# Patient Record
Sex: FEMALE | Race: WHITE
[De-identification: names, ages, dates, MRNs, and addresses within clinical notes are randomized per-mention and may not be internally consistent; named-entity substitution may affect disease eponyms.]

---

## 2020-07-22 PROBLEM — Z00.00 ENCOUNTER FOR PREVENTIVE HEALTH EXAMINATION: Status: ACTIVE | Noted: 2020-07-22

## 2020-07-29 ENCOUNTER — APPOINTMENT (OUTPATIENT)
Dept: OTOLARYNGOLOGY | Facility: CLINIC | Age: 75
End: 2020-07-29
Payer: MEDICARE

## 2020-07-29 VITALS
TEMPERATURE: 98.2 F | HEIGHT: 64 IN | BODY MASS INDEX: 17.93 KG/M2 | SYSTOLIC BLOOD PRESSURE: 127 MMHG | HEART RATE: 58 BPM | WEIGHT: 105 LBS | DIASTOLIC BLOOD PRESSURE: 69 MMHG

## 2020-07-29 DIAGNOSIS — H90.3 SENSORINEURAL HEARING LOSS, BILATERAL: ICD-10-CM

## 2020-07-29 PROCEDURE — 99204 OFFICE O/P NEW MOD 45 MIN: CPT | Mod: 25

## 2020-07-29 PROCEDURE — 92567 TYMPANOMETRY: CPT

## 2020-07-29 PROCEDURE — 92557 COMPREHENSIVE HEARING TEST: CPT

## 2020-07-29 RX ORDER — COLD-HOT PACK
EACH MISCELLANEOUS
Refills: 0 | Status: ACTIVE | COMMUNITY

## 2020-07-29 RX ORDER — ASPIRIN 81 MG
81 TABLET ORAL
Refills: 0 | Status: ACTIVE | COMMUNITY

## 2020-07-29 RX ORDER — CALCIUM CARBONATE/VITAMIN D3 600 MG-20
TABLET ORAL
Refills: 0 | Status: ACTIVE | COMMUNITY

## 2020-07-29 NOTE — CONSULT LETTER
[Dear  ___] : Dear  [unfilled], [Consult Letter:] : I had the pleasure of evaluating your patient, [unfilled]. [Please see my note below.] : Please see my note below. [Consult Closing:] : Thank you very much for allowing me to participate in the care of this patient.  If you have any questions, please do not hesitate to contact me. [Sincerely,] : Sincerely, [FreeTextEntry3] : Breann Moreno MD\par Otolaryngology and Cranial Base Surgery\par Attending Physician - Department of Otolaryngology and Head & Neck Surgery\par [facility]\par  - [employer] [facility]\par Office: [phone]\par Fax: [phone]\par

## 2020-07-29 NOTE — ASSESSMENT
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

## 2020-07-29 NOTE — DATA REVIEWED
[de-identified] : audio 7/29/20 with mild downsloping to severe/profound SNHL, type a tymps, good discrim

## 2020-07-29 NOTE — HISTORY OF PRESENT ILLNESS
[de-identified] : 73 y/o F, feels her hearing has been decreasing over the past few years.  No tinnitus, pain, d/c, or Vertigo.

## 2023-11-18 ENCOUNTER — INPATIENT (INPATIENT)
Facility: HOSPITAL | Age: 78
LOS: 3 days | Discharge: HOME HEALTH | DRG: 482 | End: 2023-11-22
Attending: GENERAL PRACTICE | Admitting: GENERAL PRACTICE
Payer: MEDICARE

## 2023-11-18 VITALS
DIASTOLIC BLOOD PRESSURE: 75 MMHG | OXYGEN SATURATION: 97 % | RESPIRATION RATE: 16 BRPM | HEART RATE: 88 BPM | WEIGHT: 104.94 LBS | SYSTOLIC BLOOD PRESSURE: 136 MMHG | HEIGHT: 62 IN

## 2023-11-18 DIAGNOSIS — Z29.9 ENCOUNTER FOR PROPHYLACTIC MEASURES, UNSPECIFIED: ICD-10-CM

## 2023-11-18 DIAGNOSIS — S72.002A FRACTURE OF UNSPECIFIED PART OF NECK OF LEFT FEMUR, INITIAL ENCOUNTER FOR CLOSED FRACTURE: ICD-10-CM

## 2023-11-18 DIAGNOSIS — Z86.79 PERSONAL HISTORY OF OTHER DISEASES OF THE CIRCULATORY SYSTEM: ICD-10-CM

## 2023-11-18 DIAGNOSIS — I10 ESSENTIAL (PRIMARY) HYPERTENSION: ICD-10-CM
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
## 2023-11-18 LAB
ALBUMIN SERPL ELPH-MCNC: 3.9 G/DL — SIGNIFICANT CHANGE UP (ref 3.5–5)
ALP SERPL-CCNC: 88 U/L — SIGNIFICANT CHANGE UP (ref 40–120)
ALT FLD-CCNC: 37 U/L DA — SIGNIFICANT CHANGE UP (ref 10–60)
ANION GAP SERPL CALC-SCNC: 4 MMOL/L — LOW (ref 5–17)
APTT BLD: 27.5 SEC — SIGNIFICANT CHANGE UP (ref 24.5–35.6)
AST SERPL-CCNC: 26 U/L — SIGNIFICANT CHANGE UP (ref 10–40)
BASOPHILS # BLD AUTO: 0.03 K/UL — SIGNIFICANT CHANGE UP (ref 0–0.2)
BASOPHILS NFR BLD AUTO: 0.4 % — SIGNIFICANT CHANGE UP (ref 0–2)
BILIRUB SERPL-MCNC: 0.4 MG/DL — SIGNIFICANT CHANGE UP (ref 0.2–1.2)
BLD GP AB SCN SERPL QL: SIGNIFICANT CHANGE UP
BUN SERPL-MCNC: 12 MG/DL — SIGNIFICANT CHANGE UP (ref 7–18)
CALCIUM SERPL-MCNC: 8.8 MG/DL — SIGNIFICANT CHANGE UP (ref 8.4–10.5)
CHLORIDE SERPL-SCNC: 105 MMOL/L — SIGNIFICANT CHANGE UP (ref 96–108)
CO2 SERPL-SCNC: 29 MMOL/L — SIGNIFICANT CHANGE UP (ref 22–31)
CREAT SERPL-MCNC: 0.78 MG/DL — SIGNIFICANT CHANGE UP (ref 0.5–1.3)
EGFR: 79 ML/MIN/1.73M2 — SIGNIFICANT CHANGE UP
EOSINOPHIL # BLD AUTO: 0.02 K/UL — SIGNIFICANT CHANGE UP (ref 0–0.5)
EOSINOPHIL NFR BLD AUTO: 0.3 % — SIGNIFICANT CHANGE UP (ref 0–6)
GLUCOSE SERPL-MCNC: 110 MG/DL — HIGH (ref 70–99)
HCT VFR BLD CALC: 41.1 % — SIGNIFICANT CHANGE UP (ref 34.5–45)
HGB BLD-MCNC: 13.8 G/DL — SIGNIFICANT CHANGE UP (ref 11.5–15.5)
IMM GRANULOCYTES NFR BLD AUTO: 0.3 % — SIGNIFICANT CHANGE UP (ref 0–0.9)
INR BLD: 0.96 RATIO — SIGNIFICANT CHANGE UP (ref 0.85–1.18)
LYMPHOCYTES # BLD AUTO: 1.17 K/UL — SIGNIFICANT CHANGE UP (ref 1–3.3)
LYMPHOCYTES # BLD AUTO: 16.4 % — SIGNIFICANT CHANGE UP (ref 13–44)
MCHC RBC-ENTMCNC: 32.5 PG — SIGNIFICANT CHANGE UP (ref 27–34)
MCHC RBC-ENTMCNC: 33.6 GM/DL — SIGNIFICANT CHANGE UP (ref 32–36)
MCV RBC AUTO: 96.9 FL — SIGNIFICANT CHANGE UP (ref 80–100)
MONOCYTES # BLD AUTO: 0.3 K/UL — SIGNIFICANT CHANGE UP (ref 0–0.9)
MONOCYTES NFR BLD AUTO: 4.2 % — SIGNIFICANT CHANGE UP (ref 2–14)
NEUTROPHILS # BLD AUTO: 5.61 K/UL — SIGNIFICANT CHANGE UP (ref 1.8–7.4)
NEUTROPHILS NFR BLD AUTO: 78.4 % — HIGH (ref 43–77)
NRBC # BLD: 0 /100 WBCS — SIGNIFICANT CHANGE UP (ref 0–0)
PLATELET # BLD AUTO: 218 K/UL — SIGNIFICANT CHANGE UP (ref 150–400)
POTASSIUM SERPL-MCNC: 4 MMOL/L — SIGNIFICANT CHANGE UP (ref 3.5–5.3)
POTASSIUM SERPL-SCNC: 4 MMOL/L — SIGNIFICANT CHANGE UP (ref 3.5–5.3)
PROT SERPL-MCNC: 6.5 G/DL — SIGNIFICANT CHANGE UP (ref 6–8.3)
PROTHROM AB SERPL-ACNC: 11 SEC — SIGNIFICANT CHANGE UP (ref 9.5–13)
RBC # BLD: 4.24 M/UL — SIGNIFICANT CHANGE UP (ref 3.8–5.2)
RBC # FLD: 12.8 % — SIGNIFICANT CHANGE UP (ref 10.3–14.5)
SODIUM SERPL-SCNC: 138 MMOL/L — SIGNIFICANT CHANGE UP (ref 135–145)
WBC # BLD: 7.15 K/UL — SIGNIFICANT CHANGE UP (ref 3.8–10.5)
WBC # FLD AUTO: 7.15 K/UL — SIGNIFICANT CHANGE UP (ref 3.8–10.5)

## 2023-11-18 PROCEDURE — 73552 X-RAY EXAM OF FEMUR 2/>: CPT | Mod: 26,LT

## 2023-11-18 PROCEDURE — 73502 X-RAY EXAM HIP UNI 2-3 VIEWS: CPT | Mod: 26,LT

## 2023-11-18 PROCEDURE — 99285 EMERGENCY DEPT VISIT HI MDM: CPT

## 2023-11-18 PROCEDURE — 71250 CT THORAX DX C-: CPT | Mod: 26,MA

## 2023-11-18 RX ORDER — IBUPROFEN 200 MG
600 TABLET ORAL ONCE
Refills: 0 | Status: DISCONTINUED | OUTPATIENT
Start: 2023-11-18 | End: 2023-11-18

## 2023-11-18 RX ORDER — OXYCODONE HYDROCHLORIDE 5 MG/1
5 TABLET ORAL EVERY 6 HOURS
Refills: 0 | Status: DISCONTINUED | OUTPATIENT
Start: 2023-11-18 | End: 2023-11-20

## 2023-11-18 RX ORDER — ACETAMINOPHEN 500 MG
1000 TABLET ORAL EVERY 6 HOURS
Refills: 0 | Status: DISCONTINUED | OUTPATIENT
Start: 2023-11-18 | End: 2023-11-18

## 2023-11-18 RX ORDER — HEPARIN SODIUM 5000 [USP'U]/ML
5000 INJECTION INTRAVENOUS; SUBCUTANEOUS EVERY 8 HOURS
Refills: 0 | Status: COMPLETED | OUTPATIENT
Start: 2023-11-18 | End: 2023-11-19

## 2023-11-18 RX ORDER — OXYCODONE HYDROCHLORIDE 5 MG/1
2.5 TABLET ORAL EVERY 6 HOURS
Refills: 0 | Status: DISCONTINUED | OUTPATIENT
Start: 2023-11-18 | End: 2023-11-20

## 2023-11-18 RX ORDER — LANOLIN ALCOHOL/MO/W.PET/CERES
3 CREAM (GRAM) TOPICAL AT BEDTIME
Refills: 0 | Status: DISCONTINUED | OUTPATIENT
Start: 2023-11-18 | End: 2023-11-22

## 2023-11-18 RX ORDER — ONDANSETRON 8 MG/1
4 TABLET, FILM COATED ORAL EVERY 8 HOURS
Refills: 0 | Status: DISCONTINUED | OUTPATIENT
Start: 2023-11-18 | End: 2023-11-22

## 2023-11-18 RX ORDER — KETOROLAC TROMETHAMINE 30 MG/ML
15 SYRINGE (ML) INJECTION EVERY 6 HOURS
Refills: 0 | Status: DISCONTINUED | OUTPATIENT
Start: 2023-11-18 | End: 2023-11-18

## 2023-11-18 RX ORDER — ACETAMINOPHEN 500 MG
650 TABLET ORAL EVERY 6 HOURS
Refills: 0 | Status: DISCONTINUED | OUTPATIENT
Start: 2023-11-18 | End: 2023-11-20

## 2023-11-18 RX ADMIN — OXYCODONE HYDROCHLORIDE 2.5 MILLIGRAM(S): 5 TABLET ORAL at 20:22

## 2023-11-18 RX ADMIN — Medication 1000 MILLIGRAM(S): at 12:24

## 2023-11-18 RX ADMIN — Medication 650 MILLIGRAM(S): at 17:39

## 2023-11-18 RX ADMIN — Medication 15 MILLIGRAM(S): at 12:25

## 2023-11-18 RX ADMIN — OXYCODONE HYDROCHLORIDE 2.5 MILLIGRAM(S): 5 TABLET ORAL at 20:02

## 2023-11-18 NOTE — H&P ADULT - PROBLEM SELECTOR PLAN 1
P/w left hip and left chest pain. S/p fall.   Xray pelvis - left femoral neck fracture.   CT chest - No evidence of acute thoracic traumatic injury on this noncontrast CT chest.  Tylenol - mild pain.   Oxycodone 2.5 - moderate pain  Oxycodone 5.0 - severe pain.   Ortho consulted -  Left hip hemiarthroplasty on Monday, 11/20/2023.   PT consult after surgery.

## 2023-11-18 NOTE — ED ADULT NURSE NOTE - NSFALLRISKINTERV_ED_ALL_ED
Assistance OOB with selected safe patient handling equipment if applicable/Assistance with ambulation/Communicate fall risk and risk factors to all staff, patient, and family/Monitor gait and stability/Provide visual cue: yellow wristband, yellow gown, etc/Reinforce activity limits and safety measures with patient and family/Call bell, personal items and telephone in reach/Instruct patient to call for assistance before getting out of bed/chair/stretcher/Non-slip footwear applied when patient is off stretcher/[place] to call system/Physically safe environment - no spills, clutter or unnecessary equipment/Purposeful Proactive Rounding/Room/bathroom lighting operational, light cord in reach Assistance OOB with selected safe patient handling equipment if applicable/Assistance with ambulation/Communicate fall risk and risk factors to all staff, patient, and family/Monitor gait and stability/Provide visual cue: yellow wristband, yellow gown, etc/Reinforce activity limits and safety measures with patient and family/Call bell, personal items and telephone in reach/Instruct patient to call for assistance before getting out of bed/chair/stretcher/Non-slip footwear applied when patient is off stretcher/[place] to call system/Physically safe environment - no spills, clutter or unnecessary equipment/Purposeful Proactive Rounding/Room/bathroom lighting operational, light cord in reach Assistance OOB with selected safe patient handling equipment if applicable/Assistance with ambulation/Communicate fall risk and risk factors to all staff, patient, and family/Monitor gait and stability/Provide visual cue: yellow wristband, yellow gown, etc/Reinforce activity limits and safety measures with patient and family/Call bell, personal items and telephone in reach/Instruct patient to call for assistance before getting out of bed/chair/stretcher/Non-slip footwear applied when patient is off stretcher/[place] to call system/Physically safe environment - no spills, clutter or unnecessary equipment/Purposeful Proactive Rounding/Room/bathroom lighting operational, light cord in reach

## 2023-11-18 NOTE — ED PROVIDER NOTE - OBJECTIVE STATEMENT
78-year-old woman presenting complaining of left hip pain and left chest wall pain after mechanical fall.  She reports that she was out walking this morning when she tripped and fell on her left side.  She denies any preceding chest pain shortness of breath or proceeding presyncopal or near syncopal symptoms.  Bystanders attempted to help her stand but she was not able to bear weight on her left leg.

## 2023-11-18 NOTE — ED PROVIDER NOTE - PHYSICAL EXAMINATION
Exam:  General: Patient well appearing, vital signs within normal limits  HEENT: airway patent with moist mucous membranes  Cardiac: RRR S1/S2 with strong peripheral pulses  Respiratory: lungs clear without respiratory distress, L approximate rib 8 point tender in midaxillary line  GI: abdomen soft, non tender, non distended  Neuro: no gross neurologic deficits  MSK: pelvis stable, no deformity/shortening, ROM L hip limited  Skin: warm, well perfused  Psych: normal mood and affect

## 2023-11-18 NOTE — H&P ADULT - NSHPPHYSICALEXAM_GEN_ALL_CORE
PHYSICAL EXAM:  GENERAL: NAD, speaks in full sentences, no signs of respiratory distress  HEAD:  Atraumatic, Normocephalic  EYES: EOMI, PERRLA, conjunctiva and sclera clear  NECK: Supple  CHEST/LUNG: Clear to auscultation bilaterally; No wheeze; No crackles; No accessory muscles used  HEART: Regular rate and rhythm; No murmurs;   ABDOMEN: Soft, Nontender, Nondistended; Bowel sounds present; No guarding  EXTREMITIES:  2+ Peripheral Pulses, No edema, left hip tender to touch, restricted ROM in left hip due to pain.   PSYCH: AAOx3  NEUROLOGY: non-focal  SKIN: No rashes or lesions vitals reviewed in EMR    PHYSICAL EXAM:  GENERAL: NAD, speaks in full sentences, no signs of respiratory distress  HEAD:  Atraumatic, Normocephalic  EYES: EOMI, PERRLA, conjunctiva and sclera clear  NECK: Supple  CHEST/LUNG: Clear to auscultation bilaterally; No wheeze; No crackles; No accessory muscles used  HEART: Regular rate and rhythm; No murmurs;   ABDOMEN: Soft, Nontender, Nondistended; Bowel sounds present; No guarding  EXTREMITIES:  2+ Peripheral Pulses, No edema, left hip tender to touch, restricted ROM in left hip due to pain.   PSYCH: AAOx3  NEUROLOGY: non-focal  SKIN: No rashes or lesions

## 2023-11-18 NOTE — CONSULT NOTE ADULT - SUBJECTIVE AND OBJECTIVE BOX
Orthopedic Consult  CAROL ROY [id number]  78yFemale      Diagnosis:  78yFemale with Left Hip Femoral Neck Fracture    HPI:  78yFemale, from home, ambulates at baseline with , with a PMHx of  , [facility] Right hip pain s/p mechanical fall. Patient states the pain is localized to right hip, non-radiating, exacerbated with movement of the right leg. Patient denies any head trauma, LOC or syncope. Patient denies Chest pain, SOB, dyspnea, N/V/D.      79 y/o F, from home, ambulates independently at baseline who presents today with complaints of left hip pain s/p mechanical fall this morning. Patient states she was out running this morning and during her walk after the run she tripped and fell onto her left hip, denies any head trauma, LOC or syncope. Bystanders attempted to help patient up however they were unable to and EMS was called. Patient states that the pain is located over the left hip, non-radiating, exacerbated with movement of the left leg. Pt denies Chest pain, SOB, dyspnea, paresthesias, N/V/D, abdominal pain, syncope, or pain anywhere else.     PAST MEDICAL & SURGICAL HISTORY:      Social History:      Allergies    No Known Allergies    Intolerances        Vital Signs Last 24 Hrs  T(C): 36.9 (18 Nov 2023 12:17), Max: 36.9 (18 Nov 2023 12:17)  T(F): 98.4 (18 Nov 2023 12:17), Max: 98.4 (18 Nov 2023 12:17)  HR: 68 (18 Nov 2023 12:17) (68 - 88)  BP: 136/71 (18 Nov 2023 12:17) (136/71 - 136/75)  BP(mean): --  RR: 16 (18 Nov 2023 12:17) (16 - 16)  SpO2: 96% (18 Nov 2023 12:17) (96% - 97%)    Parameters below as of 18 Nov 2023 12:17  Patient On (Oxygen Delivery Method): room air      I&O's Detail      Physical Exam:  General: AAOx3, NAD, resting in bed    Left Hip:  Left leg shortened and externally rotated. Skin is pink and warm. Tender at the fracture site. Decreased ROM of the affected hip due to pain. SILT. NVI. Positive logroll test. Positive heel strike.  Lower extremity:  No calf tenderness, calves are soft. 2+pulses. NVI. 5/5 Strength of FHL/EHL/ADF/APF b/l.  Good capillary refill. Warm, well-perfused.                           13.8   7.15  )-----------( 218      ( 18 Nov 2023 11:10 )             41.1     11-18    138  |  105  |  12  ----------------------------<  110<H>  4.0   |  29  |  0.78    Ca    8.8      18 Nov 2023 11:10    TPro  6.5  /  Alb  3.9  /  TBili  0.4  /  DBili  x   /  AST  26  /  ALT  37  /  AlkPhos  88  11-18    PT/INR - ( 18 Nov 2023 11:10 )   PT: 11.0 sec;   INR: 0.96 ratio         PTT - ( 18 Nov 2023 11:10 )  PTT:27.5 sec  Urinalysis Basic - ( 18 Nov 2023 11:10 )    Color: x / Appearance: x / SG: x / pH: x  Gluc: 110 mg/dL / Ketone: x  / Bili: x / Urobili: x   Blood: x / Protein: x / Nitrite: x   Leuk Esterase: x / RBC: x / WBC x   Sq Epi: x / Non Sq Epi: x / Bacteria: x        Imaging:      Impression:  78yFemale with Left Hip Femoral Neck Fracture    Plan:  -  Pain management  -  DVT prophylaxis with heparin and Venodynes   > Hold all anticoagulation  monday for surgery   -  Keep NPO after midnight tomorrow for surgery monday  -  Fracture care with bedrest now, NWB of the affected extremity  -  Surgeon:  Dr. Espinoza  -  Procedure:  Left hip hemiarthroplasty  -  Medical Optimization/clearance  -  Chlorhexadine wipe and Povidone Iodine nasal swabs ordered   -  Consent: Pending  -  Case d/w Dr. Espinoza   Orthopedic Consult  CAROL ROY [id number]  78yFemale      Diagnosis:  78yFemale with Left Hip Femoral Neck Fracture    HPI:  78yFemale, from home, ambulates at baseline with , with a PMHx of  , who presents to [facility] ED with Left Right hip pain s/p mechanical fall. Patient states the pain is localized to right hip, non-radiating, exacerbated with movement of the right leg. Patient denies any head trauma, LOC or syncope. Patient denies Chest pain, SOB, dyspnea, N/V/D.      79 y/o F, from home, ambulates independently at baseline who presents today with complaints of left hip pain s/p mechanical fall this morning. Patient states she was out running this morning and during her walk after the run she tripped and fell onto her left hip, denies any head trauma, LOC or syncope. Bystanders attempted to help patient up however they were unable to and EMS was called. Patient states that the pain is located over the left hip, non-radiating, exacerbated with movement of the left leg. Pt denies Chest pain, SOB, dyspnea, paresthesias, N/V/D, abdominal pain, syncope, or pain anywhere else.     PAST MEDICAL & SURGICAL HISTORY:      Social History:      Allergies    No Known Allergies    Intolerances        Vital Signs Last 24 Hrs  T(C): 36.9 (18 Nov 2023 12:17), Max: 36.9 (18 Nov 2023 12:17)  T(F): 98.4 (18 Nov 2023 12:17), Max: 98.4 (18 Nov 2023 12:17)  HR: 68 (18 Nov 2023 12:17) (68 - 88)  BP: 136/71 (18 Nov 2023 12:17) (136/71 - 136/75)  BP(mean): --  RR: 16 (18 Nov 2023 12:17) (16 - 16)  SpO2: 96% (18 Nov 2023 12:17) (96% - 97%)    Parameters below as of 18 Nov 2023 12:17  Patient On (Oxygen Delivery Method): room air      I&O's Detail      Physical Exam:  General: AAOx3, NAD, resting in bed    Left Hip:  Left leg shortened and externally rotated. Skin is pink and warm. Tender at the fracture site. Decreased ROM of the affected hip due to pain. SILT. NVI. Positive logroll test. Positive heel strike.  Lower extremity:  No calf tenderness, calves are soft. 2+pulses. NVI. 5/5 Strength of FHL/EHL/ADF/APF b/l.  Good capillary refill. Warm, well-perfused.                           13.8   7.15  )-----------( 218      ( 18 Nov 2023 11:10 )             41.1     11-18    138  |  105  |  12  ----------------------------<  110<H>  4.0   |  29  |  0.78    Ca    8.8      18 Nov 2023 11:10    TPro  6.5  /  Alb  3.9  /  TBili  0.4  /  DBili  x   /  AST  26  /  ALT  37  /  AlkPhos  88  11-18    PT/INR - ( 18 Nov 2023 11:10 )   PT: 11.0 sec;   INR: 0.96 ratio         PTT - ( 18 Nov 2023 11:10 )  PTT:27.5 sec  Urinalysis Basic - ( 18 Nov 2023 11:10 )    Color: x / Appearance: x / SG: x / pH: x  Gluc: 110 mg/dL / Ketone: x  / Bili: x / Urobili: x   Blood: x / Protein: x / Nitrite: x   Leuk Esterase: x / RBC: x / WBC x   Sq Epi: x / Non Sq Epi: x / Bacteria: x        Imaging:      Impression:  78yFemale with Left Hip Femoral Neck Fracture    Plan:  -  Pain management  -  DVT prophylaxis with heparin and Venodynes   > Hold all anticoagulation  monday for surgery   -  Keep NPO after midnight tomorrow for surgery monday  -  Fracture care with bedrest now, NWB of the affected extremity  -  Surgeon:  Dr. Espinoza  -  Procedure:  Left hip hemiarthroplasty  -  Medical Optimization/clearance  -  Chlorhexadine wipe and Povidone Iodine nasal swabs ordered   -  Consent: Pending  -  Case d/w Dr. Espinoza   Orthopedic Consult  CAROL ROY [id number]  78yFemale      Diagnosis:  78yFemale with Left Hip Femoral Neck Fracture    HPI:  78yFemale, from home, ambulates at baseline with , with a PMHx of  , who presents to [facility] ED with Left Right hip pain s/p mechanical fall. Patient states the pain is localized to right hip, non-radiating, exacerbated with movement of the right leg. Patient denies any head trauma, LOC or syncope. Patient denies Chest pain, SOB, dyspnea, N/V/D.      77 y/o F, from home, ambulates independently at baseline who presents today with complaints of left hip pain s/p mechanical fall this morning. Patient states she was out running this morning and during her walk after the run she tripped and fell onto her left hip, denies any head trauma, LOC or syncope. Bystanders attempted to help patient up however they were unable to and EMS was called. Patient states that the pain is located over the left hip, non-radiating, exacerbated with movement of the left leg. Pt denies Chest pain, SOB, dyspnea, paresthesias, N/V/D, abdominal pain, syncope, or pain anywhere else.     PAST MEDICAL & SURGICAL HISTORY:      Social History:      Allergies    No Known Allergies    Intolerances        Vital Signs Last 24 Hrs  T(C): 36.9 (18 Nov 2023 12:17), Max: 36.9 (18 Nov 2023 12:17)  T(F): 98.4 (18 Nov 2023 12:17), Max: 98.4 (18 Nov 2023 12:17)  HR: 68 (18 Nov 2023 12:17) (68 - 88)  BP: 136/71 (18 Nov 2023 12:17) (136/71 - 136/75)  BP(mean): --  RR: 16 (18 Nov 2023 12:17) (16 - 16)  SpO2: 96% (18 Nov 2023 12:17) (96% - 97%)    Parameters below as of 18 Nov 2023 12:17  Patient On (Oxygen Delivery Method): room air      I&O's Detail      Physical Exam:  General: AAOx3, NAD, resting in bed    Left Hip:  Left leg shortened and externally rotated. Skin is pink and warm. Tender at the fracture site. Decreased ROM of the affected hip due to pain. SILT. NVI. Positive logroll test. Positive heel strike.  Lower extremity:  No calf tenderness, calves are soft. 2+pulses. NVI. 5/5 Strength of FHL/EHL/ADF/APF b/l.  Good capillary refill. Warm, well-perfused.                           13.8   7.15  )-----------( 218      ( 18 Nov 2023 11:10 )             41.1     11-18    138  |  105  |  12  ----------------------------<  110<H>  4.0   |  29  |  0.78    Ca    8.8      18 Nov 2023 11:10    TPro  6.5  /  Alb  3.9  /  TBili  0.4  /  DBili  x   /  AST  26  /  ALT  37  /  AlkPhos  88  11-18    PT/INR - ( 18 Nov 2023 11:10 )   PT: 11.0 sec;   INR: 0.96 ratio         PTT - ( 18 Nov 2023 11:10 )  PTT:27.5 sec  Urinalysis Basic - ( 18 Nov 2023 11:10 )    Color: x / Appearance: x / SG: x / pH: x  Gluc: 110 mg/dL / Ketone: x  / Bili: x / Urobili: x   Blood: x / Protein: x / Nitrite: x   Leuk Esterase: x / RBC: x / WBC x   Sq Epi: x / Non Sq Epi: x / Bacteria: x        Imaging:      Impression:  78yFemale with Left Hip Femoral Neck Fracture    Plan:  -  Pain management  -  DVT prophylaxis with heparin and Venodynes   > Hold all anticoagulation  monday for surgery   -  Keep NPO after midnight tomorrow for surgery monday  -  Fracture care with bedrest now, NWB of the affected extremity  -  Surgeon:  Dr. Espinoza  -  Procedure:  Left hip hemiarthroplasty  -  Medical Optimization/clearance  -  Chlorhexadine wipe and Povidone Iodine nasal swabs ordered   -  Consent: Pending  -  Case d/w Dr. Espinoza

## 2023-11-18 NOTE — H&P ADULT - PROBLEM SELECTOR PLAN 2
P/w left hip and left chest pain. S/p fall.   Xray pelvis - left femoral neck fracture.  Tylenol - mild pain.   Oxycodone 2.5 - moderate pain  Oxycodone 5.0 - severe pain.   Ortho consulted -  Left hip hemiarthroplasty on Monday, 11/20/2023.   PT consult after surgery.  Good functional capacity (METS 4), no exertional chest pain or shortness of breath  Ewing - 0.0% risk of MI or cardiac arrest, intraoperatively or up to 30 days post up.    RCRI - 3.9% 30 day risk of death, MI, or cardiac arrest. P/w left hip and left chest pain. S/p fall.   Xray pelvis - left femoral neck fracture.  Tylenol - mild pain.   Oxycodone 2.5 - moderate pain  Oxycodone 5.0 - severe pain.   Ortho consulted -  Left hip hemiarthroplasty on Monday, 11/20/2023.   PT consult after surgery.  Good functional capacity (METS 4), no exertional chest pain or shortness of breath.   F/U echo.   Ewing - 0.0% risk of MI or cardiac arrest, intraoperatively or up to 30 days post up.    RCRI - 3.9% 30 day risk of death, MI, or cardiac arrest.

## 2023-11-18 NOTE — H&P ADULT - HISTORY OF PRESENT ILLNESS
Patient is a 77 y/o F with who ambulates independently with no known PMH and PSH of appendectomy presented after a fall. Patient reports that after her usual run in the park she was walking at a slower pace to cool down when she tripped and fell on her left side. Patient reports she did not have any lightheadedness, or dizziness before the fall. Patient also denies hitting her head or losing consciousness. Patient reports that after the fall she had sharp pain in her left hip and left chest. Patient reports after the fall some passerby tried to help him get up but she was not able to stand back up. Patient denies any recent fevers, chills, weakness, fatigue, malaise, headache, dizziness, lightheadedness, chest pain, palpitations, shortness of breath, cough, nausea, vomiting, abdominal pain, diarrhea, constipation, melena, hematochezia, dysuria, urinary frequency, or urgency. Patient denies any other complains at this time. Patient reports taking multiple vitamins/supplements like vitamin D, calcium, magnesium, chondroitin sulphate, and glucosamine. Patient denies history of osteoporosis.     Patient is a 79 y/o F with who ambulates independently with no known PMH and PSH of appendectomy presented after a fall. Patient reports that after her usual run in the park she was walking at a slower pace to cool down when she tripped and fell on her left side. Patient reports she did not have any lightheadedness, or dizziness before the fall. Patient also denies hitting her head or losing consciousness. Patient reports that after the fall she had sharp pain in her left hip and left chest. Patient reports after the fall some passerby tried to help him get up but she was not able to stand back up. Patient denies any recent fevers, chills, weakness, fatigue, malaise, headache, dizziness, lightheadedness, chest pain, palpitations, shortness of breath, cough, nausea, vomiting, abdominal pain, diarrhea, constipation, melena, hematochezia, dysuria, urinary frequency, or urgency. Patient denies any other complains at this time. Patient reports taking multiple vitamins/supplements like vitamin D, calcium, magnesium, chondroitin sulphate, and glucosamine. Patient denies history of osteoporosis.     Patient is a 79 y/o F with who ambulates independently with  PMH of HTN and carotid stenosis and PSH of appendectomy presented after a fall. Patient reports that after her usual run in the park she was walking at a slower pace to cool down when she tripped and fell on her left side. Patient reports she did not have any lightheadedness, or dizziness before the fall. Patient also denies hitting her head or losing consciousness. Patient reports that after the fall she had sharp pain in her left hip and left chest. Patient reports after the fall some passerby tried to help him get up but she was not able to stand back up. Patient denies any recent fevers, chills, weakness, fatigue, malaise, headache, dizziness, lightheadedness, chest pain, palpitations, shortness of breath, cough, nausea, vomiting, abdominal pain, diarrhea, constipation, melena, hematochezia, dysuria, urinary frequency, or urgency. Patient denies any other complains at this time. Patient reports taking multiple vitamins/supplements like vitamin D, calcium, magnesium, chondroitin sulphate, and glucosamine. Patient denies history of osteoporosis.     Patient is a 77 y/o F with who ambulates independently with  PMH of HTN and carotid stenosis and PSH of appendectomy presented after a fall. Patient reports that after her usual run in the park she was walking at a slower pace to cool down when she tripped and fell on her left side. Patient reports she did not have any lightheadedness, or dizziness before the fall. Patient also denies hitting her head or losing consciousness. Patient reports that after the fall she had sharp pain in her left hip and left chest. Patient reports after the fall some passerby tried to help him get up but she was not able to stand back up. Patient denies any recent fevers, chills, weakness, fatigue, malaise, headache, dizziness, lightheadedness, chest pain, palpitations, shortness of breath, cough, nausea, vomiting, abdominal pain, diarrhea, constipation, melena, hematochezia, dysuria, urinary frequency, or urgency. Patient denies any other complains at this time. Patient reports taking multiple vitamins/supplements like vitamin D, calcium, magnesium, chondroitin sulphate, and glucosamine. Patient denies history of osteoporosis.     Patient is a 79 y/o F with who ambulates independently with  PMH of HTN and carotid stenosis and PSH of appendectomy presented after a fall.   Patient reports that after her usual run in the park she was walking at a slower pace to cool down when she tripped and fell on her left side. Patient reports she did not have any lightheadedness, or dizziness before the fall. Patient also denies hitting her head or losing consciousness. Patient reports that after the fall she had sharp pain in her left hip and left chest. Patient reports after the fall some passerby tried to help him get up but she was not able to stand back up.   Patient denies any recent fevers, chills, weakness, fatigue, malaise, headache, dizziness, lightheadedness, chest pain, palpitations, shortness of breath, cough, nausea, vomiting, abdominal pain, diarrhea, constipation, melena, hematochezia, dysuria, urinary frequency, or urgency. Patient denies any other complains at this time.   Patient reports taking multiple vitamins/supplements like vitamin D, calcium, magnesium, chondroitin sulphate, and glucosamine. Patient denies history of osteoporosis.     [attending addendum: discussed with pt's PMD / cardiologist, Dr Burton, patient with history of a carotid plaque Aortic insufficiency ( moderate) and possible CVA/TIA ( in [place])) but has not had any recent visit or cardiovascular workup.. >> Echo and carotic ordered)]      Patient is a 77 y/o F with who ambulates independently with  PMH of HTN and carotid stenosis and PSH of appendectomy presented after a fall.   Patient reports that after her usual run in the park she was walking at a slower pace to cool down when she tripped and fell on her left side. Patient reports she did not have any lightheadedness, or dizziness before the fall. Patient also denies hitting her head or losing consciousness. Patient reports that after the fall she had sharp pain in her left hip and left chest. Patient reports after the fall some passerby tried to help him get up but she was not able to stand back up.   Patient denies any recent fevers, chills, weakness, fatigue, malaise, headache, dizziness, lightheadedness, chest pain, palpitations, shortness of breath, cough, nausea, vomiting, abdominal pain, diarrhea, constipation, melena, hematochezia, dysuria, urinary frequency, or urgency. Patient denies any other complains at this time.   Patient reports taking multiple vitamins/supplements like vitamin D, calcium, magnesium, chondroitin sulphate, and glucosamine. Patient denies history of osteoporosis.     [attending addendum: discussed with pt's PMD / cardiologist, Dr Burton, patient with history of a carotid plaque Aortic insufficiency ( moderate) and possible CVA/TIA ( in [place])) but has not had any recent visit or cardiovascular workup.. >> Echo and carotic ordered)]      Patient is a 79 y/o F with who ambulates independently with  PMH of HTN and carotid stenosis and PSH of appendectomy presented after a fall.   Patient reports that after her usual run in the park she was walking at a slower pace to cool down when she tripped and fell on her left side. Patient reports she did not have any lightheadedness, or dizziness before the fall. Patient also denies hitting her head or losing consciousness. Patient reports that after the fall she had sharp pain in her left hip and left chest. Patient reports after the fall some passerby tried to help him get up but she was not able to stand back up.   Patient denies any recent fevers, chills, weakness, fatigue, malaise, headache, dizziness, lightheadedness, chest pain, palpitations, shortness of breath, cough, nausea, vomiting, abdominal pain, diarrhea, constipation, melena, hematochezia, dysuria, urinary frequency, or urgency. Patient denies any other complains at this time.   Patient reports taking multiple vitamins/supplements like vitamin D, calcium, magnesium, chondroitin sulphate, and glucosamine. Patient denies history of osteoporosis.     [attending addendum: discussed with pt's PMD / cardiologist, Dr Burton, patient with history of a carotid plaque Aortic insufficiency ( moderate) and possible CVA/TIA ( in [place])) but has not had any recent visit or cardiovascular workup.. >> Echo and carotic ordered)]

## 2023-11-18 NOTE — H&P ADULT - ASSESSMENT
Patient is a 77 y/o F with who ambulates independently with no known PMH and PSH of appendectomy presented after a fall. Patient reports that after her usual run in the park she was walking at a slower pace to cool down when she tripped and fell on her left side. Patient is being admitted for further management of left femoral neck fracture.  Patient is a 79 y/o F with who ambulates independently with  PMH of HTN and carotid stenosis and PSH of appendectomy presented after a fall. Patient reports that after her usual run in the park she was walking at a slower pace to cool down when she tripped and fell on her left side. Patient is being admitted for further management of left femoral neck fracture.  Patient is a 77 y/o F with who ambulates independently with  PMH of HTN and carotid stenosis and PSH of appendectomy presented after a fall. Patient reports that after her usual run in the park she was walking at a slower pace to cool down when she tripped and fell on her left side. Patient is being admitted for further management of left femoral neck fracture.  Patient is a 79 y/o F with who ambulates independently with  PMH of HTN and carotid stenosis and PSH of appendectomy presented after a fall. Patient reports that after her usual run in the park she was walking at a slower pace to cool down when she tripped and fell on her left side. Patient is being admitted for further management of left femoral neck fracture.    Patient is a 77 y/o F with who ambulates independently with  PMH of HTN and carotid stenosis and PSH of appendectomy presented after a fall. Patient reports that after her usual run in the park she was walking at a slower pace to cool down when she tripped and fell on her left side. Patient is being admitted for further management of left femoral neck fracture.

## 2023-11-18 NOTE — ED PROVIDER NOTE - CLINICAL SUMMARY MEDICAL DECISION MAKING FREE TEXT BOX
Patient presenting complaining of left hip pain and left chest wall pain after mechanical fall.  Will treat pain (only accepting ibuprofen at this time), plain films of hip, CT chest, re-evaluate

## 2023-11-18 NOTE — H&P ADULT - NSHPADDITIONALINFOADULT_GEN_ALL_CORE
Patient evaluated, case discussed with me, chart reviewed, agree with above H/P as reviewed.  Dr. FRANCESCO Padron ([phone])  [attending addendum: discussed with pt's PMD / cardiologist, Dr Burton, patient with history of a carotid plaque Aortic insufficiency ( moderate) and possible CVA/TIA ( in [place])) but has not had any recent visit or cardiovascular workup.. >> Echo and carotic ordered)] Patient evaluated, case discussed with me, chart reviewed, agree with above H/P as reviewed.  Dr. FRANCESCO Padron ([phone])  [attending addendum: discussed with pt's PMD / cardiologist, Dr Burton, patient with history of a carotid plaque Aortic insufficiency ( moderate) and possible CVA/TIA ( in [place])) but has not had any recent visit or cardiovascular workup.. >> Echo and carotic ordered)] Patient evaluated, case discussed with me, chart reviewed, agree with above H/P as reviewed.  Dr. FRANCESCO Padron ([phone])  [attending addendum: discussed with pt's PMD / cardiologist, Dr Burton, patient with history of a carotid plaque Aortic insufficiency ( moderate) and possible CVA/TIA ( in [place])) but has not had any recent visit or cardiovascular workup.. >> Echo and carotic ordered)]

## 2023-11-18 NOTE — CONSULT NOTE ADULT - NS ATTEND AMEND GEN_ALL_CORE FT
Note reviewed.  Left fem neck fx.  Need to get ready for surgery on Monday 11/20/2023  Called BRENNA ZAPATA and discussed the case. Note reviewed.  Left fem neck fx.  Need to get ready for surgery on Monday 11/20/2023  Planned surgery: Hemiarthroplasty  Called BRENNA ZAPATA and discussed the case.

## 2023-11-18 NOTE — PATIENT PROFILE ADULT - FALL HARM RISK - HARM RISK INTERVENTIONS
Communicate Risk of Fall with Harm to all staff/Reinforce activity limits and safety measures with patient and family/Tailored Fall Risk Interventions/Visual Cue: Yellow wristband and red socks/Bed in lowest position, wheels locked, appropriate side rails in place/Call bell, personal items and telephone in reach/Instruct patient to call for assistance before getting out of bed or chair/Non-slip footwear when patient is out of bed/[place] to call system/Physically safe environment - no spills, clutter or unnecessary equipment/Purposeful Proactive Rounding/Room/bathroom lighting operational, light cord in reach Communicate Risk of Fall with Harm to all staff/Reinforce activity limits and safety measures with patient and family/Tailored Fall Risk Interventions/Visual Cue: Yellow wristband and red socks/Bed in lowest position, wheels locked, appropriate side rails in place/Call bell, personal items and telephone in reach/Instruct patient to call for assistance before getting out of bed or chair/Non-slip footwear when patient is out of bed/[place] to call system/Physically safe environment - no spills, clutter or unnecessary equipment/Purposeful Proactive Rounding/Room/bathroom lighting operational, light cord in reach Communicate Risk of Fall with Harm to all staff/Reinforce activity limits and safety measures with patient and family/Tailored Fall Risk Interventions/Visual Cue: Yellow wristband and red socks/Bed in lowest position, wheels locked, appropriate side rails in place/Call bell, personal items and telephone in reach/Instruct patient to call for assistance before getting out of bed or chair/Non-slip footwear when patient is out of bed/[place] to call system/Physically safe environment - no spills, clutter or unnecessary equipment/Purposeful Proactive Rounding/Room/bathroom lighting operational, light cord in reach Assistance with ambulation/Assistance OOB with selected safe patient handling equipment/Communicate Risk of Fall with Harm to all staff/Reinforce activity limits and safety measures with patient and family/Tailored Fall Risk Interventions/Visual Cue: Yellow wristband and red socks/Bed in lowest position, wheels locked, appropriate side rails in place/Call bell, personal items and telephone in reach/Instruct patient to call for assistance before getting out of bed or chair/Non-slip footwear when patient is out of bed/[place] to call system/Physically safe environment - no spills, clutter or unnecessary equipment/Purposeful Proactive Rounding/Room/bathroom lighting operational, light cord in reach Assistance with ambulation/Assistance OOB with selected safe patient handling equipment/Communicate Risk of Fall with Harm to all staff/Reinforce activity limits and safety measures with patient and family/Tailored Fall Risk Interventions/Visual Cue: Yellow wristband and red socks/Bed in lowest position, wheels locked, appropriate side rails in place/Call bell, personal items and telephone in reach/Instruct patient to call for assistance before getting out of bed or chair/Non-slip footwear when patient is out of bed/[place] to call system/Physically safe environment - no spills, clutter or unnecessary equipment/Purposeful Proactive Rounding/Room/bathroom lighting operational, light cord in reach Assistance with ambulation/Assistance OOB with selected safe patient handling equipment/Communicate Risk of Fall with Harm to all staff/Reinforce activity limits and safety measures with patient and family/Tailored Fall Risk Interventions/Visual Cue: Yellow wristband and red socks/Bed in lowest position, wheels locked, appropriate side rails in place/Call bell, personal items and telephone in reach/Instruct patient to call for assistance before getting out of bed or chair/Non-slip footwear when patient is out of bed/[place] to call system/Physically safe environment - no spills, clutter or unnecessary equipment/Purposeful Proactive Rounding/Room/bathroom lighting operational, light cord in reach

## 2023-11-18 NOTE — ED PROVIDER NOTE - PROGRESS NOTE DETAILS
I independently interpreted the patients XR and note a femoral neck fracture, will consult ortho, preop labs ordered

## 2023-11-19 ENCOUNTER — TRANSCRIPTION ENCOUNTER (OUTPATIENT)
Age: 78
End: 2023-11-19

## 2023-11-19 LAB
ABO RH CONFIRMATION: SIGNIFICANT CHANGE UP
ANION GAP SERPL CALC-SCNC: 1 MMOL/L — LOW (ref 5–17)
BUN SERPL-MCNC: 18 MG/DL — SIGNIFICANT CHANGE UP (ref 7–18)
CALCIUM SERPL-MCNC: 8.6 MG/DL — SIGNIFICANT CHANGE UP (ref 8.4–10.5)
CHLORIDE SERPL-SCNC: 106 MMOL/L — SIGNIFICANT CHANGE UP (ref 96–108)
CO2 SERPL-SCNC: 32 MMOL/L — HIGH (ref 22–31)
CREAT SERPL-MCNC: 0.68 MG/DL — SIGNIFICANT CHANGE UP (ref 0.5–1.3)
EGFR: 90 ML/MIN/1.73M2 — SIGNIFICANT CHANGE UP
GLUCOSE SERPL-MCNC: 105 MG/DL — HIGH (ref 70–99)
HCT VFR BLD CALC: 40.4 % — SIGNIFICANT CHANGE UP (ref 34.5–45)
HCV AB S/CO SERPL IA: 0.04 S/CO — SIGNIFICANT CHANGE UP (ref 0–0.99)
HCV AB SERPL-IMP: SIGNIFICANT CHANGE UP
HGB BLD-MCNC: 13.5 G/DL — SIGNIFICANT CHANGE UP (ref 11.5–15.5)
MAGNESIUM SERPL-MCNC: 2.1 MG/DL — SIGNIFICANT CHANGE UP (ref 1.6–2.6)
MCHC RBC-ENTMCNC: 32.8 PG — SIGNIFICANT CHANGE UP (ref 27–34)
MCHC RBC-ENTMCNC: 33.4 GM/DL — SIGNIFICANT CHANGE UP (ref 32–36)
MCV RBC AUTO: 98.3 FL — SIGNIFICANT CHANGE UP (ref 80–100)
NRBC # BLD: 0 /100 WBCS — SIGNIFICANT CHANGE UP (ref 0–0)
PHOSPHATE SERPL-MCNC: 3.2 MG/DL — SIGNIFICANT CHANGE UP (ref 2.5–4.5)
PLATELET # BLD AUTO: 205 K/UL — SIGNIFICANT CHANGE UP (ref 150–400)
POTASSIUM SERPL-MCNC: 3.7 MMOL/L — SIGNIFICANT CHANGE UP (ref 3.5–5.3)
POTASSIUM SERPL-SCNC: 3.7 MMOL/L — SIGNIFICANT CHANGE UP (ref 3.5–5.3)
RBC # BLD: 4.11 M/UL — SIGNIFICANT CHANGE UP (ref 3.8–5.2)
RBC # FLD: 13 % — SIGNIFICANT CHANGE UP (ref 10.3–14.5)
SODIUM SERPL-SCNC: 139 MMOL/L — SIGNIFICANT CHANGE UP (ref 135–145)
WBC # BLD: 7.59 K/UL — SIGNIFICANT CHANGE UP (ref 3.8–10.5)
WBC # FLD AUTO: 7.59 K/UL — SIGNIFICANT CHANGE UP (ref 3.8–10.5)

## 2023-11-19 PROCEDURE — 93880 EXTRACRANIAL BILAT STUDY: CPT | Mod: 26

## 2023-11-19 RX ORDER — CHLORHEXIDINE GLUCONATE 213 G/1000ML
1 SOLUTION TOPICAL DAILY
Refills: 0 | Status: COMPLETED | OUTPATIENT
Start: 2023-11-20 | End: 2023-11-20

## 2023-11-19 RX ORDER — POVIDONE-IODINE 5 %
1 AEROSOL (ML) TOPICAL ONCE
Refills: 0 | Status: DISCONTINUED | OUTPATIENT
Start: 2023-11-20 | End: 2023-11-22

## 2023-11-19 RX ADMIN — HEPARIN SODIUM 5000 UNIT(S): 5000 INJECTION INTRAVENOUS; SUBCUTANEOUS at 13:33

## 2023-11-19 RX ADMIN — OXYCODONE HYDROCHLORIDE 2.5 MILLIGRAM(S): 5 TABLET ORAL at 20:15

## 2023-11-19 RX ADMIN — Medication 650 MILLIGRAM(S): at 00:43

## 2023-11-19 RX ADMIN — OXYCODONE HYDROCHLORIDE 2.5 MILLIGRAM(S): 5 TABLET ORAL at 19:55

## 2023-11-19 RX ADMIN — Medication 650 MILLIGRAM(S): at 00:23

## 2023-11-19 RX ADMIN — HEPARIN SODIUM 5000 UNIT(S): 5000 INJECTION INTRAVENOUS; SUBCUTANEOUS at 21:32

## 2023-11-19 RX ADMIN — HEPARIN SODIUM 5000 UNIT(S): 5000 INJECTION INTRAVENOUS; SUBCUTANEOUS at 06:08

## 2023-11-19 NOTE — DISCHARGE NOTE PROVIDER - NSDCCPCAREPLAN_GEN_ALL_CORE_FT
PRINCIPAL DISCHARGE DIAGNOSIS  Diagnosis: Hip fracture, left  Assessment and Plan of Treatment: You were admitted for hip fracture and underwent surgery to fix your fracture. Make sure to follow up with your surgeon as outpatient and report any increasing redness, drainage, pain or swelling over your surgical site.      SECONDARY DISCHARGE DIAGNOSES  Diagnosis: Hypertension  Assessment and Plan of Treatment: You are being treated for high blood pressure.  Continue taking your medication as prescribed to help prevent or minimize your risk of end organ damage.  Follow up with your doctor for routine blood pressure evaluation and medication regimen.  Report any symptoms of headaces with nausea or vomiting, visual changes, heart palpitation, chest pain or shortness.      Diagnosis: Fracture of femoral neck, left  Assessment and Plan of Treatment: You had a fractute and underwent surgery to fix your fracture.    Diagnosis: Fall  Assessment and Plan of Treatment:     Diagnosis: H/O carotid stenosis  Assessment and Plan of Treatment:      PRINCIPAL DISCHARGE DIAGNOSIS  Diagnosis: Hip fracture, left  Assessment and Plan of Treatment: You were admitted for a left hip fracture and underwent surgery on 11/20 to repair it. You were seen by physical therapy and they recommend you go home with outpatient Therapy.  Make sure to follow up with your surgeon as outpatient and report any increasing redness, drainage, pain or swelling over your surgical site. You may take tylenol 650mg every 6 hours for mild pain.      SECONDARY DISCHARGE DIAGNOSES  Diagnosis: Fall  Assessment and Plan of Treatment: Please see instructions for Hip Fracture    Diagnosis: Fracture of femoral neck, left  Assessment and Plan of Treatment: You had a fractute and underwent surgery to fix your fracture.    Diagnosis: Hypertension  Assessment and Plan of Treatment: You are being treated for high blood pressure.  Continue taking your medication as prescribed to help prevent or minimize your risk of end organ damage.  Follow up with your doctor for routine blood pressure evaluation and medication regimen.  Report any symptoms of headaces with nausea or vomiting, visual changes, heart palpitation, chest pain or shortness.      Diagnosis: H/O carotid stenosis  Assessment and Plan of Treatment: You have a history of carotid stenosis, an ultrasound of your carotid arteries was done and did not show significant stenosis. Continue to take aspirin as prescribed and follow up with your PCP.     PRINCIPAL DISCHARGE DIAGNOSIS  Diagnosis: Hip fracture, left  Assessment and Plan of Treatment: You were admitted for a left hip fracture and underwent surgery on 11/20 to repair it. You were seen by physical therapy and they recommend you go home with outpatient Therapy.  Make sure to follow up with your surgeon as outpatient and report any increasing redness, drainage, pain or swelling over your surgical site.   Pain Management- See Attached Medication Reconciliation  Weight Bearing Status: WBAT to LLE with walker  Equipment needs: Commode, Walker  Dressing: Please keep bandage/dressing Clean, Dry, and Intact. Change with waterproof dressing every 4-5 days or with regular dressing (4x4, abd, ACE. mepilex, etc) every 2-3 days.    Incision Site: Absorbable sutures were placed  Dvt prophylaxis: Continue with Aspirin 81mg    PT/Occupational Therapy are Activities of Daily Living as appropriate  Follow up with Orthopedic Surgeon Dr. Héctor Espinoza      SECONDARY DISCHARGE DIAGNOSES  Diagnosis: Fall  Assessment and Plan of Treatment: Please see instructions for Hip Fracture    Diagnosis: Fracture of femoral neck, left  Assessment and Plan of Treatment: You had a fractute and underwent surgery to fix your fracture.    Diagnosis: Hypertension  Assessment and Plan of Treatment: You are being treated for high blood pressure.  Continue taking your medication as prescribed to help prevent or minimize your risk of end organ damage.  Follow up with your doctor for routine blood pressure evaluation and medication regimen.  Report any symptoms of headaces with nausea or vomiting, visual changes, heart palpitation, chest pain or shortness.      Diagnosis: H/O carotid stenosis  Assessment and Plan of Treatment: You have a history of carotid stenosis, an ultrasound of your carotid arteries was done and did not show significant stenosis. Continue to take aspirin as prescribed and follow up with your PCP.

## 2023-11-19 NOTE — DISCHARGE NOTE PROVIDER - NSDCCPTREATMENT_GEN_ALL_CORE_FT
PRINCIPAL PROCEDURE  Procedure: Percutaneous pinning of left hip  Findings and Treatment: S/p Left hip pinning on 11/22/23

## 2023-11-19 NOTE — PROGRESS NOTE ADULT - ASSESSMENT
{\rtf1\cmxbpd62784\ansi\zbjsout2817\ftnbj\uc1\deff0  {\fonttbl{\f0 \fnil Segoe UI;}{\f1 \fnil \fcharset0 Segoe UI;}{\f2 \fnil Times New Chuck;}}  {\colortbl ;\zoq260\vbixj923\jckm221 ;\red0\green0\blue0 ;\red0\green0\dszg387 ;\red0\green0\blue0 ;}  {\stylesheet{\f0\fs20 Normal;}{\cs1 Default Paragraph Font;}{\cs2\f0\fs16 Line Number;}{\cs3\f2\fs24\ul\cf3 Hyperlink;}}  {\*\revtbl{Unknown;}}  \prjtbd08839\bdjgnk04216\drtfg5771\kmlas8619\ndstv2891\dwwlm5637\axracqc683\rgqcudm825\nogrowautofit\ecbuik062\formshade\nofeaturethrottle1\dntblnsbdb\fet4\aendnotes\aftnnrlc\pgbrdrhead\pgbrdrfoot  \sectd\bhujyl25864\wosgnw16380\guttersxn0\wqfvyzzy0245\rbmwkcbd8262\wykrzlmp9494\rbtldxun1145\gyskusq457\jebfrtw213\sbkpage\pgncont\pgndec  \plain\plain\f0\fs24\pard\plain\f0\fs24\plain\f0\fs20\uvqa6642\hich\f0\dbch\f0\loch\f0\fs20\par  _________________________________________________________________________________________\par  ========>>  M E D I C A L   A T T E N D I N G    F O L L O W  U P  N O T E  <<=========\par  -----------------------------------------------------------------------------------------------------\par  \par  - Patient seen and examined by me earlier today. \par  - In summary,  CAROL ROY is a 78y year old woman admitted with fall, left hip fracture \par  - Patient today overall doing ok, comfortable, eating OK. + pain in hip \par  \par  ==================>> REVIEW OF SYSTEM <<=================\par  \par  GEN: no fever, no chills, pain as above and in left ribs >> encouraged to take pain medications as needed ( patient hesitant )\par  RESP: no SOB, no cough, no sputum\par  CVS: no chest pain, no palpitations\par  GI: no abdominal pain, no nausea, no constipation, no diarrhea reported \par  : no dysuria, no frequency\par  Neuro: no headache, no dizziness ( patient denies passing out yesterday) \par  \par  ==================>> PHYSICAL EXAM <<=================\par  \par  GEN: A&O X 3 , NAD , comfortable, pleasant, calm in bed, getting carotid duplex \par  HEENT: NCAT, PERRL, MMM, hearing intact\par  CVS: S1S2 , regular , No M/R/G appreciated\par  PULM: CTA B/L,  no W/R/R appreciated\par  ABD.: soft. non tender, non distended,  bowel sounds present\par  Extrem: intact pulses , no edema \par    left foot internally rotated \par  \par        ( Note written / Date of service 11-19-23 ( This is certified to be the same as "ENTERED" date above ( for billing purposes)))\par    ==================>> MEDICATIONS <<====================\par  \par  MEDICATIONS  (STANDING):\par  heparin   Injectable 5000 Unit(s) SubCutaneous every 8 hours\par  \par  MEDICATIONS  (PRN):\par  acetaminophen     Tablet .. 650 milliGRAM(s) Oral every 6 hours PRN Temp greater or equal to 38C (100.4F), Mild Pain (1 - 3)\par  melatonin 3 milliGRAM(s) Oral at bedtime PRN Insomnia\par  ondansetron Injectable 4 milliGRAM(s) IV Push every 8 hours PRN Nausea and/or Vomiting\par  oxyCODONE    IR 2.5 milliGRAM(s) Oral every 6 hours PRN Moderate Pain (4 - 6)\par  oxyCODONE    IR 5 milliGRAM(s) Oral every 6 hours PRN Severe Pain (7 - 10)\par  \par  ___________\par  Active diet:\par  Diet, NPO: \par  NPO for Procedure/Test\par     NPO Start Date: 19-Nov-2023,   NPO Start Time: 23:59\par  Except Medications\par  Diet, Regular: \par  Lacto-Ovo Veg (Accepts Milk Prod., Eggs)\par  ___________________\par  \par  ==================>> VITAL SIGNS <<==================\par  \par  T(C): 36.8 (11-19-23 @ 05:40), Max: 37.3 (11-18-23 @ 20:39)\par  HR: 52 (11-19-23 @ 05:40) (52 - 68)\par  BP: 128/55 (11-19-23 @ 05:40) (128/55 - 169/83)\par  BP(mean): 80 (11-19-23 @ 05:40)\par  RR: 16 (11-19-23 @ 05:40) (16 - 18)\par  SpO2: 96% (11-19-23 @ 05:40) (91% - 96%) \par  \par   ==================>> LAB AND IMAGING <<==================\par           \par             13.5 \par  7.59  )-----------( 205      ( 19 Nov 2023 05:23 )\par             40.4 \par     \par  11-19\par  \par  139  |  106  |  18\par  ----------------------------<  105<H>\par  3.7   |  32<H>  |  0.68\par  \par  Ca    8.6      19 Nov 2023 05:23\par  Phos  3.2     11-19\par  Mg     2.1     11-19\par  \par  TPro  6.5  /  Alb  3.9  /  TBili  0.4  /  DBili  x   /  AST  26  /  ALT  37  /  AlkPhos  88  11-18\par  \par  PT/INR - ( 18 Nov 2023 11:10 )   PT: 11.0 sec;   INR: 0.96 ratio  \par  PTT - ( 18 Nov 2023 11:10 )  PTT:27.5 sec          \par   \par  carotid duplex negative preliminarily \par  Echo pending \par  ___________________________________________________________________________________\par  ===============>>  A S S E S S M E N T   A N D   P L A N <<===============\par  ------------------------------------------------------------------------------------------\par  \par  \trowd\pqkjua07\lastrow\dyzkpxo40\trpaddfl3\acwujyo71\trpaddfr3\trpaddt0\trpaddft3\trpaddb0\trpaddfb3\trleft0  \clvertalt\dmpoda95\clpadft3\kmgfyg20\clpadfr3\clpadl0\clpadfl3\clpadb0\clpadfb3\doqmp7266  \clvertalt\aipmob11\clpadft3\cyynfo91\clpadfr3\clpadl0\clpadfl3\clpadb0\clpadfb3\vqwdx4369  \pard\intbl\ssparaaux0\s0\fi-120\li120\ql\plain\f0\fs24{\*\bkmkstart [id number]}{\*\bkmkend zv10385179940}\plain\f0\fs20\voql4112\hich\f0\dbch\f0\loch\f0\fs20 \'b7 \plain\f1\fs20\ivuy8872\hich\f1\dbch\f1\loch\f1\cf2\fs20\b Assessment\plain\f0\fs20\qpmb3235\hich\f0\dbch\f0\loch\f0\fs20\cell  \pard\intbl\ssparaaux0\s0\ql\plain\f0\fs24\plain\f0\fs20\abnz7889\hich\f0\dbch\f0\loch\f0\fs20\cell  \intbl\row  \pard\ssparaaux0\s0\ql\plain\f0\fs24\plain\f0\fs20\fzow0916\hich\f0\dbch\f0\loch\f0\fs20 Patient is a 79 y/o F with who ambulates independently with  PMH of HTN and carotid stenosis and PSH of appendectomy presented after a fall. Patient reports that   after her usual run in the park she was walking at a slower pace to cool down when she tripped and fell on her left side. Patient is being admitted for further management of left femoral neck fracture. \par  \plain\f1\fs20\swxx3778\hich\f1\dbch\f1\loch\f1\cf2\fs20\strike\plain\f1\fs20\smhs2336\hich\f1\dbch\f1\loch\f1\cf2\fs20\plain\f0\fs20\jxob6401\hich\f0\dbch\f0\loch\f0\fs20\par  \plain\f1\fs20\xzdb8269\hich\f1\dbch\f1\loch\f1\cf2\fs20\b\ul{\field{\*\fldinst HYPERLINK [id number] }{\fldrslt Problem/Plan - 1:}}\plain\f0\fs20\hniy5372\hich\f0\dbch\f0\loch\f0\fs20\ql\par  \'b7  {\*\bkmkstart pb60907748080}{\*\bkmkend wb51578642757}Problem: {\*\bkmkstart ev66771985359}{\*\bkmkend jx42953507281}Fall. \par  \'b7  {\*\bkmkstart kj86605405128}{\*\bkmkend vp01952436110}Plan: {\*\bkmkstart sb83563882385}{\*\bkmkend qr54895893848}P/w left hip and left chest pain. S/p fall. \par  Xray pelvis - left femoral neck fracture. \par  CT chest - No evidence of acute thoracic traumatic injury on this noncontrast CT chest.\par  Tylenol - mild pain. \par  Oxycodone 2.5 - moderate pain\par  Oxycodone 5.0 - severe pain. \par  Ortho consulted -  Left hip hemiarthroplasty on Monday, 11/20/2023. \par  PT consult after surgery.\par  \par  \plain\f1\fs20\ofsh7588\hich\f1\dbch\f1\loch\f1\cf2\fs20\b\ul{\field{\*\fldinst HYPERLINK [id number] }{\fldrslt Problem/Plan - 2:}}\plain\f0\fs20\etav3797\hich\f0\dbch\f0\loch\f0\fs20\ql\par  \'b7  {\*\bkmkstart dw08258069008}{\*\bkmkend rh67795430608}Problem: {\*\bkmkstart lf94256605551}{\*\bkmkend ln12929169880}Fracture of femoral neck, left. \par  \'b7  {\*\bkmkstart nf47404419781}{\*\bkmkend qw56009445106}Plan: {\*\bkmkstart uu29546294040}{\*\bkmkend qa51201120140}P/w left hip and left chest pain. S/p fall. \par  Xray pelvis - left femoral neck fracture.\par  Tylenol - mild pain. \par  Oxycodone 2.5 - moderate pain\par  Oxycodone 5.0 - severe pain. \par  Ortho consulted -  Left hip hemiarthroplasty on Monday, 11/20/2023. \par  PT consult after surgery.\par  Good functional capacity (METS 4), no exertional chest pain or shortness of breath. \par  F/U echo ( given history of moderate AI) . \par      >> if echo without significant abnormality, patient cleared for Sx with no further workup \par  patient will follow up with primary cardiologist / PMD Dr Burton post op for further management \par  \par  \plain\f1\fs20\jazj9918\hich\f1\dbch\f1\loch\f1\cf2\fs20\b\ul{\field{\*\fldinst HYPERLINK [id number] }{\fldrslt Problem/Plan - 3:}}\plain\f0\fs20\ybda0809\hich\f0\dbch\f0\loch\f0\fs20\ql\par  \'b7  {\*\bkmkstart td77291556753}{\*\bkmkend jz39735994073}Problem: {\*\bkmkstart zi35182048004}{\*\bkmkend xy73141173663}Hypertension. \par  \'b7  {\*\bkmkstart ay01777940839}{\*\bkmkend px17606306168}Plan: {\*\bkmkstart di50647671848}{\*\bkmkend dq53390664260}Not on any medication. \par  Monitor blood pressure.\par  \par  \plain\f1\fs20\rfea7982\hich\f1\dbch\f1\loch\f1\cf2\fs20\b\ul{\field{\*\fldinst HYPERLINK [id number] }{\fldrslt Problem/Plan - 4:}}\plain\f0\fs20\smnw3061\hich\f0\dbch\f0\loch\f0\fs20\ql\par  \'b7  {\*\bkmkstart dw67868226496}{\*\bkmkend sg65467104094}Problem: {\*\bkmkstart ry80218046496}{\*\bkmkend kr93490532570}H/O carotid stenosis. \par  \'b7  {\*\bkmkstart cj48134540294}{\*\bkmkend gb59255659721}Plan: {\*\bkmkstart rb65373267029}{\*\bkmkend ux44097442016}F/U carotid ultrasound.\par    ( prelim negative)\par  \par  \plain\f1\fs20\ilpx3417\hich\f1\dbch\f1\loch\f1\cf2\fs20\b\ul{\field{\*\fldinst HYPERLINK [id number] }{\fldrslt Problem/Plan - 5:}}\plain\f0\fs20\mpiu7948\hich\f0\dbch\f0\loch\f0\fs20\ql\par  \'b7  {\*\bkmkstart di59978096973}{\*\bkmkend uw55715817658}Problem: {\*\bkmkstart vh80277852533}{\*\bkmkend zq93467716657}Prophylactic measure. \par  \'b7  {\*\bkmkstart nn17894220144}{\*\bkmkend qq68334950961}Plan: {\*\bkmkstart uf79144991199}{\*\bkmkend rf28816217071}SCDs.\par  \pard\plain\f0\fs24\plain\f0\fs20\umqx7776\hich\f0\dbch\f0\loch\f0\fs20\par  --------------------------------------------\par  Case discussed with patient \par  Education given on findings and plan of care\par  ___________________________\par  FRANCESCO Padron D.O.\par  Pager: [phone]\par  \par  \ql\plain\f0\fs24\plain\f0\fs20\uywh5861\hich\f0\dbch\f0\loch\f0\fs20\par  }   {\rtf1\zthpdk03272\ansi\aotftyh7523\ftnbj\uc1\deff0  {\fonttbl{\f0 \fnil Segoe UI;}{\f1 \fnil \fcharset0 Segoe UI;}{\f2 \fnil Times New Chuck;}}  {\colortbl ;\uui324\bidvi903\ogkm121 ;\red0\green0\blue0 ;\red0\green0\dyzu514 ;\red0\green0\blue0 ;}  {\stylesheet{\f0\fs20 Normal;}{\cs1 Default Paragraph Font;}{\cs2\f0\fs16 Line Number;}{\cs3\f2\fs24\ul\cf3 Hyperlink;}}  {\*\revtbl{Unknown;}}  \mhqxtr44386\euyqtz68995\evpsx5718\tbdon6073\dcqdt0865\yjoro6500\vmjbgwc423\xabqspm972\nogrowautofit\ovklgg501\formshade\nofeaturethrottle1\dntblnsbdb\fet4\aendnotes\aftnnrlc\pgbrdrhead\pgbrdrfoot  \sectd\joedgp90174\iabcez49113\guttersxn0\gnkoiyxl9114\wdygcfxd0590\mggqzido6296\qdbtlifv3592\limasup983\gufjnhz898\sbkpage\pgncont\pgndec  \plain\plain\f0\fs24\pard\plain\f0\fs24\plain\f0\fs20\ehhf0473\hich\f0\dbch\f0\loch\f0\fs20\par  _________________________________________________________________________________________\par  ========>>  M E D I C A L   A T T E N D I N G    F O L L O W  U P  N O T E  <<=========\par  -----------------------------------------------------------------------------------------------------\par  \par  - Patient seen and examined by me earlier today. \par  - In summary,  CAROL ROY is a 78y year old woman admitted with fall, left hip fracture \par  - Patient today overall doing ok, comfortable, eating OK. + pain in hip \par  \par  ==================>> REVIEW OF SYSTEM <<=================\par  \par  GEN: no fever, no chills, pain as above and in left ribs >> encouraged to take pain medications as needed ( patient hesitant )\par  RESP: no SOB, no cough, no sputum\par  CVS: no chest pain, no palpitations\par  GI: no abdominal pain, no nausea, no constipation, no diarrhea reported \par  : no dysuria, no frequency\par  Neuro: no headache, no dizziness ( patient denies passing out yesterday) \par  \par  ==================>> PHYSICAL EXAM <<=================\par  \par  GEN: A&O X 3 , NAD , comfortable, pleasant, calm in bed, getting carotid duplex \par  HEENT: NCAT, PERRL, MMM, hearing intact\par  CVS: S1S2 , regular , No M/R/G appreciated\par  PULM: CTA B/L,  no W/R/R appreciated\par  ABD.: soft. non tender, non distended,  bowel sounds present\par  Extrem: intact pulses , no edema \par    left foot internally rotated \par  \par        ( Note written / Date of service 11-19-23 ( This is certified to be the same as "ENTERED" date above ( for billing purposes)))\par    ==================>> MEDICATIONS <<====================\par  \par  MEDICATIONS  (STANDING):\par  heparin   Injectable 5000 Unit(s) SubCutaneous every 8 hours\par  \par  MEDICATIONS  (PRN):\par  acetaminophen     Tablet .. 650 milliGRAM(s) Oral every 6 hours PRN Temp greater or equal to 38C (100.4F), Mild Pain (1 - 3)\par  melatonin 3 milliGRAM(s) Oral at bedtime PRN Insomnia\par  ondansetron Injectable 4 milliGRAM(s) IV Push every 8 hours PRN Nausea and/or Vomiting\par  oxyCODONE    IR 2.5 milliGRAM(s) Oral every 6 hours PRN Moderate Pain (4 - 6)\par  oxyCODONE    IR 5 milliGRAM(s) Oral every 6 hours PRN Severe Pain (7 - 10)\par  \par  ___________\par  Active diet:\par  Diet, NPO: \par  NPO for Procedure/Test\par     NPO Start Date: 19-Nov-2023,   NPO Start Time: 23:59\par  Except Medications\par  Diet, Regular: \par  Lacto-Ovo Veg (Accepts Milk Prod., Eggs)\par  ___________________\par  \par  ==================>> VITAL SIGNS <<==================\par  \par  T(C): 36.8 (11-19-23 @ 05:40), Max: 37.3 (11-18-23 @ 20:39)\par  HR: 52 (11-19-23 @ 05:40) (52 - 68)\par  BP: 128/55 (11-19-23 @ 05:40) (128/55 - 169/83)\par  BP(mean): 80 (11-19-23 @ 05:40)\par  RR: 16 (11-19-23 @ 05:40) (16 - 18)\par  SpO2: 96% (11-19-23 @ 05:40) (91% - 96%) \par  \par   ==================>> LAB AND IMAGING <<==================\par           \par             13.5 \par  7.59  )-----------( 205      ( 19 Nov 2023 05:23 )\par             40.4 \par     \par  11-19\par  \par  139  |  106  |  18\par  ----------------------------<  105<H>\par  3.7   |  32<H>  |  0.68\par  \par  Ca    8.6      19 Nov 2023 05:23\par  Phos  3.2     11-19\par  Mg     2.1     11-19\par  \par  TPro  6.5  /  Alb  3.9  /  TBili  0.4  /  DBili  x   /  AST  26  /  ALT  37  /  AlkPhos  88  11-18\par  \par  PT/INR - ( 18 Nov 2023 11:10 )   PT: 11.0 sec;   INR: 0.96 ratio  \par  PTT - ( 18 Nov 2023 11:10 )  PTT:27.5 sec          \par   \par  carotid duplex negative preliminarily \par  Echo pending \par  ___________________________________________________________________________________\par  ===============>>  A S S E S S M E N T   A N D   P L A N <<===============\par  ------------------------------------------------------------------------------------------\par  \par  \trowd\aozmfw24\lastrow\junmiyo72\trpaddfl3\sujjrfv06\trpaddfr3\trpaddt0\trpaddft3\trpaddb0\trpaddfb3\trleft0  \clvertalt\fnjbvu43\clpadft3\iclmpr25\clpadfr3\clpadl0\clpadfl3\clpadb0\clpadfb3\xfqgc5150  \clvertalt\nbndrm94\clpadft3\gmkkbs74\clpadfr3\clpadl0\clpadfl3\clpadb0\clpadfb3\igqvd8821  \pard\intbl\ssparaaux0\s0\fi-120\li120\ql\plain\f0\fs24{\*\bkmkstart vv71021652468}{\*\bkmkend hj70827203137}\plain\f0\fs20\vecp3367\hich\f0\dbch\f0\loch\f0\fs20 \'b7 \plain\f1\fs20\osgp8939\hich\f1\dbch\f1\loch\f1\cf2\fs20\b Assessment\plain\f0\fs20\sevf9610\hich\f0\dbch\f0\loch\f0\fs20\cell  \pard\intbl\ssparaaux0\s0\ql\plain\f0\fs24\plain\f0\fs20\unvu0176\hich\f0\dbch\f0\loch\f0\fs20\cell  \intbl\row  \pard\ssparaaux0\s0\ql\plain\f0\fs24\plain\f0\fs20\wsje6936\hich\f0\dbch\f0\loch\f0\fs20 Patient is a 79 y/o F with who ambulates independently with  PMH of HTN and carotid stenosis and PSH of appendectomy presented after a fall. Patient reports that   after her usual run in the park she was walking at a slower pace to cool down when she tripped and fell on her left side. Patient is being admitted for further management of left femoral neck fracture. \par  \plain\f1\fs20\jcwa0349\hich\f1\dbch\f1\loch\f1\cf2\fs20\strike\plain\f1\fs20\yitk4588\hich\f1\dbch\f1\loch\f1\cf2\fs20\plain\f0\fs20\hueu4560\hich\f0\dbch\f0\loch\f0\fs20\par  \plain\f1\fs20\gjhv7828\hich\f1\dbch\f1\loch\f1\cf2\fs20\b\ul{\field{\*\fldinst HYPERLINK [id number] }{\fldrslt Problem/Plan - 1:}}\plain\f0\fs20\zkml5221\hich\f0\dbch\f0\loch\f0\fs20\ql\par  \'b7  {\*\bkmkstart kq18122715073}{\*\bkmkend ek53519842266}Problem: {\*\bkmkstart at88838726098}{\*\bkmkend lb60961335646}Fall. \par  \'b7  {\*\bkmkstart ey10338805953}{\*\bkmkend ob05619302827}Plan: {\*\bkmkstart bz59484660871}{\*\bkmkend ni62206401058}P/w left hip and left chest pain. S/p fall. \par  Xray pelvis - left femoral neck fracture. \par  CT chest - No evidence of acute thoracic traumatic injury on this noncontrast CT chest.\par  Tylenol - mild pain. \par  Oxycodone 2.5 - moderate pain\par  Oxycodone 5.0 - severe pain. \par  Ortho consulted -  Left hip hemiarthroplasty on Monday, 11/20/2023. \par  PT consult after surgery.\par  \par  \plain\f1\fs20\lkue9018\hich\f1\dbch\f1\loch\f1\cf2\fs20\b\ul{\field{\*\fldinst HYPERLINK [id number] }{\fldrslt Problem/Plan - 2:}}\plain\f0\fs20\srgj9194\hich\f0\dbch\f0\loch\f0\fs20\ql\par  \'b7  {\*\bkmkstart bb43258532256}{\*\bkmkend gy24348043355}Problem: {\*\bkmkstart ef20355564863}{\*\bkmkend xo99011899607}Fracture of femoral neck, left. \par  \'b7  {\*\bkmkstart to18552867752}{\*\bkmkend uw78361445535}Plan: {\*\bkmkstart qi90358587208}{\*\bkmkend nk42973260876}P/w left hip and left chest pain. S/p fall. \par  Xray pelvis - left femoral neck fracture.\par  Tylenol - mild pain. \par  Oxycodone 2.5 - moderate pain\par  Oxycodone 5.0 - severe pain. \par  Ortho consulted -  Left hip hemiarthroplasty on Monday, 11/20/2023. \par  PT consult after surgery.\par  Good functional capacity (METS 4), no exertional chest pain or shortness of breath. \par  F/U echo ( given history of moderate AI) . \par      >> if echo without significant abnormality, patient cleared for Sx with no further workup \par  patient will follow up with primary cardiologist / PMD Dr Burton post op for further management \par  \par  \plain\f1\fs20\kmvt2036\hich\f1\dbch\f1\loch\f1\cf2\fs20\b\ul{\field{\*\fldinst HYPERLINK [id number] }{\fldrslt Problem/Plan - 3:}}\plain\f0\fs20\vrtb5825\hich\f0\dbch\f0\loch\f0\fs20\ql\par  \'b7  {\*\bkmkstart ts80810502227}{\*\bkmkend as13143427996}Problem: {\*\bkmkstart ym46143553437}{\*\bkmkend qw92736813018}Hypertension. \par  \'b7  {\*\bkmkstart gg32523603205}{\*\bkmkend bm11150046932}Plan: {\*\bkmkstart li01963633399}{\*\bkmkend il52229209374}Not on any medication. \par  Monitor blood pressure.\par  \par  \plain\f1\fs20\ebne9534\hich\f1\dbch\f1\loch\f1\cf2\fs20\b\ul{\field{\*\fldinst HYPERLINK [id number] }{\fldrslt Problem/Plan - 4:}}\plain\f0\fs20\rhck3579\hich\f0\dbch\f0\loch\f0\fs20\ql\par  \'b7  {\*\bkmkstart uq58277913091}{\*\bkmkend wa96482491082}Problem: {\*\bkmkstart ex25661115429}{\*\bkmkend la83555256358}H/O carotid stenosis. \par  \'b7  {\*\bkmkstart ks31745305921}{\*\bkmkend yu67784313166}Plan: {\*\bkmkstart tc19737532907}{\*\bkmkend dj12438020358}F/U carotid ultrasound.\par    ( prelim negative)\par  \par  \plain\f1\fs20\ypvk9695\hich\f1\dbch\f1\loch\f1\cf2\fs20\b\ul{\field{\*\fldinst HYPERLINK [id number] }{\fldrslt Problem/Plan - 5:}}\plain\f0\fs20\orfw2224\hich\f0\dbch\f0\loch\f0\fs20\ql\par  \'b7  {\*\bkmkstart fg39401078979}{\*\bkmkend kn61498198280}Problem: {\*\bkmkstart gn67577931788}{\*\bkmkend gm04498836289}Prophylactic measure. \par  \'b7  {\*\bkmkstart kv57254738744}{\*\bkmkend mo73839973735}Plan: {\*\bkmkstart wr47371759928}{\*\bkmkend fe09659734428}SCDs.\par  \pard\plain\f0\fs24\plain\f0\fs20\ifqk5996\hich\f0\dbch\f0\loch\f0\fs20\par  --------------------------------------------\par  Case discussed with patient \par  Education given on findings and plan of care\par  ___________________________\par  FRANCESCO Padron D.O.\par  Pager: [phone]\par  \par  \ql\plain\f0\fs24\plain\f0\fs20\kqia9711\hich\f0\dbch\f0\loch\f0\fs20\par  }   {\rtf1\vilcyc56441\ansi\lalyfgz4504\ftnbj\uc1\deff0  {\fonttbl{\f0 \fnil Segoe UI;}{\f1 \fnil \fcharset0 Segoe UI;}{\f2 \fnil Times New Chuck;}}  {\colortbl ;\efs547\vyfdl480\pznh635 ;\red0\green0\blue0 ;\red0\green0\mpze978 ;\red0\green0\blue0 ;}  {\stylesheet{\f0\fs20 Normal;}{\cs1 Default Paragraph Font;}{\cs2\f0\fs16 Line Number;}{\cs3\f2\fs24\ul\cf3 Hyperlink;}}  {\*\revtbl{Unknown;}}  \rvvtcv48024\vqxkfs60153\pyajp3401\rievl8724\wyawc3224\wamur8645\ivzenhq600\jpykobr952\nogrowautofit\aowsyi753\formshade\nofeaturethrottle1\dntblnsbdb\fet4\aendnotes\aftnnrlc\pgbrdrhead\pgbrdrfoot  \sectd\rbduht36970\teypwp00286\guttersxn0\pmmunnkc8959\kltlthul7295\cpstinru2317\gjytyiox9219\gbbiycy243\msguzli198\sbkpage\pgncont\pgndec  \plain\plain\f0\fs24\pard\plain\f0\fs24\plain\f0\fs20\pnvs2530\hich\f0\dbch\f0\loch\f0\fs20\par  _________________________________________________________________________________________\par  ========>>  M E D I C A L   A T T E N D I N G    F O L L O W  U P  N O T E  <<=========\par  -----------------------------------------------------------------------------------------------------\par  \par  - Patient seen and examined by me earlier today. \par  - In summary,  CAROL ROY is a 78y year old woman admitted with fall, left hip fracture \par  - Patient today overall doing ok, comfortable, eating OK. + pain in hip \par  \par  ==================>> REVIEW OF SYSTEM <<=================\par  \par  GEN: no fever, no chills, pain as above and in left ribs >> encouraged to take pain medications as needed ( patient hesitant )\par  RESP: no SOB, no cough, no sputum\par  CVS: no chest pain, no palpitations\par  GI: no abdominal pain, no nausea, no constipation, no diarrhea reported \par  : no dysuria, no frequency\par  Neuro: no headache, no dizziness ( patient denies passing out yesterday) \par  \par  ==================>> PHYSICAL EXAM <<=================\par  \par  GEN: A&O X 3 , NAD , comfortable, pleasant, calm in bed, getting carotid duplex \par  HEENT: NCAT, PERRL, MMM, hearing intact\par  CVS: S1S2 , regular , No M/R/G appreciated\par  PULM: CTA B/L,  no W/R/R appreciated\par  ABD.: soft. non tender, non distended,  bowel sounds present\par  Extrem: intact pulses , no edema \par    left foot internally rotated \par  \par        ( Note written / Date of service 11-19-23 ( This is certified to be the same as "ENTERED" date above ( for billing purposes)))\par    ==================>> MEDICATIONS <<====================\par  \par  MEDICATIONS  (STANDING):\par  heparin   Injectable 5000 Unit(s) SubCutaneous every 8 hours\par  \par  MEDICATIONS  (PRN):\par  acetaminophen     Tablet .. 650 milliGRAM(s) Oral every 6 hours PRN Temp greater or equal to 38C (100.4F), Mild Pain (1 - 3)\par  melatonin 3 milliGRAM(s) Oral at bedtime PRN Insomnia\par  ondansetron Injectable 4 milliGRAM(s) IV Push every 8 hours PRN Nausea and/or Vomiting\par  oxyCODONE    IR 2.5 milliGRAM(s) Oral every 6 hours PRN Moderate Pain (4 - 6)\par  oxyCODONE    IR 5 milliGRAM(s) Oral every 6 hours PRN Severe Pain (7 - 10)\par  \par  ___________\par  Active diet:\par  Diet, NPO: \par  NPO for Procedure/Test\par     NPO Start Date: 19-Nov-2023,   NPO Start Time: 23:59\par  Except Medications\par  Diet, Regular: \par  Lacto-Ovo Veg (Accepts Milk Prod., Eggs)\par  ___________________\par  \par  ==================>> VITAL SIGNS <<==================\par  \par  T(C): 36.8 (11-19-23 @ 05:40), Max: 37.3 (11-18-23 @ 20:39)\par  HR: 52 (11-19-23 @ 05:40) (52 - 68)\par  BP: 128/55 (11-19-23 @ 05:40) (128/55 - 169/83)\par  BP(mean): 80 (11-19-23 @ 05:40)\par  RR: 16 (11-19-23 @ 05:40) (16 - 18)\par  SpO2: 96% (11-19-23 @ 05:40) (91% - 96%) \par  \par   ==================>> LAB AND IMAGING <<==================\par           \par             13.5 \par  7.59  )-----------( 205      ( 19 Nov 2023 05:23 )\par             40.4 \par     \par  11-19\par  \par  139  |  106  |  18\par  ----------------------------<  105<H>\par  3.7   |  32<H>  |  0.68\par  \par  Ca    8.6      19 Nov 2023 05:23\par  Phos  3.2     11-19\par  Mg     2.1     11-19\par  \par  TPro  6.5  /  Alb  3.9  /  TBili  0.4  /  DBili  x   /  AST  26  /  ALT  37  /  AlkPhos  88  11-18\par  \par  PT/INR - ( 18 Nov 2023 11:10 )   PT: 11.0 sec;   INR: 0.96 ratio  \par  PTT - ( 18 Nov 2023 11:10 )  PTT:27.5 sec          \par   \par  carotid duplex negative preliminarily \par  Echo pending \par  ___________________________________________________________________________________\par  ===============>>  A S S E S S M E N T   A N D   P L A N <<===============\par  ------------------------------------------------------------------------------------------\par  \par  \trowd\mqsolk65\lastrow\dcqtald54\trpaddfl3\qyptzcg65\trpaddfr3\trpaddt0\trpaddft3\trpaddb0\trpaddfb3\trleft0  \clvertalt\jyympu12\clpadft3\zgegrl81\clpadfr3\clpadl0\clpadfl3\clpadb0\clpadfb3\sdpja0664  \clvertalt\szvnbz20\clpadft3\gldbwv10\clpadfr3\clpadl0\clpadfl3\clpadb0\clpadfb3\lphhd2041  \pard\intbl\ssparaaux0\s0\fi-120\li120\ql\plain\f0\fs24{\*\bkmkstart [id number]}{\*\bkmkend [id number]}\plain\f0\fs20\knye6632\hich\f0\dbch\f0\loch\f0\fs20 \'b7 \plain\f1\fs20\suai0982\hich\f1\dbch\f1\loch\f1\cf2\fs20\b Assessment\plain\f0\fs20\nhhc1441\hich\f0\dbch\f0\loch\f0\fs20\cell  \pard\intbl\ssparaaux0\s0\ql\plain\f0\fs24\plain\f0\fs20\scki7513\hich\f0\dbch\f0\loch\f0\fs20\cell  \intbl\row  \pard\ssparaaux0\s0\ql\plain\f0\fs24\plain\f0\fs20\ucoh9541\hich\f0\dbch\f0\loch\f0\fs20 Patient is a 79 y/o F with who ambulates independently with  PMH of HTN and carotid stenosis and PSH of appendectomy presented after a fall. Patient reports that   after her usual run in the park she was walking at a slower pace to cool down when she tripped and fell on her left side. Patient is being admitted for further management of left femoral neck fracture. \par  \plain\f1\fs20\vegx1183\hich\f1\dbch\f1\loch\f1\cf2\fs20\strike\plain\f1\fs20\feho9408\hich\f1\dbch\f1\loch\f1\cf2\fs20\plain\f0\fs20\nwga5813\hich\f0\dbch\f0\loch\f0\fs20\par  \plain\f1\fs20\bsxi9771\hich\f1\dbch\f1\loch\f1\cf2\fs20\b\ul{\field{\*\fldinst HYPERLINK [id number] }{\fldrslt Problem/Plan - 1:}}\plain\f0\fs20\ppso7510\hich\f0\dbch\f0\loch\f0\fs20\ql\par  \'b7  {\*\bkmkstart [id number]}{\*\bkmkend zj15723386503}Problem: {\*\bkmkstart ks51957191323}{\*\bkmkend nm59019440290}Fall. \par  \'b7  {\*\bkmkstart vj82674142138}{\*\bkmkend ls61671294262}Plan: {\*\bkmkstart ku16845095784}{\*\bkmkend tr64013040169}P/w left hip and left chest pain. S/p fall. \par  Xray pelvis - left femoral neck fracture. \par  CT chest - No evidence of acute thoracic traumatic injury on this noncontrast CT chest.\par  Tylenol - mild pain. \par  Oxycodone 2.5 - moderate pain\par  Oxycodone 5.0 - severe pain. \par  Ortho consulted -  Left hip hemiarthroplasty on Monday, 11/20/2023. \par  PT consult after surgery.\par  \par  \plain\f1\fs20\qgax6449\hich\f1\dbch\f1\loch\f1\cf2\fs20\b\ul{\field{\*\fldinst HYPERLINK [id number] }{\fldrslt Problem/Plan - 2:}}\plain\f0\fs20\fbzj9038\hich\f0\dbch\f0\loch\f0\fs20\ql\par  \'b7  {\*\bkmkstart [id number]}{\*\bkmkend [id number]}Problem: {\*\bkmkstart ug82465108050}{\*\bkmkend zd72479811747}Fracture of femoral neck, left. \par  \'b7  {\*\bkmkstart fw87481693967}{\*\bkmkend rx08084162504}Plan: {\*\bkmkstart yz88172502813}{\*\bkmkend pe57919864996}P/w left hip and left chest pain. S/p fall. \par  Xray pelvis - left femoral neck fracture.\par  Tylenol - mild pain. \par  Oxycodone 2.5 - moderate pain\par  Oxycodone 5.0 - severe pain. \par  Ortho consulted -  Left hip hemiarthroplasty on Monday, 11/20/2023. \par  PT consult after surgery.\par  Good functional capacity (METS 4), no exertional chest pain or shortness of breath. \par  F/U echo ( given history of moderate AI) . \par      >> if echo without significant abnormality, patient cleared for Sx with no further workup \par  patient will follow up with primary cardiologist / PMD Dr Burton post op for further management \par  \par  \plain\f1\fs20\rhnk3354\hich\f1\dbch\f1\loch\f1\cf2\fs20\b\ul{\field{\*\fldinst HYPERLINK [id number] }{\fldrslt Problem/Plan - 3:}}\plain\f0\fs20\vrca2806\hich\f0\dbch\f0\loch\f0\fs20\ql\par  \'b7  {\*\bkmkstart ww26020274298}{\*\bkmkend sz67533260000}Problem: {\*\bkmkstart mq63597845301}{\*\bkmkend pn62300210673}Hypertension. \par  \'b7  {\*\bkmkstart zk41944139197}{\*\bkmkend de96094110925}Plan: {\*\bkmkstart ae35189222892}{\*\bkmkend vf82166432363}Not on any medication. \par  Monitor blood pressure.\par  \par  \plain\f1\fs20\sbmq1535\hich\f1\dbch\f1\loch\f1\cf2\fs20\b\ul{\field{\*\fldinst HYPERLINK [id number] }{\fldrslt Problem/Plan - 4:}}\plain\f0\fs20\lufp5989\hich\f0\dbch\f0\loch\f0\fs20\ql\par  \'b7  {\*\bkmkstart lk14177181034}{\*\bkmkend kn23778493243}Problem: {\*\bkmkstart ms13449392434}{\*\bkmkend li22240118263}H/O carotid stenosis. \par  \'b7  {\*\bkmkstart gf79636877849}{\*\bkmkend hv50264704128}Plan: {\*\bkmkstart uc17958595332}{\*\bkmkend ac95686779911}F/U carotid ultrasound.\par    ( prelim negative)\par  \par  \plain\f1\fs20\dqqn1548\hich\f1\dbch\f1\loch\f1\cf2\fs20\b\ul{\field{\*\fldinst HYPERLINK [id number] }{\fldrslt Problem/Plan - 5:}}\plain\f0\fs20\evoi4972\hich\f0\dbch\f0\loch\f0\fs20\ql\par  \'b7  {\*\bkmkstart pb54919824695}{\*\bkmkend fy13305564755}Problem: {\*\bkmkstart ag19537358530}{\*\bkmkend pv72187087323}Prophylactic measure. \par  \'b7  {\*\bkmkstart ym49969983791}{\*\bkmkend pz87430921597}Plan: {\*\bkmkstart eg18835347761}{\*\bkmkend vi48821793475}SCDs.\par  \pard\plain\f0\fs24\plain\f0\fs20\dwml3905\hich\f0\dbch\f0\loch\f0\fs20\par  --------------------------------------------\par  Case discussed with patient \par  Education given on findings and plan of care\par  ___________________________\par  FRANCESCO Padron D.O.\par  Pager: [phone]\par  \par  \ql\plain\f0\fs24\plain\f0\fs20\qmme7329\hich\f0\dbch\f0\loch\f0\fs20\par  }     _________________________________________________________________________________________  ========>>  M E D I C A L   A T T E N D I N G    F O L L O W  U P  N O T E  <<=========  -----------------------------------------------------------------------------------------------------    - Patient seen and examined by me earlier today.   - In summary,  CAROL ROY is a 78y year old woman admitted with fall, left hip fracture   - Patient today overall doing ok, comfortable, eating OK. + pain in hip     ==================>> REVIEW OF SYSTEM <<=================    GEN: no fever, no chills, pain as above and in left ribs >> encouraged to take pain medications as needed ( patient hesitant )  RESP: no SOB, no cough, no sputum  CVS: no chest pain, no palpitations  GI: no abdominal pain, no nausea, no constipation, no diarrhea reported   : no dysuria, no frequency  Neuro: no headache, no dizziness ( patient denies passing out yesterday)     ==================>> PHYSICAL EXAM <<=================    GEN: A&O X 3 , NAD , comfortable, pleasant, calm in bed, getting carotid duplex   HEENT: NCAT, PERRL, MMM, hearing intact  CVS: S1S2 , regular , No M/R/G appreciated  PULM: CTA B/L,  no W/R/R appreciated  ABD.: soft. non tender, non distended,  bowel sounds present  Extrem: intact pulses , no edema     left foot internally rotated           ( Note written / Date of service 11-19-23 ( This is certified to be the same as "ENTERED" date above ( for billing purposes)))    ==================>> MEDICATIONS <<====================    MEDICATIONS  (STANDING):  heparin   Injectable 5000 Unit(s) SubCutaneous every 8 hours    MEDICATIONS  (PRN):  acetaminophen     Tablet .. 650 milliGRAM(s) Oral every 6 hours PRN Temp greater or equal to 38C (100.4F), Mild Pain (1 - 3)  melatonin 3 milliGRAM(s) Oral at bedtime PRN Insomnia  ondansetron Injectable 4 milliGRAM(s) IV Push every 8 hours PRN Nausea and/or Vomiting  oxyCODONE    IR 2.5 milliGRAM(s) Oral every 6 hours PRN Moderate Pain (4 - 6)  oxyCODONE    IR 5 milliGRAM(s) Oral every 6 hours PRN Severe Pain (7 - 10)    ___________  Active diet:  Diet, NPO:   NPO for Procedure/Test     NPO Start Date: 19-Nov-2023,   NPO Start Time: 23:59  Except Medications  Diet, Regular:   Lacto-Ovo Veg (Accepts Milk Prod., Eggs)  ___________________    ==================>> VITAL SIGNS <<==================    T(C): 36.8 (11-19-23 @ 05:40), Max: 37.3 (11-18-23 @ 20:39)  HR: 52 (11-19-23 @ 05:40) (52 - 68)  BP: 128/55 (11-19-23 @ 05:40) (128/55 - 169/83)  BP(mean): 80 (11-19-23 @ 05:40)  RR: 16 (11-19-23 @ 05:40) (16 - 18)  SpO2: 96% (11-19-23 @ 05:40) (91% - 96%)      ==================>> LAB AND IMAGING <<==================                        13.5   7.59  )-----------( 205      ( 19 Nov 2023 05:23 )             40.4        11-19    139  |  106  |  18  ----------------------------<  105<H>  3.7   |  32<H>  |  0.68    Ca    8.6      19 Nov 2023 05:23  Phos  3.2     11-19  Mg     2.1     11-19    TPro  6.5  /  Alb  3.9  /  TBili  0.4  /  DBili  x   /  AST  26  /  ALT  37  /  AlkPhos  88  11-18    PT/INR - ( 18 Nov 2023 11:10 )   PT: 11.0 sec;   INR: 0.96 ratio    PTT - ( 18 Nov 2023 11:10 )  PTT:27.5 sec               carotid duplex negative preliminarily   Echo pending   ___________________________________________________________________________________  ===============>>  A S S E S S M E N T   A N D   P L A N <<===============  ------------------------------------------------------------------------------------------    · Assessment	  Patient is a 79 y/o F with who ambulates independently with  PMH of HTN and carotid stenosis and PSH of appendectomy presented after a fall. Patient reports that after her usual run in the park she was walking at a slower pace to cool down when she tripped and fell on her left side. Patient is being admitted for further management of left femoral neck fracture.     Problem/Plan - 1:  ·  Problem: Fall.   ·  Plan: P/w left hip and left chest pain. S/p fall.   Xray pelvis - left femoral neck fracture.   CT chest - No evidence of acute thoracic traumatic injury on this noncontrast CT chest.  Tylenol - mild pain.   Oxycodone 2.5 - moderate pain  Oxycodone 5.0 - severe pain.   Ortho consulted -  Left hip hemiarthroplasty on Monday, 11/20/2023.   PT consult after surgery.    Problem/Plan - 2:  ·  Problem: Fracture of femoral neck, left.   ·  Plan: P/w left hip and left chest pain. S/p fall.   Xray pelvis - left femoral neck fracture.  pain management as above  check vitamin D level  will need Bone density scan as outpatient ( discussed with pt)   Ortho consulted -  Left hip hemiarthroplasty on Monday, 11/20/2023.   PT consult after surgery.  Good functional capacity (METS 4), no exertional chest pain or shortness of breath.   F/U echo ( given history of moderate AI) .       >> if echo without significant abnormality, patient cleared for Sx with no further workup   patient will follow up with primary cardiologist / PMD Dr Burton post op for further management       Problem/Plan - 3:  ·  Problem: Hypertension.   ·  Plan: Not on any medication.   Monitor blood pressure.    Problem/Plan - 4:  ·  Problem: H/O carotid stenosis.   ·  Plan: F/U carotid ultrasound.    ( prelim negative)    Problem/Plan - 5:  ·  Problem: Prophylactic measure.   ·  Plan: SCDs.    --------------------------------------------  Case discussed with patient   Education given on findings and plan of care  ___________________________  FRANCESCO Padron D.O.  Pager: [phone]       _________________________________________________________________________________________  ========>>  M E D I C A L   A T T E N D I N G    F O L L O W  U P  N O T E  <<=========  -----------------------------------------------------------------------------------------------------    - Patient seen and examined by me earlier today.   - In summary,  CAROL ROY is a 78y year old woman admitted with fall, left hip fracture   - Patient today overall doing ok, comfortable, eating OK. + pain in hip     ==================>> REVIEW OF SYSTEM <<=================    GEN: no fever, no chills, pain as above and in left ribs >> encouraged to take pain medications as needed ( patient hesitant )  RESP: no SOB, no cough, no sputum  CVS: no chest pain, no palpitations  GI: no abdominal pain, no nausea, no constipation, no diarrhea reported   : no dysuria, no frequency  Neuro: no headache, no dizziness ( patient denies passing out yesterday)     ==================>> PHYSICAL EXAM <<=================    GEN: A&O X 3 , NAD , comfortable, pleasant, calm in bed, getting carotid duplex   HEENT: NCAT, PERRL, MMM, hearing intact  CVS: S1S2 , regular , No M/R/G appreciated  PULM: CTA B/L,  no W/R/R appreciated  ABD.: soft. non tender, non distended,  bowel sounds present  Extrem: intact pulses , no edema     left foot internally rotated           ( Note written / Date of service 11-19-23 ( This is certified to be the same as "ENTERED" date above ( for billing purposes)))    ==================>> MEDICATIONS <<====================    MEDICATIONS  (STANDING):  heparin   Injectable 5000 Unit(s) SubCutaneous every 8 hours    MEDICATIONS  (PRN):  acetaminophen     Tablet .. 650 milliGRAM(s) Oral every 6 hours PRN Temp greater or equal to 38C (100.4F), Mild Pain (1 - 3)  melatonin 3 milliGRAM(s) Oral at bedtime PRN Insomnia  ondansetron Injectable 4 milliGRAM(s) IV Push every 8 hours PRN Nausea and/or Vomiting  oxyCODONE    IR 2.5 milliGRAM(s) Oral every 6 hours PRN Moderate Pain (4 - 6)  oxyCODONE    IR 5 milliGRAM(s) Oral every 6 hours PRN Severe Pain (7 - 10)    ___________  Active diet:  Diet, NPO:   NPO for Procedure/Test     NPO Start Date: 19-Nov-2023,   NPO Start Time: 23:59  Except Medications  Diet, Regular:   Lacto-Ovo Veg (Accepts Milk Prod., Eggs)  ___________________    ==================>> VITAL SIGNS <<==================    T(C): 36.8 (11-19-23 @ 05:40), Max: 37.3 (11-18-23 @ 20:39)  HR: 52 (11-19-23 @ 05:40) (52 - 68)  BP: 128/55 (11-19-23 @ 05:40) (128/55 - 169/83)  BP(mean): 80 (11-19-23 @ 05:40)  RR: 16 (11-19-23 @ 05:40) (16 - 18)  SpO2: 96% (11-19-23 @ 05:40) (91% - 96%)      ==================>> LAB AND IMAGING <<==================                        13.5   7.59  )-----------( 205      ( 19 Nov 2023 05:23 )             40.4        11-19    139  |  106  |  18  ----------------------------<  105<H>  3.7   |  32<H>  |  0.68    Ca    8.6      19 Nov 2023 05:23  Phos  3.2     11-19  Mg     2.1     11-19    TPro  6.5  /  Alb  3.9  /  TBili  0.4  /  DBili  x   /  AST  26  /  ALT  37  /  AlkPhos  88  11-18    PT/INR - ( 18 Nov 2023 11:10 )   PT: 11.0 sec;   INR: 0.96 ratio    PTT - ( 18 Nov 2023 11:10 )  PTT:27.5 sec               carotid duplex negative preliminarily   Echo pending   ___________________________________________________________________________________  ===============>>  A S S E S S M E N T   A N D   P L A N <<===============  ------------------------------------------------------------------------------------------    · Assessment	  Patient is a 77 y/o F with who ambulates independently with  PMH of HTN and carotid stenosis and PSH of appendectomy presented after a fall. Patient reports that after her usual run in the park she was walking at a slower pace to cool down when she tripped and fell on her left side. Patient is being admitted for further management of left femoral neck fracture.     Problem/Plan - 1:  ·  Problem: Fall.   ·  Plan: P/w left hip and left chest pain. S/p fall.   Xray pelvis - left femoral neck fracture.   CT chest - No evidence of acute thoracic traumatic injury on this noncontrast CT chest.  Tylenol - mild pain.   Oxycodone 2.5 - moderate pain  Oxycodone 5.0 - severe pain.   Ortho consulted -  Left hip hemiarthroplasty on Monday, 11/20/2023.   PT consult after surgery.    Problem/Plan - 2:  ·  Problem: Fracture of femoral neck, left.   ·  Plan: P/w left hip and left chest pain. S/p fall.   Xray pelvis - left femoral neck fracture.  pain management as above  check vitamin D level  will need Bone density scan as outpatient ( discussed with pt)   Ortho consulted -  Left hip hemiarthroplasty on Monday, 11/20/2023.   PT consult after surgery.  Good functional capacity (METS 4), no exertional chest pain or shortness of breath.   F/U echo ( given history of moderate AI) .       >> if echo without significant abnormality, patient cleared for Sx with no further workup   patient will follow up with primary cardiologist / PMD Dr Burton post op for further management       Problem/Plan - 3:  ·  Problem: Hypertension.   ·  Plan: Not on any medication.   Monitor blood pressure.    Problem/Plan - 4:  ·  Problem: H/O carotid stenosis.   ·  Plan: F/U carotid ultrasound.    ( prelim negative)    Problem/Plan - 5:  ·  Problem: Prophylactic measure.   ·  Plan: SCDs.    --------------------------------------------  Case discussed with patient   Education given on findings and plan of care  ___________________________  FRANCESCO Padron D.O.  Pager: [phone]       _________________________________________________________________________________________  ========>>  M E D I C A L   A T T E N D I N G    F O L L O W  U P  N O T E  <<=========  -----------------------------------------------------------------------------------------------------    - Patient seen and examined by me earlier today.   - In summary,  CAROL ROY is a 78y year old woman admitted with fall, left hip fracture   - Patient today overall doing ok, comfortable, eating OK. + pain in hip     ==================>> REVIEW OF SYSTEM <<=================    GEN: no fever, no chills, pain as above and in left ribs >> encouraged to take pain medications as needed ( patient hesitant )  RESP: no SOB, no cough, no sputum  CVS: no chest pain, no palpitations  GI: no abdominal pain, no nausea, no constipation, no diarrhea reported   : no dysuria, no frequency  Neuro: no headache, no dizziness ( patient denies passing out yesterday)     ==================>> PHYSICAL EXAM <<=================    GEN: A&O X 3 , NAD , comfortable, pleasant, calm in bed, getting carotid duplex   HEENT: NCAT, PERRL, MMM, hearing intact  CVS: S1S2 , regular , No M/R/G appreciated  PULM: CTA B/L,  no W/R/R appreciated  ABD.: soft. non tender, non distended,  bowel sounds present  Extrem: intact pulses , no edema     left foot internally rotated           ( Note written / Date of service 11-19-23 ( This is certified to be the same as "ENTERED" date above ( for billing purposes)))    ==================>> MEDICATIONS <<====================    MEDICATIONS  (STANDING):  heparin   Injectable 5000 Unit(s) SubCutaneous every 8 hours    MEDICATIONS  (PRN):  acetaminophen     Tablet .. 650 milliGRAM(s) Oral every 6 hours PRN Temp greater or equal to 38C (100.4F), Mild Pain (1 - 3)  melatonin 3 milliGRAM(s) Oral at bedtime PRN Insomnia  ondansetron Injectable 4 milliGRAM(s) IV Push every 8 hours PRN Nausea and/or Vomiting  oxyCODONE    IR 2.5 milliGRAM(s) Oral every 6 hours PRN Moderate Pain (4 - 6)  oxyCODONE    IR 5 milliGRAM(s) Oral every 6 hours PRN Severe Pain (7 - 10)    ___________  Active diet:  Diet, NPO:   NPO for Procedure/Test     NPO Start Date: 19-Nov-2023,   NPO Start Time: 23:59  Except Medications  Diet, Regular:   Lacto-Ovo Veg (Accepts Milk Prod., Eggs)  ___________________    ==================>> VITAL SIGNS <<==================    T(C): 36.8 (11-19-23 @ 05:40), Max: 37.3 (11-18-23 @ 20:39)  HR: 52 (11-19-23 @ 05:40) (52 - 68)  BP: 128/55 (11-19-23 @ 05:40) (128/55 - 169/83)  BP(mean): 80 (11-19-23 @ 05:40)  RR: 16 (11-19-23 @ 05:40) (16 - 18)  SpO2: 96% (11-19-23 @ 05:40) (91% - 96%)      ==================>> LAB AND IMAGING <<==================                        13.5   7.59  )-----------( 205      ( 19 Nov 2023 05:23 )             40.4        11-19    139  |  106  |  18  ----------------------------<  105<H>  3.7   |  32<H>  |  0.68    Ca    8.6      19 Nov 2023 05:23  Phos  3.2     11-19  Mg     2.1     11-19    TPro  6.5  /  Alb  3.9  /  TBili  0.4  /  DBili  x   /  AST  26  /  ALT  37  /  AlkPhos  88  11-18    PT/INR - ( 18 Nov 2023 11:10 )   PT: 11.0 sec;   INR: 0.96 ratio    PTT - ( 18 Nov 2023 11:10 )  PTT:27.5 sec               carotid duplex negative preliminarily   Echo pending   ___________________________________________________________________________________  ===============>>  A S S E S S M E N T   A N D   P L A N <<===============  ------------------------------------------------------------------------------------------    · Assessment	  Patient is a 79 y/o F with who ambulates independently with  PMH of HTN and carotid stenosis and PSH of appendectomy presented after a fall. Patient reports that after her usual run in the park she was walking at a slower pace to cool down when she tripped and fell on her left side. Patient is being admitted for further management of left femoral neck fracture.     Problem/Plan - 1:  ·  Problem: Fall.   ·  Plan: P/w left hip and left chest pain. S/p fall.   Xray pelvis - left femoral neck fracture.   CT chest - No evidence of acute thoracic traumatic injury on this noncontrast CT chest.  Tylenol - mild pain.   Oxycodone 2.5 - moderate pain  Oxycodone 5.0 - severe pain.   Ortho consulted -  Left hip hemiarthroplasty on Monday, 11/20/2023.   PT consult after surgery.    Problem/Plan - 2:  ·  Problem: Fracture of femoral neck, left.   ·  Plan: P/w left hip and left chest pain. S/p fall.   Xray pelvis - left femoral neck fracture.  pain management as above  check vitamin D level  will need Bone density scan as outpatient ( discussed with pt)   Ortho consulted -  Left hip hemiarthroplasty on Monday, 11/20/2023.   PT consult after surgery.  Good functional capacity (METS 4), no exertional chest pain or shortness of breath.   F/U echo ( given history of moderate AI) .       >> if echo without significant abnormality, patient cleared for Sx with no further workup   patient will follow up with primary cardiologist / PMD Dr Burton post op for further management       Problem/Plan - 3:  ·  Problem: Hypertension.   ·  Plan: Not on any medication.   Monitor blood pressure.    Problem/Plan - 4:  ·  Problem: H/O carotid stenosis.   ·  Plan: F/U carotid ultrasound.    ( prelim negative)    Problem/Plan - 5:  ·  Problem: Prophylactic measure.   ·  Plan: SCDs.    --------------------------------------------  Case discussed with patient   Education given on findings and plan of care  ___________________________  FRANCESCO Padron D.O.  Pager: [phone]

## 2023-11-19 NOTE — DISCHARGE NOTE PROVIDER - NSDCHHNEEDSERVICE_GEN_ALL_CORE
Observation and assessment/Rehabilitation services/Teaching and training/Wound care and assessment Medication teaching and assessment/Observation and assessment/Rehabilitation services/Teaching and training/Wound care and assessment

## 2023-11-19 NOTE — DISCHARGE NOTE PROVIDER - CARE PROVIDERS DIRECT ADDRESSES
,[email or web address] ,[email or web address] ,[email or web address] ,[email or web address],DirectAddress_Unknown ,[email or web address],DirectAddress_Unknown ,[email or web address],[email or web address]

## 2023-11-19 NOTE — DISCHARGE NOTE PROVIDER - HOSPITAL COURSE
Patient is a 79 y/o F, ambulates independently with PMH of HTN and carotid stenosis and PSH of appendectomy presented after a fall.   Patient reports that after her usual run in the park she was walking at a slower pace to cool down when she tripped and fell on her left side. Patient reports she did not have any lightheadedness, or dizziness before the fall. Patient also denies hitting her head or losing consciousness. Patient reports that after the fall she had sharp pain in her left hip and left chest. Patient reports after the fall some passerby tried to help her get up but she was not able to stand back up.     [attending addendum: discussed with pt's PMD / cardiologist, Dr [place], patient with history of a carotid plaque Aortic insufficiency ( moderate) and possible CVA/TIA ( in [place])) but has not had any recent visit or cardiovascular workup.. >> Echo and carotic ordered)].  Her pelvis X-ray showed a left femoral neck fracture and CT chest - No evidence of acute thoracic traumatic injury.      Patient is being admitted for further management of left femoral neck fracture and ortho consulted with plan for Left hip hemiarthroplasty on Monday, 11/20/2023.     PT consult after surgery- INCOMPLETE::::::::11/20/23------  Pending Carotid Doppler-      Please note that this a brief summary of hospital course, please refer to daily progress notes and consult notes for full course and events.        Patient is a 77 y/o F, ambulates independently with PMH of HTN and carotid stenosis and PSH of appendectomy presented after a fall.   Patient reports that after her usual run in the park she was walking at a slower pace to cool down when she tripped and fell on her left side. Patient reports she did not have any lightheadedness, or dizziness before the fall. Patient also denies hitting her head or losing consciousness. Patient reports that after the fall she had sharp pain in her left hip and left chest. Patient reports after the fall some passerby tried to help her get up but she was not able to stand back up.     [attending addendum: discussed with pt's PMD / cardiologist, Dr [place], patient with history of a carotid plaque Aortic insufficiency ( moderate) and possible CVA/TIA ( in [place])) but has not had any recent visit or cardiovascular workup.. >> Echo and carotic ordered)].  Her pelvis X-ray showed a left femoral neck fracture and CT chest - No evidence of acute thoracic traumatic injury.      Patient is being admitted for further management of left femoral neck fracture and ortho consulted with plan for Left hip hemiarthroplasty on Monday, 11/20/2023.     PT consult after surgery- INCOMPLETE::::::::11/20/23------  Pending Carotid Doppler-      Please note that this a brief summary of hospital course, please refer to daily progress notes and consult notes for full course and events.        Patient is a 79 y/o F, ambulates independently with PMH of HTN and carotid stenosis and PSH of appendectomy presented after a fall.   Patient reports that after her usual run in the park she was walking at a slower pace to cool down when she tripped and fell on her left side. Patient reports she did not have any lightheadedness, or dizziness before the fall. Patient also denies hitting her head or losing consciousness. Patient reports that after the fall she had sharp pain in her left hip and left chest. Patient reports after the fall some passerby tried to help her get up but she was not able to stand back up.     [attending addendum: discussed with pt's PMD / cardiologist, Dr [place], patient with history of a carotid plaque Aortic insufficiency ( moderate) and possible CVA/TIA ( in [place])) but has not had any recent visit or cardiovascular workup.. >> Echo and carotic ordered)].  Her pelvis X-ray showed a left femoral neck fracture and CT chest - No evidence of acute thoracic traumatic injury.      Patient is being admitted for further management of left femoral neck fracture and ortho consulted with plan for Left hip hemiarthroplasty on Monday, 11/20/2023.     PT consult after surgery- INCOMPLETE::::::::11/20/23------  Pending Carotid Doppler-      Please note that this a brief summary of hospital course, please refer to daily progress notes and consult notes for full course and events.        Patient is a 79 y/o F, ambulates independently with PMH of HTN and carotid stenosis and Past Surgical history of appendectomy presented after a fall.   Patient reports that after her usual run in the park she was walking at a slower pace to cool down when she tripped and fell on her left side. Patient reports she did not have any lightheadedness, or dizziness before the fall. Patient also denies hitting her head or losing consciousness. Patient reports that after the fall she had sharp pain in her left hip and left chest. Patient reports after the fall some passerby tried to help her get up but she was not able to stand back up.     [attending addendum: discussed with pt's PMD / cardiologist, Dr [place], patient with history of a carotid plaque Aortic insufficiency (moderate) and possible CVA/TIA (in [place]) but has not had any recent visit or cardiovascular workup.. >> Echo and carotic ordered)].  Her pelvis X-ray showed a left femoral neck fracture and CT chest - No evidence of acute thoracic traumatic injury.      Patient admitted for further management of left femoral neck fracture and ortho consulted for Left hip hemiarthroplasty performed on 11/20/2023.     PT consult after surgery with reccs for Sub-acute rehab vs Home with PT  Carotid Doppler negative  for stenosis      Please note that this a brief summary of hospital course, please refer to daily progress notes and consult notes for full course and events.        Patient is a 77 y/o F, ambulates independently with PMH of HTN and carotid stenosis and Past Surgical history of appendectomy presented after a fall.   Patient reports that after her usual run in the park she was walking at a slower pace to cool down when she tripped and fell on her left side. Patient reports she did not have any lightheadedness, or dizziness before the fall. Patient also denies hitting her head or losing consciousness. Patient reports that after the fall she had sharp pain in her left hip and left chest. Patient reports after the fall some passerby tried to help her get up but she was not able to stand back up.     [attending addendum: discussed with pt's PMD / cardiologist, Dr [place], patient with history of a carotid plaque Aortic insufficiency (moderate) and possible CVA/TIA (in [place]) but has not had any recent visit or cardiovascular workup.. >> Echo and carotic ordered)].  Her pelvis X-ray showed a left femoral neck fracture and CT chest - No evidence of acute thoracic traumatic injury.      Patient admitted for further management of left femoral neck fracture and ortho consulted for Left hip hemiarthroplasty performed on 11/20/2023.     PT consult after surgery with reccs for Sub-acute rehab vs Home with PT  Carotid Doppler negative  for stenosis      Please note that this a brief summary of hospital course, please refer to daily progress notes and consult notes for full course and events.

## 2023-11-19 NOTE — DISCHARGE NOTE PROVIDER - NPI NUMBER (FOR SYS[employer] USE ONLY) :
[[id number]] [[id number]] [[id number]] [[id number]],[[id number]] [[id number]],[[id number]] [[id number]],[[id number]]

## 2023-11-19 NOTE — DISCHARGE NOTE PROVIDER - PROVIDER TOKENS
PROVIDER:[TOKEN:[[id number]],FOLLOWUP:[1 week]] PROVIDER:[TOKEN:[[id number]],FOLLOWUP:[1 week]],PROVIDER:[TOKEN:[[id number]],FOLLOWUP:[2 weeks]]

## 2023-11-19 NOTE — PROGRESS NOTE ADULT - SUBJECTIVE AND OBJECTIVE BOX
Orthopedic Consult  CAROL ROY [id number]  78yFemale      Diagnosis:  78yFemale with Left Right Hip Femoral Neck Fracture    HPI:  Patient seen and evaluated at bedside. Patient with no acute complaints  Patient with mild pain to left hip  Pt denies Chest pain, SOB, dyspnea, paresthesias, N/V/D, abdominal pain, syncope, or pain anywhere else.     PAST MEDICAL & SURGICAL HISTORY:      Social History:  Patient denies smoking, alcohol use, or illicit drug use. (18 Nov 2023 14:16)      Allergies    No Known Allergies    Intolerances        Vital Signs Last 24 Hrs  T(C): 36.8 (19 Nov 2023 05:40), Max: 37.3 (18 Nov 2023 20:39)  T(F): 98.3 (19 Nov 2023 05:40), Max: 99.1 (18 Nov 2023 20:39)  HR: 52 (19 Nov 2023 05:40) (52 - 68)  BP: 128/55 (19 Nov 2023 05:40) (128/55 - 169/83)  BP(mean): 80 (19 Nov 2023 05:40) (80 - 80)  RR: 16 (19 Nov 2023 05:40) (16 - 18)  SpO2: 96% (19 Nov 2023 05:40) (91% - 96%)    Parameters below as of 19 Nov 2023 05:40  Patient On (Oxygen Delivery Method): room air      I&O's Detail      Physical Exam:  General: AAOx3, NAD, resting in bed  Left Hip:  Skin is pink and warm. Tender at the fracture site. Decreased ROM of the affected hip due to pain. SILT. NVI. Positive logroll test. Positive heel strike.  Lower extremity:  No calf tenderness, calves are soft. 2+pulses. NVI. 5/5 Strength of FHL/EHL/ADF/APF b/l.  Good capillary refill. Warm, well-perfused.                           13.5   7.59  )-----------( 205      ( 19 Nov 2023 05:23 )             40.4     11-19    139  |  106  |  18  ----------------------------<  105<H>  3.7   |  32<H>  |  0.68    Ca    8.6      19 Nov 2023 05:23  Phos  3.2     11-19  Mg     2.1     11-19    TPro  6.5  /  Alb  3.9  /  TBili  0.4  /  DBili  x   /  AST  26  /  ALT  37  /  AlkPhos  88  11-18    PT/INR - ( 18 Nov 2023 11:10 )   PT: 11.0 sec;   INR: 0.96 ratio         PTT - ( 18 Nov 2023 11:10 )  PTT:27.5 sec  Urinalysis Basic - ( 19 Nov 2023 05:23 )    Color: x / Appearance: x / SG: x / pH: x  Gluc: 105 mg/dL / Ketone: x  / Bili: x / Urobili: x   Blood: x / Protein: x / Nitrite: x   Leuk Esterase: x / RBC: x / WBC x   Sq Epi: x / Non Sq Epi: x / Bacteria: x        Imaging:    Impression:  78yFemale with Left Hip Femoral Neck Fracture    Plan:  -  Pain management  -  DVT prophylaxis with heparin and Venodynes   > Hold all anticoagulation tomorrow for surgery  -  Keep NPO after midnight tonight for surgery  -  Fracture care with bedrest now, NWB of the affected extremity  -  Surgeon:  Dr. Espinoza  -  Procedure:  Left hip hemiarthroplasty  -  Medical Optimization/clearance noted  -  Chlorhexadine wipe and Povidone Iodine nasal swabs ordered   -  Consent: Pending  -  Case d/w Dr. Espnioza   Orthopedic Consult  CAROL ROY [id number]  78yFemale      Diagnosis:  78yFemale with Left Right Hip Femoral Neck Fracture    HPI:  Patient seen and evaluated at bedside. Patient with no acute complaints  Patient with mild pain to left hip  Pt denies Chest pain, SOB, dyspnea, paresthesias, N/V/D, abdominal pain, syncope, or pain anywhere else.     PAST MEDICAL & SURGICAL HISTORY:      Social History:  Patient denies smoking, alcohol use, or illicit drug use. (18 Nov 2023 14:16)      Allergies    No Known Allergies    Intolerances        Vital Signs Last 24 Hrs  T(C): 36.8 (19 Nov 2023 05:40), Max: 37.3 (18 Nov 2023 20:39)  T(F): 98.3 (19 Nov 2023 05:40), Max: 99.1 (18 Nov 2023 20:39)  HR: 52 (19 Nov 2023 05:40) (52 - 68)  BP: 128/55 (19 Nov 2023 05:40) (128/55 - 169/83)  BP(mean): 80 (19 Nov 2023 05:40) (80 - 80)  RR: 16 (19 Nov 2023 05:40) (16 - 18)  SpO2: 96% (19 Nov 2023 05:40) (91% - 96%)    Parameters below as of 19 Nov 2023 05:40  Patient On (Oxygen Delivery Method): room air      I&O's Detail      Physical Exam:  General: AAOx3, NAD, resting in bed  Left Hip:  Skin is pink and warm. Tender at the fracture site. Decreased ROM of the affected hip due to pain. SILT. NVI. Positive logroll test. Positive heel strike.  Lower extremity:  No calf tenderness, calves are soft. 2+pulses. NVI. 5/5 Strength of FHL/EHL/ADF/APF b/l.  Good capillary refill. Warm, well-perfused.                           13.5   7.59  )-----------( 205      ( 19 Nov 2023 05:23 )             40.4     11-19    139  |  106  |  18  ----------------------------<  105<H>  3.7   |  32<H>  |  0.68    Ca    8.6      19 Nov 2023 05:23  Phos  3.2     11-19  Mg     2.1     11-19    TPro  6.5  /  Alb  3.9  /  TBili  0.4  /  DBili  x   /  AST  26  /  ALT  37  /  AlkPhos  88  11-18    PT/INR - ( 18 Nov 2023 11:10 )   PT: 11.0 sec;   INR: 0.96 ratio         PTT - ( 18 Nov 2023 11:10 )  PTT:27.5 sec  Urinalysis Basic - ( 19 Nov 2023 05:23 )    Color: x / Appearance: x / SG: x / pH: x  Gluc: 105 mg/dL / Ketone: x  / Bili: x / Urobili: x   Blood: x / Protein: x / Nitrite: x   Leuk Esterase: x / RBC: x / WBC x   Sq Epi: x / Non Sq Epi: x / Bacteria: x        Imaging:    Impression:  78yFemale with Left Hip Femoral Neck Fracture    Plan:  -  Pain management  -  DVT prophylaxis with heparin and Venodynes   > Hold all anticoagulation tomorrow for surgery  -  Keep NPO after midnight tonight for surgery  -  Fracture care with bedrest now, NWB of the affected extremity  -  Surgeon:  Dr. Espinoza  -  Procedure:  Left hip hemiarthroplasty  -  Medical Optimization/clearance noted  -  Chlorhexadine wipe and Povidone Iodine nasal swabs ordered   -  Consent: Pending  -  Case d/w Dr. Espinoza   Orthopedic Consult  CAROL ROY [id number]  78yFemale      Diagnosis:  78yFemale with Left Right Hip Femoral Neck Fracture    HPI:  Patient seen and evaluated at bedside. Patient with no acute complaints  Patient with mild pain to left hip  Pt denies Chest pain, SOB, dyspnea, paresthesias, N/V/D, abdominal pain, syncope, or pain anywhere else.     PAST MEDICAL & SURGICAL HISTORY:      Social History:  Patient denies smoking, alcohol use, or illicit drug use. (18 Nov 2023 14:16)      Allergies    No Known Allergies    Intolerances        Vital Signs Last 24 Hrs  T(C): 36.8 (19 Nov 2023 05:40), Max: 37.3 (18 Nov 2023 20:39)  T(F): 98.3 (19 Nov 2023 05:40), Max: 99.1 (18 Nov 2023 20:39)  HR: 52 (19 Nov 2023 05:40) (52 - 68)  BP: 128/55 (19 Nov 2023 05:40) (128/55 - 169/83)  BP(mean): 80 (19 Nov 2023 05:40) (80 - 80)  RR: 16 (19 Nov 2023 05:40) (16 - 18)  SpO2: 96% (19 Nov 2023 05:40) (91% - 96%)    Parameters below as of 19 Nov 2023 05:40  Patient On (Oxygen Delivery Method): room air      I&O's Detail      Physical Exam:  General: AAOx3, NAD, resting in bed  Left Hip:  Skin is pink and warm. Tender at the fracture site. Decreased ROM of the affected hip due to pain. SILT. NVI. Positive logroll test. Positive heel strike.  Lower extremity:  No calf tenderness, calves are soft. 2+pulses. NVI. 5/5 Strength of FHL/EHL/ADF/APF b/l.  Good capillary refill. Warm, well-perfused.                           13.5   7.59  )-----------( 205      ( 19 Nov 2023 05:23 )             40.4     11-19    139  |  106  |  18  ----------------------------<  105<H>  3.7   |  32<H>  |  0.68    Ca    8.6      19 Nov 2023 05:23  Phos  3.2     11-19  Mg     2.1     11-19    TPro  6.5  /  Alb  3.9  /  TBili  0.4  /  DBili  x   /  AST  26  /  ALT  37  /  AlkPhos  88  11-18    PT/INR - ( 18 Nov 2023 11:10 )   PT: 11.0 sec;   INR: 0.96 ratio         PTT - ( 18 Nov 2023 11:10 )  PTT:27.5 sec  Urinalysis Basic - ( 19 Nov 2023 05:23 )    Color: x / Appearance: x / SG: x / pH: x  Gluc: 105 mg/dL / Ketone: x  / Bili: x / Urobili: x   Blood: x / Protein: x / Nitrite: x   Leuk Esterase: x / RBC: x / WBC x   Sq Epi: x / Non Sq Epi: x / Bacteria: x        Imaging:    Impression:  78yFemale with Left Hip Femoral Neck Fracture    Plan:  -  Pain management  -  DVT prophylaxis with heparin and Venodynes   > Hold all anticoagulation tomorrow for surgery  -  Keep NPO after midnight tonight for surgery  -  Fracture care with bedrest now, NWB of the affected extremity  -  Surgeon:  Dr. Espinoza  -  Procedure:  Left hip hemiarthroplasty  -  Medical Optimization/clearance noted  -  Chlorhexadine wipe and Povidone Iodine nasal swabs ordered   -  Consent: Pending  -  Case d/w Dr. Espinoza

## 2023-11-19 NOTE — DISCHARGE NOTE PROVIDER - NSDCCAREPROVSEEN_GEN_ALL_CORE_FT
38-year-old male without significant past medical history presents with right lower extremity redness and tenderness consistent with cellulitis.  Imaging suggested the presence of a small fluid collection.  General surgery has been consulted and at this point does not feel that there is a drainable collection but will continue to follow.      On admission the patient was started on vancomycin and ceftriaxone, then transition to ceftriaxone and clindamycin. ID consulted as cellulitis remained severe.    Liudmila Padron Vito, Liudmila Espinoza, Héctor Liudmila Bailon  [facility]  [facility]  User ADM  Jaz Cabrera  Ordering Physician  Team [facility] Pain Service      [ Greater than 35 min spent for discharge services.   FRANCESCO Padron ]       ( Note written / Date of service is the same as last day of patient stay  in the hospital ( for billing purposes))) Liudmila Bailon  [facility]  [facility]  User ADM  Jaz Cabrera  Ordering Physician  Team [facility] Pain Service      [ Greater than 35 min spent for discharge services.   FRANCESCO Padron ]       ( Note written / Date of service is the same as last day of patient stay  in the hospital ( for billing purposes))) Liudmila Bailon  [facility]  [facility]  User ADM  Jaz Cabrera  Ordering Physician  [facility]      [ Greater than 35 min spent for discharge services.   FRANCESCO Padron ]       ( Note written / Date of service is the same as last day of patient stay  in the hospital ( for billing purposes)))

## 2023-11-19 NOTE — DISCHARGE NOTE PROVIDER - CARE PROVIDER_API CALL
Héctor Espinoza  Orthopaedic Surgery  [address] [place] [address]  Phone: [phone]  Fax: [phone]  Follow Up Time: 1 week   Héctor Espinoza  Orthopaedic Surgery  [address] [place] [address]  Phone: [phone]  Fax: [phone]  Follow Up Time: 1 week   Héctor Espinoza  Orthopaedic Surgery  [address] [place] [address]  Phone: [phone]  Fax: [phone]  Follow Up Time: 1 week   Héctor Espinoza  Orthopaedic Surgery  [address] [place] [address]  Phone: [phone]  Fax: [phone]  Follow Up Time: 1 week    Anupam [place]  Cardiovascular Disease  [address] [place] [id number]  Phone: [phone]  Fax: [phone]  Follow Up Time: 2 weeks   Héctor Espinoza  Orthopaedic Surgery  [address] [place] [address]  Phone: [phone]  Fax: [phone]  Follow Up Time: 1 week    Anupam [place]  Cardiovascular Disease  [address] [place] [id number]  Phone: [phone]  Fax: [phone]  Follow Up Time: 2 weeks   Héctor Espinoza  Orthopaedic Surgery  [address] [place] [address]  Phone: [phone]  Fax: [phone]  Follow Up Time: 1 week    Anupam [place]  Cardiovascular Disease  [address] [place] [id number]  Phone: [phone]  Fax: [phone]  Follow Up Time: 2 weeks

## 2023-11-19 NOTE — DISCHARGE NOTE PROVIDER - NSDCMRMEDTOKEN_GEN_ALL_CORE_FT
aspirin 81 mg oral capsule: 1 cap(s) orally once a day   aspirin 81 mg oral capsule: 1 cap(s) orally once a day  CeleBREX 200 mg oral capsule: 1 cap(s) orally once a day as needed for  moderate pain MDD: 1  MiraLax oral powder for reconstitution: 17 gram(s) orally once a day (at bedtime) as needed for  constipation MDD: 1  Percocet 5 mg-325 mg oral tablet: 1 tab(s) orally every 6 hours as needed for  severe pain MDD: 4  senna (sennosides) 17 mg oral tablet: 2 tab(s) orally once a day (at bedtime) as needed for  constipation

## 2023-11-20 ENCOUNTER — TRANSCRIPTION ENCOUNTER (OUTPATIENT)
Age: 78
End: 2023-11-20

## 2023-11-20 DIAGNOSIS — M25.552 PAIN IN LEFT HIP: ICD-10-CM

## 2023-11-20 LAB
24R-OH-CALCIDIOL SERPL-MCNC: 72.4 NG/ML — SIGNIFICANT CHANGE UP (ref 30–80)
ANION GAP SERPL CALC-SCNC: 3 MMOL/L — LOW (ref 5–17)
ANION GAP SERPL CALC-SCNC: 5 MMOL/L — SIGNIFICANT CHANGE UP (ref 5–17)
APTT BLD: 28.6 SEC — SIGNIFICANT CHANGE UP (ref 24.5–35.6)
BLD GP AB SCN SERPL QL: SIGNIFICANT CHANGE UP
BUN SERPL-MCNC: 15 MG/DL — SIGNIFICANT CHANGE UP (ref 7–18)
BUN SERPL-MCNC: 19 MG/DL — HIGH (ref 7–18)
CALCIUM SERPL-MCNC: 8.7 MG/DL — SIGNIFICANT CHANGE UP (ref 8.4–10.5)
CALCIUM SERPL-MCNC: 9.5 MG/DL — SIGNIFICANT CHANGE UP (ref 8.4–10.5)
CHLORIDE SERPL-SCNC: 103 MMOL/L — SIGNIFICANT CHANGE UP (ref 96–108)
CHLORIDE SERPL-SCNC: 104 MMOL/L — SIGNIFICANT CHANGE UP (ref 96–108)
CO2 SERPL-SCNC: 28 MMOL/L — SIGNIFICANT CHANGE UP (ref 22–31)
CO2 SERPL-SCNC: 29 MMOL/L — SIGNIFICANT CHANGE UP (ref 22–31)
CREAT SERPL-MCNC: 0.63 MG/DL — SIGNIFICANT CHANGE UP (ref 0.5–1.3)
CREAT SERPL-MCNC: 0.72 MG/DL — SIGNIFICANT CHANGE UP (ref 0.5–1.3)
EGFR: 87 ML/MIN/1.73M2 — SIGNIFICANT CHANGE UP
EGFR: 92 ML/MIN/1.73M2 — SIGNIFICANT CHANGE UP
GLUCOSE SERPL-MCNC: 110 MG/DL — HIGH (ref 70–99)
GLUCOSE SERPL-MCNC: 99 MG/DL — SIGNIFICANT CHANGE UP (ref 70–99)
HCT VFR BLD CALC: 39.6 % — SIGNIFICANT CHANGE UP (ref 34.5–45)
HCT VFR BLD CALC: 40.2 % — SIGNIFICANT CHANGE UP (ref 34.5–45)
HGB BLD-MCNC: 13.4 G/DL — SIGNIFICANT CHANGE UP (ref 11.5–15.5)
INR BLD: 0.98 RATIO — SIGNIFICANT CHANGE UP (ref 0.85–1.18)
MAGNESIUM SERPL-MCNC: 1.9 MG/DL — SIGNIFICANT CHANGE UP (ref 1.6–2.6)
MCHC RBC-ENTMCNC: 32.4 PG — SIGNIFICANT CHANGE UP (ref 27–34)
MCHC RBC-ENTMCNC: 32.8 PG — SIGNIFICANT CHANGE UP (ref 27–34)
MCHC RBC-ENTMCNC: 33.3 GM/DL — SIGNIFICANT CHANGE UP (ref 32–36)
MCHC RBC-ENTMCNC: 33.8 GM/DL — SIGNIFICANT CHANGE UP (ref 32–36)
MCV RBC AUTO: 97.1 FL — SIGNIFICANT CHANGE UP (ref 80–100)
NRBC # BLD: 0 /100 WBCS — SIGNIFICANT CHANGE UP (ref 0–0)
PHOSPHATE SERPL-MCNC: 2.7 MG/DL — SIGNIFICANT CHANGE UP (ref 2.5–4.5)
PLATELET # BLD AUTO: 170 K/UL — SIGNIFICANT CHANGE UP (ref 150–400)
PLATELET # BLD AUTO: 180 K/UL — SIGNIFICANT CHANGE UP (ref 150–400)
POTASSIUM SERPL-MCNC: 3.6 MMOL/L — SIGNIFICANT CHANGE UP (ref 3.5–5.3)
POTASSIUM SERPL-MCNC: 4 MMOL/L — SIGNIFICANT CHANGE UP (ref 3.5–5.3)
POTASSIUM SERPL-SCNC: 3.6 MMOL/L — SIGNIFICANT CHANGE UP (ref 3.5–5.3)
POTASSIUM SERPL-SCNC: 4 MMOL/L — SIGNIFICANT CHANGE UP (ref 3.5–5.3)
PROTHROM AB SERPL-ACNC: 11.2 SEC — SIGNIFICANT CHANGE UP (ref 9.5–13)
RBC # BLD: 4.08 M/UL — SIGNIFICANT CHANGE UP (ref 3.8–5.2)
RBC # BLD: 4.14 M/UL — SIGNIFICANT CHANGE UP (ref 3.8–5.2)
RBC # FLD: 12.6 % — SIGNIFICANT CHANGE UP (ref 10.3–14.5)
RBC # FLD: 12.9 % — SIGNIFICANT CHANGE UP (ref 10.3–14.5)
SODIUM SERPL-SCNC: 135 MMOL/L — SIGNIFICANT CHANGE UP (ref 135–145)
SODIUM SERPL-SCNC: 137 MMOL/L — SIGNIFICANT CHANGE UP (ref 135–145)
WBC # BLD: 7.27 K/UL — SIGNIFICANT CHANGE UP (ref 3.8–10.5)
WBC # BLD: 9.31 K/UL — SIGNIFICANT CHANGE UP (ref 3.8–10.5)
WBC # FLD AUTO: 7.27 K/UL — SIGNIFICANT CHANGE UP (ref 3.8–10.5)
WBC # FLD AUTO: 9.31 K/UL — SIGNIFICANT CHANGE UP (ref 3.8–10.5)

## 2023-11-20 PROCEDURE — 93306 TTE W/DOPPLER COMPLETE: CPT | Mod: 26

## 2023-11-20 PROCEDURE — 73700 CT LOWER EXTREMITY W/O DYE: CPT | Mod: 26,LT

## 2023-11-20 PROCEDURE — 27236 TREAT THIGH FRACTURE: CPT | Mod: AS,LT

## 2023-11-20 PROCEDURE — 99222 1ST HOSP IP/OBS MODERATE 55: CPT

## 2023-11-20 DEVICE — SCREW CANN 6.5X85: Type: IMPLANTABLE DEVICE | Site: LEFT | Status: FUNCTIONAL

## 2023-11-20 DEVICE — IMPLANTABLE DEVICE: Type: IMPLANTABLE DEVICE | Site: LEFT | Status: FUNCTIONAL

## 2023-11-20 DEVICE — K-WIRE STRYKER 3.2MM X 300MM: Type: IMPLANTABLE DEVICE | Site: LEFT | Status: FUNCTIONAL

## 2023-11-20 RX ORDER — ACETAMINOPHEN 500 MG
725 TABLET ORAL ONCE
Refills: 0 | Status: COMPLETED | OUTPATIENT
Start: 2023-11-21 | End: 2023-11-21

## 2023-11-20 RX ORDER — OXYCODONE HYDROCHLORIDE 5 MG/1
5 TABLET ORAL EVERY 4 HOURS
Refills: 0 | Status: DISCONTINUED | OUTPATIENT
Start: 2023-11-20 | End: 2023-11-20

## 2023-11-20 RX ORDER — ACETAMINOPHEN 500 MG
650 TABLET ORAL EVERY 6 HOURS
Refills: 0 | Status: DISCONTINUED | OUTPATIENT
Start: 2023-11-20 | End: 2023-11-21

## 2023-11-20 RX ORDER — SENNA PLUS 8.6 MG/1
2 TABLET ORAL AT BEDTIME
Refills: 0 | Status: DISCONTINUED | OUTPATIENT
Start: 2023-11-20 | End: 2023-11-22

## 2023-11-20 RX ORDER — OXYCODONE HYDROCHLORIDE 5 MG/1
5 TABLET ORAL EVERY 6 HOURS
Refills: 0 | Status: DISCONTINUED | OUTPATIENT
Start: 2023-11-20 | End: 2023-11-21

## 2023-11-20 RX ORDER — ENOXAPARIN SODIUM 100 MG/ML
30 INJECTION SUBCUTANEOUS EVERY 12 HOURS
Refills: 0 | Status: DISCONTINUED | OUTPATIENT
Start: 2023-11-21 | End: 2023-11-22

## 2023-11-20 RX ORDER — MAGNESIUM HYDROXIDE 400 MG/1
30 TABLET, CHEWABLE ORAL DAILY
Refills: 0 | Status: DISCONTINUED | OUTPATIENT
Start: 2023-11-20 | End: 2023-11-22

## 2023-11-20 RX ORDER — POLYETHYLENE GLYCOL 3350 17 G/17G
17 POWDER, FOR SOLUTION ORAL DAILY
Refills: 0 | Status: DISCONTINUED | OUTPATIENT
Start: 2023-11-20 | End: 2023-11-22

## 2023-11-20 RX ORDER — SODIUM CHLORIDE 9 MG/ML
1000 INJECTION, SOLUTION INTRAVENOUS
Refills: 0 | Status: DISCONTINUED | OUTPATIENT
Start: 2023-11-20 | End: 2023-11-20

## 2023-11-20 RX ORDER — ACETAMINOPHEN 500 MG
725 TABLET ORAL ONCE
Refills: 0 | Status: DISCONTINUED | OUTPATIENT
Start: 2023-11-20 | End: 2023-11-20

## 2023-11-20 RX ORDER — CEFAZOLIN SODIUM 1 G
2000 VIAL (EA) INJECTION EVERY 8 HOURS
Refills: 0 | Status: COMPLETED | OUTPATIENT
Start: 2023-11-21 | End: 2023-11-21

## 2023-11-20 RX ORDER — SENNA PLUS 8.6 MG/1
2 TABLET ORAL AT BEDTIME
Refills: 0 | Status: DISCONTINUED | OUTPATIENT
Start: 2023-11-20 | End: 2023-11-20

## 2023-11-20 RX ORDER — OXYCODONE HYDROCHLORIDE 5 MG/1
2.5 TABLET ORAL EVERY 4 HOURS
Refills: 0 | Status: DISCONTINUED | OUTPATIENT
Start: 2023-11-20 | End: 2023-11-20

## 2023-11-20 RX ORDER — ACETAMINOPHEN 500 MG
725 TABLET ORAL ONCE
Refills: 0 | Status: COMPLETED | OUTPATIENT
Start: 2023-11-20 | End: 2023-11-20

## 2023-11-20 RX ORDER — FENTANYL CITRATE 50 UG/ML
50 INJECTION INTRAVENOUS
Refills: 0 | Status: DISCONTINUED | OUTPATIENT
Start: 2023-11-20 | End: 2023-11-20

## 2023-11-20 RX ORDER — POLYETHYLENE GLYCOL 3350 17 G/17G
17 POWDER, FOR SOLUTION ORAL DAILY
Refills: 0 | Status: DISCONTINUED | OUTPATIENT
Start: 2023-11-20 | End: 2023-11-20

## 2023-11-20 RX ORDER — FENTANYL CITRATE 50 UG/ML
25 INJECTION INTRAVENOUS
Refills: 0 | Status: DISCONTINUED | OUTPATIENT
Start: 2023-11-20 | End: 2023-11-20

## 2023-11-20 RX ORDER — OXYCODONE HYDROCHLORIDE 5 MG/1
2.5 TABLET ORAL EVERY 4 HOURS
Refills: 0 | Status: DISCONTINUED | OUTPATIENT
Start: 2023-11-20 | End: 2023-11-22

## 2023-11-20 RX ADMIN — OXYCODONE HYDROCHLORIDE 2.5 MILLIGRAM(S): 5 TABLET ORAL at 08:00

## 2023-11-20 RX ADMIN — Medication 290 MILLIGRAM(S): at 14:26

## 2023-11-20 RX ADMIN — Medication 725 MILLIGRAM(S): at 14:58

## 2023-11-20 RX ADMIN — CHLORHEXIDINE GLUCONATE 1 APPLICATION(S): 213 SOLUTION TOPICAL at 06:00

## 2023-11-20 RX ADMIN — OXYCODONE HYDROCHLORIDE 2.5 MILLIGRAM(S): 5 TABLET ORAL at 06:54

## 2023-11-20 NOTE — PROGRESS NOTE ADULT - ASSESSMENT
_________________________________________________________________________________________  ========>>  M E D I C A L   A T T E N D I N G    F O L L O W  U P  N O T E  <<=========  -----------------------------------------------------------------------------------------------------    - Patient seen and examined by me earlier today.   - In summary,  CAROL ROY is a 78y year old woman admitted with fall, left hip fracture   - Patient today overall doing ok, comfortable, eating OK. + pain in hip     ==================>> REVIEW OF SYSTEM <<=================    GEN: no fever, no chills, pain as above and in left ribs >> encouraged to take pain medications as needed ( patient hesitant )  RESP: no SOB, no cough, no sputum  CVS: no chest pain, no palpitations  GI: no abdominal pain, no nausea, no constipation, no diarrhea reported   : no dysuria, no frequency  Neuro: no headache, no dizziness ( patient denies passing out yesterday)     ==================>> PHYSICAL EXAM <<=================    GEN: A&O X 3 , NAD , comfortable, pleasant, calm in bed, getting carotid duplex   HEENT: NCAT, PERRL, MMM, hearing intact  CVS: S1S2 , regular , No M/R/G appreciated  PULM: CTA B/L,  no W/R/R appreciated  ABD.: soft. non tender, non distended,  bowel sounds present  Extrem: intact pulses , no edema     left foot internally rotated          ( Note written / Date of service 11-20-23 ( This is certified to be the same as "ENTERED" date above ( for billing purposes)))    ==================>> MEDICATIONS <<====================    acetaminophen   IVPB .. 725 milliGRAM(s) IV Intermittent once  polyethylene glycol 3350 17 Gram(s) Oral daily  povidone iodine 10% Solution 1 Application(s) Topical once  senna 2 Tablet(s) Oral at bedtime    MEDICATIONS  (PRN):  acetaminophen     Tablet .. 650 milliGRAM(s) Oral every 6 hours PRN Temp greater or equal to 38C (100.4F), Mild Pain (1 - 3)  melatonin 3 milliGRAM(s) Oral at bedtime PRN Insomnia  ondansetron Injectable 4 milliGRAM(s) IV Push every 8 hours PRN Nausea and/or Vomiting  oxyCODONE    IR 5 milliGRAM(s) Oral every 4 hours PRN Severe Pain (7 - 10)  oxyCODONE    IR 2.5 milliGRAM(s) Oral every 4 hours PRN Moderate Pain (4 - 6)    ___________  Active diet:  Diet, NPO after Midnight:      NPO Start Date: 19-Nov-2023,   NPO Start Time: 23:59  Except Medications  Diet, Regular:   Lacto-Ovo Veg (Accepts Milk Prod., Eggs)  ___________________    ==================>> VITAL SIGNS <<==================  Height (cm): 157.5  Weight (kg): 47.6  BMI (kg/m2): 19.2  Vital Signs Last 24 HrsT(C): 36.6 (11-20-23 @ 14:11)  T(F): 97.9 (11-20-23 @ 14:11), Max: 98.6 (11-20-23 @ 05:40)  HR: 64 (11-20-23 @ 14:11) (48 - 64)  BP: 153/75 (11-20-23 @ 14:11)  RR: 18 (11-20-23 @ 14:11) (17 - 18)  SpO2: 97% (11-20-23 @ 14:11) (95% - 98%)      CAPILLARY BLOOD GLUCOSE         ==================>> LAB AND IMAGING <<==================                        13.4   7.27  )-----------( 180      ( 20 Nov 2023 04:58 )             40.2        11-20    135  |  103  |  15  ----------------------------<  99  3.6   |  29  |  0.72    Ca    9.5      20 Nov 2023 04:58  Phos  2.7     11-20  Mg     1.9     11-20      WBC count:   7.27 <<== ,  7.59 <<== ,  7.15 <<==   Hemoglobin:   13.4 <<==,  13.5 <<==,  13.8 <<==  platelets:  180 <==, 205 <==, 218 <==    Creatinine:  0.72  <<==, 0.68  <<==, 0.78  <<==  Sodium:   135  <==, 139  <==, 138  <==    < from: Transthoracic Echocardiogram (11.20.23 @ 08:58) >  CONCLUSIONS:  1. Normal mitral valve. Chordal systolic anterior motion.  Trace mitral regurgitation.  2. Aortic valve not well visualized, cannot rule out  bicuspid aortic valve.  No aortic stenosis. Mild to  moderate eccentric aortic regurgitation jet.  3. Asymmetric thickening of the basal septum, max  thickening 1.4cm. Cardiac MRI can be considered for further  evaluation as clinically indicated.  4. Hyperdynamic left ventricular systolic function (EF  >70%).  5. Normal right ventricular size and systolic function  (TAPSE 3.8 cm).  6. Normal tricuspid valve. Mild tricuspid regurgitation.  7. Spherical hypoechoic area is incidentally noted in the  liver, probably representing hepatic cyst. Consider  dedicated hepatic ultrasound for further evaluation if  clinically indicated.    < end of copied text >         carotid duplex negative preliminarily   Echo pending   ___________________________________________________________________________________  ===============>>  A S S E S S M E N T   A N D   P L A N <<===============  ------------------------------------------------------------------------------------------    · Assessment	  Patient is a 77 y/o F with who ambulates independently with  PMH of HTN and carotid stenosis and PSH of appendectomy presented after a fall. Patient reports that after her usual run in the park she was walking at a slower pace to cool down when she tripped and fell on her left side. Patient is being admitted for further management of left femoral neck fracture.     Problem/Plan - 1:  ·  Problem: Fall.   ·  Plan: P/w left hip and left chest pain. S/p fall.   Xray pelvis - left femoral neck fracture.   CT chest - No evidence of acute thoracic traumatic injury on this noncontrast CT chest.  Tylenol - mild pain.   Oxycodone 2.5 - moderate pain  Oxycodone 5.0 - severe pain.   Ortho consulted -  Left hip hemiarthroplasty on Monday, 11/20/2023.   PT consult after surgery.    Problem/Plan - 2:  ·  Problem: Fracture of femoral neck, left.   ·  Plan: P/w left hip and left chest pain. S/p fall.   Xray pelvis - left femoral neck fracture.  pain management as above  check vitamin D level  will need Bone density scan as outpatient ( discussed with pt)   Ortho consulted -  Left hip hemiarthroplasty on Monday, 11/20/2023.   PT consult after surgery.  Good functional capacity (METS 4), no exertional chest pain or shortness of breath.   F/U echo ( given history of moderate AI) .       >> if echo without significant abnormality, patient cleared for Sx with no further workup   patient will follow up with primary cardiologist / PMD Dr Burton post op for further management       Problem/Plan - 3:  ·  Problem: Hypertension.   ·  Plan: Not on any medication.   Monitor blood pressure.    Problem/Plan - 4:  ·  Problem: H/O carotid stenosis.   ·  Plan: F/U carotid ultrasound.    ( prelim negative)    Problem/Plan - 5:  ·  Problem: Prophylactic measure.   ·  Plan: SCDs.    --------------------------------------------  Case discussed with patient   Education given on findings and plan of care  ___________________________  H. IFEOMA Padron.  Pager: [phone]       _________________________________________________________________________________________  ========>>  M E D I C A L   A T T E N D I N G    F O L L O W  U P  N O T E  <<=========  -----------------------------------------------------------------------------------------------------    - Patient seen and examined by me earlier today.   - In summary,  CAROL ROY is a 78y year old woman admitted with fall, left hip fracture   - Patient today overall doing ok, comfortable, eating OK. + pain in hip     ==================>> REVIEW OF SYSTEM <<=================    GEN: no fever, no chills, pain as above and in left ribs >> encouraged to take pain medications as needed ( patient hesitant )  RESP: no SOB, no cough, no sputum  CVS: no chest pain, no palpitations  GI: no abdominal pain, no nausea, no constipation, no diarrhea reported   : no dysuria, no frequency  Neuro: no headache, no dizziness ( patient denies passing out yesterday)     ==================>> PHYSICAL EXAM <<=================    GEN: A&O X 3 , NAD , comfortable, pleasant, calm in bed, getting carotid duplex   HEENT: NCAT, PERRL, MMM, hearing intact  CVS: S1S2 , regular , No M/R/G appreciated  PULM: CTA B/L,  no W/R/R appreciated  ABD.: soft. non tender, non distended,  bowel sounds present  Extrem: intact pulses , no edema     left foot internally rotated          ( Note written / Date of service 11-20-23 ( This is certified to be the same as "ENTERED" date above ( for billing purposes)))    ==================>> MEDICATIONS <<====================    acetaminophen   IVPB .. 725 milliGRAM(s) IV Intermittent once  polyethylene glycol 3350 17 Gram(s) Oral daily  povidone iodine 10% Solution 1 Application(s) Topical once  senna 2 Tablet(s) Oral at bedtime    MEDICATIONS  (PRN):  acetaminophen     Tablet .. 650 milliGRAM(s) Oral every 6 hours PRN Temp greater or equal to 38C (100.4F), Mild Pain (1 - 3)  melatonin 3 milliGRAM(s) Oral at bedtime PRN Insomnia  ondansetron Injectable 4 milliGRAM(s) IV Push every 8 hours PRN Nausea and/or Vomiting  oxyCODONE    IR 5 milliGRAM(s) Oral every 4 hours PRN Severe Pain (7 - 10)  oxyCODONE    IR 2.5 milliGRAM(s) Oral every 4 hours PRN Moderate Pain (4 - 6)    ___________  Active diet:  Diet, NPO after Midnight:      NPO Start Date: 19-Nov-2023,   NPO Start Time: 23:59  Except Medications  Diet, Regular:   Lacto-Ovo Veg (Accepts Milk Prod., Eggs)  ___________________    ==================>> VITAL SIGNS <<==================  Height (cm): 157.5  Weight (kg): 47.6  BMI (kg/m2): 19.2  Vital Signs Last 24 HrsT(C): 36.6 (11-20-23 @ 14:11)  T(F): 97.9 (11-20-23 @ 14:11), Max: 98.6 (11-20-23 @ 05:40)  HR: 64 (11-20-23 @ 14:11) (48 - 64)  BP: 153/75 (11-20-23 @ 14:11)  RR: 18 (11-20-23 @ 14:11) (17 - 18)  SpO2: 97% (11-20-23 @ 14:11) (95% - 98%)      CAPILLARY BLOOD GLUCOSE         ==================>> LAB AND IMAGING <<==================                        13.4   7.27  )-----------( 180      ( 20 Nov 2023 04:58 )             40.2        11-20    135  |  103  |  15  ----------------------------<  99  3.6   |  29  |  0.72    Ca    9.5      20 Nov 2023 04:58  Phos  2.7     11-20  Mg     1.9     11-20      WBC count:   7.27 <<== ,  7.59 <<== ,  7.15 <<==   Hemoglobin:   13.4 <<==,  13.5 <<==,  13.8 <<==  platelets:  180 <==, 205 <==, 218 <==    Creatinine:  0.72  <<==, 0.68  <<==, 0.78  <<==  Sodium:   135  <==, 139  <==, 138  <==    < from: Transthoracic Echocardiogram (11.20.23 @ 08:58) >  CONCLUSIONS:  1. Normal mitral valve. Chordal systolic anterior motion.  Trace mitral regurgitation.  2. Aortic valve not well visualized, cannot rule out  bicuspid aortic valve.  No aortic stenosis. Mild to  moderate eccentric aortic regurgitation jet.  3. Asymmetric thickening of the basal septum, max  thickening 1.4cm. Cardiac MRI can be considered for further  evaluation as clinically indicated.  4. Hyperdynamic left ventricular systolic function (EF  >70%).  5. Normal right ventricular size and systolic function  (TAPSE 3.8 cm).  6. Normal tricuspid valve. Mild tricuspid regurgitation.  7. Spherical hypoechoic area is incidentally noted in the  liver, probably representing hepatic cyst. Consider  dedicated hepatic ultrasound for further evaluation if  clinically indicated.    < end of copied text >         carotid duplex negative preliminarily   Echo pending   ___________________________________________________________________________________  ===============>>  A S S E S S M E N T   A N D   P L A N <<===============  ------------------------------------------------------------------------------------------    · Assessment	  Patient is a 79 y/o F with who ambulates independently with  PMH of HTN and carotid stenosis and PSH of appendectomy presented after a fall. Patient reports that after her usual run in the park she was walking at a slower pace to cool down when she tripped and fell on her left side. Patient is being admitted for further management of left femoral neck fracture.     Problem/Plan - 1:  ·  Problem: Fall.   ·  Plan: P/w left hip and left chest pain. S/p fall.   Xray pelvis - left femoral neck fracture.   CT chest - No evidence of acute thoracic traumatic injury on this noncontrast CT chest.  Tylenol - mild pain.   Oxycodone 2.5 - moderate pain  Oxycodone 5.0 - severe pain.   Ortho consulted -  Left hip hemiarthroplasty on Monday, 11/20/2023.   PT consult after surgery.    Problem/Plan - 2:  ·  Problem: Fracture of femoral neck, left.   ·  Plan: P/w left hip and left chest pain. S/p fall.   Xray pelvis - left femoral neck fracture.  pain management as above  check vitamin D level  will need Bone density scan as outpatient ( discussed with pt)   Ortho consulted -  Left hip hemiarthroplasty on Monday, 11/20/2023.   PT consult after surgery.  Good functional capacity (METS 4), no exertional chest pain or shortness of breath.   F/U echo ( given history of moderate AI) .       >> if echo without significant abnormality, patient cleared for Sx with no further workup   patient will follow up with primary cardiologist / PMD Dr Burton post op for further management       Problem/Plan - 3:  ·  Problem: Hypertension.   ·  Plan: Not on any medication.   Monitor blood pressure.    Problem/Plan - 4:  ·  Problem: H/O carotid stenosis.   ·  Plan: F/U carotid ultrasound.    ( prelim negative)    Problem/Plan - 5:  ·  Problem: Prophylactic measure.   ·  Plan: SCDs.    --------------------------------------------  Case discussed with patient   Education given on findings and plan of care  ___________________________  H. IFEOMA Padrno.  Pager: [phone]       _________________________________________________________________________________________  ========>>  M E D I C A L   A T T E N D I N G    F O L L O W  U P  N O T E  <<=========  -----------------------------------------------------------------------------------------------------    - Patient seen and examined by me earlier today.   - In summary,  CAROL ROY is a 78y year old woman admitted with fall, left hip fracture   - Patient today overall doing ok, comfortable, eating OK. + pain in hip     ==================>> REVIEW OF SYSTEM <<=================    GEN: no fever, no chills, pain as above and in left ribs >> encouraged to take pain medications as needed ( patient hesitant )  RESP: no SOB, no cough, no sputum  CVS: no chest pain, no palpitations  GI: no abdominal pain, no nausea, no constipation, no diarrhea reported   : no dysuria, no frequency  Neuro: no headache, no dizziness ( patient denies passing out yesterday)     ==================>> PHYSICAL EXAM <<=================    GEN: A&O X 3 , NAD , comfortable, pleasant, calm in bed, getting carotid duplex   HEENT: NCAT, PERRL, MMM, hearing intact  CVS: S1S2 , regular , No M/R/G appreciated  PULM: CTA B/L,  no W/R/R appreciated  ABD.: soft. non tender, non distended,  bowel sounds present  Extrem: intact pulses , no edema     left foot internally rotated          ( Note written / Date of service 11-20-23 ( This is certified to be the same as "ENTERED" date above ( for billing purposes)))    ==================>> MEDICATIONS <<====================    acetaminophen   IVPB .. 725 milliGRAM(s) IV Intermittent once  polyethylene glycol 3350 17 Gram(s) Oral daily  povidone iodine 10% Solution 1 Application(s) Topical once  senna 2 Tablet(s) Oral at bedtime    MEDICATIONS  (PRN):  acetaminophen     Tablet .. 650 milliGRAM(s) Oral every 6 hours PRN Temp greater or equal to 38C (100.4F), Mild Pain (1 - 3)  melatonin 3 milliGRAM(s) Oral at bedtime PRN Insomnia  ondansetron Injectable 4 milliGRAM(s) IV Push every 8 hours PRN Nausea and/or Vomiting  oxyCODONE    IR 5 milliGRAM(s) Oral every 4 hours PRN Severe Pain (7 - 10)  oxyCODONE    IR 2.5 milliGRAM(s) Oral every 4 hours PRN Moderate Pain (4 - 6)    ___________  Active diet:  Diet, NPO after Midnight:      NPO Start Date: 19-Nov-2023,   NPO Start Time: 23:59  Except Medications  Diet, Regular:   Lacto-Ovo Veg (Accepts Milk Prod., Eggs)  ___________________    ==================>> VITAL SIGNS <<==================  Height (cm): 157.5  Weight (kg): 47.6  BMI (kg/m2): 19.2  Vital Signs Last 24 HrsT(C): 36.6 (11-20-23 @ 14:11)  T(F): 97.9 (11-20-23 @ 14:11), Max: 98.6 (11-20-23 @ 05:40)  HR: 64 (11-20-23 @ 14:11) (48 - 64)  BP: 153/75 (11-20-23 @ 14:11)  RR: 18 (11-20-23 @ 14:11) (17 - 18)  SpO2: 97% (11-20-23 @ 14:11) (95% - 98%)      CAPILLARY BLOOD GLUCOSE         ==================>> LAB AND IMAGING <<==================                        13.4   7.27  )-----------( 180      ( 20 Nov 2023 04:58 )             40.2        11-20    135  |  103  |  15  ----------------------------<  99  3.6   |  29  |  0.72    Ca    9.5      20 Nov 2023 04:58  Phos  2.7     11-20  Mg     1.9     11-20      WBC count:   7.27 <<== ,  7.59 <<== ,  7.15 <<==   Hemoglobin:   13.4 <<==,  13.5 <<==,  13.8 <<==  platelets:  180 <==, 205 <==, 218 <==    Creatinine:  0.72  <<==, 0.68  <<==, 0.78  <<==  Sodium:   135  <==, 139  <==, 138  <==    < from: Transthoracic Echocardiogram (11.20.23 @ 08:58) >  CONCLUSIONS:  1. Normal mitral valve. Chordal systolic anterior motion.  Trace mitral regurgitation.  2. Aortic valve not well visualized, cannot rule out  bicuspid aortic valve.  No aortic stenosis. Mild to  moderate eccentric aortic regurgitation jet.  3. Asymmetric thickening of the basal septum, max  thickening 1.4cm. Cardiac MRI can be considered for further  evaluation as clinically indicated.  4. Hyperdynamic left ventricular systolic function (EF  >70%).  5. Normal right ventricular size and systolic function  (TAPSE 3.8 cm).  6. Normal tricuspid valve. Mild tricuspid regurgitation.  7. Spherical hypoechoic area is incidentally noted in the  liver, probably representing hepatic cyst. Consider  dedicated hepatic ultrasound for further evaluation if  clinically indicated.    < end of copied text >         carotid duplex negative preliminarily   Echo pending   ___________________________________________________________________________________  ===============>>  A S S E S S M E N T   A N D   P L A N <<===============  ------------------------------------------------------------------------------------------    · Assessment	  Patient is a 79 y/o F with who ambulates independently with  PMH of HTN and carotid stenosis and PSH of appendectomy presented after a fall. Patient reports that after her usual run in the park she was walking at a slower pace to cool down when she tripped and fell on her left side. Patient is being admitted for further management of left femoral neck fracture.     Problem/Plan - 1:  ·  Problem: Fall.   ·  Plan: P/w left hip and left chest pain. S/p fall.   Xray pelvis - left femoral neck fracture.   CT chest - No evidence of acute thoracic traumatic injury on this noncontrast CT chest.  Tylenol - mild pain.   Oxycodone 2.5 - moderate pain  Oxycodone 5.0 - severe pain.   Ortho consulted -  Left hip hemiarthroplasty on Monday, 11/20/2023.   PT consult after surgery.    Problem/Plan - 2:  ·  Problem: Fracture of femoral neck, left.   ·  Plan: P/w left hip and left chest pain. S/p fall.   Xray pelvis - left femoral neck fracture.  pain management as above  check vitamin D level  will need Bone density scan as outpatient ( discussed with pt)   Ortho consulted -  Left hip hemiarthroplasty on Monday, 11/20/2023.   PT consult after surgery.  Good functional capacity (METS 4), no exertional chest pain or shortness of breath.   F/U echo ( given history of moderate AI) .       >> if echo without significant abnormality, patient cleared for Sx with no further workup   patient will follow up with primary cardiologist / PMD Dr Burton post op for further management       Problem/Plan - 3:  ·  Problem: Hypertension.   ·  Plan: Not on any medication.   Monitor blood pressure.    Problem/Plan - 4:  ·  Problem: H/O carotid stenosis.   ·  Plan: F/U carotid ultrasound.    ( prelim negative)    Problem/Plan - 5:  ·  Problem: Prophylactic measure.   ·  Plan: SCDs.    --------------------------------------------  Case discussed with patient   Education given on findings and plan of care  ___________________________  H. IFEOMA Padron.  Pager: [phone]       _________________________________________________________________________________________  ========>>  M E D I C A L   A T T E N D I N G    F O L L O W  U P  N O T E  <<=========  -----------------------------------------------------------------------------------------------------    - Patient evaluated by me, chart reviewed.   - In summary,  CAROL ROY is a 78y year old woman admitted with fall, left hip fracture   - Patient today overall doing ok, comfortable, NPO, pain in control    ==================>> REVIEW OF SYSTEM <<=================    GEN: no fever, no chills, pain as above and in left ribs >> encouraged to take pain medications as needed ( patient hesitant )  RESP: no SOB, no cough, no sputum  CVS: no chest pain, no palpitations  GI: no abdominal pain, no nausea, no constipation, no diarrhea reported   : no dysuria, no frequency  Neuro: no headache, no dizziness ( patient denies passing out yesterday)     ==================>> PHYSICAL EXAM <<=================    GEN: A&O X 3 , NAD , comfortable, pleasant, calm in bed, getting carotid duplex   HEENT: NCAT, PERRL, MMM, hearing intact  CVS: S1S2 , regular , No M/R/G appreciated  PULM: CTA B/L,  no W/R/R appreciated  ABD.: soft. non tender, non distended,  bowel sounds present  Extrem: intact pulses , no edema     left foot internally rotated          ( Note written / Date of service 11-20-23 ( This is certified to be the same as "ENTERED" date above ( for billing purposes)))    ==================>> MEDICATIONS <<====================    acetaminophen   IVPB .. 725 milliGRAM(s) IV Intermittent once  polyethylene glycol 3350 17 Gram(s) Oral daily  povidone iodine 10% Solution 1 Application(s) Topical once  senna 2 Tablet(s) Oral at bedtime    MEDICATIONS  (PRN):  acetaminophen     Tablet .. 650 milliGRAM(s) Oral every 6 hours PRN Temp greater or equal to 38C (100.4F), Mild Pain (1 - 3)  melatonin 3 milliGRAM(s) Oral at bedtime PRN Insomnia  ondansetron Injectable 4 milliGRAM(s) IV Push every 8 hours PRN Nausea and/or Vomiting  oxyCODONE    IR 5 milliGRAM(s) Oral every 4 hours PRN Severe Pain (7 - 10)  oxyCODONE    IR 2.5 milliGRAM(s) Oral every 4 hours PRN Moderate Pain (4 - 6)    ___________  Active diet:  Diet, NPO after Midnight:      NPO Start Date: 19-Nov-2023,   NPO Start Time: 23:59  Except Medications  Diet, Regular:   Lacto-Ovo Veg (Accepts Milk Prod., Eggs)  ___________________    ==================>> VITAL SIGNS <<==================  Height (cm): 157.5  Weight (kg): 47.6  BMI (kg/m2): 19.2  Vital Signs Last 24 HrsT(C): 36.6 (11-20-23 @ 14:11)  T(F): 97.9 (11-20-23 @ 14:11), Max: 98.6 (11-20-23 @ 05:40)  HR: 64 (11-20-23 @ 14:11) (48 - 64)  BP: 153/75 (11-20-23 @ 14:11)  RR: 18 (11-20-23 @ 14:11) (17 - 18)  SpO2: 97% (11-20-23 @ 14:11) (95% - 98%)       ==================>> LAB AND IMAGING <<==================                        13.4   7.27  )-----------( 180      ( 20 Nov 2023 04:58 )             40.2        11-20    135  |  103  |  15  ----------------------------<  99  3.6   |  29  |  0.72    Ca    9.5      20 Nov 2023 04:58  Phos  2.7     11-20  Mg     1.9     11-20    WBC count:   7.27 <<== ,  7.59 <<== ,  7.15 <<==   Hemoglobin:   13.4 <<==,  13.5 <<==,  13.8 <<==  platelets:  180 <==, 205 <==, 218 <==    Creatinine:  0.72  <<==, 0.68  <<==, 0.78  <<==  Sodium:   135  <==, 139  <==, 138  <==    < from: Transthoracic Echocardiogram (11.20.23 @ 08:58) >  CONCLUSIONS:  1. Normal mitral valve. Chordal systolic anterior motion.  Trace mitral regurgitation.  2. Aortic valve not well visualized, cannot rule out  bicuspid aortic valve.  No aortic stenosis. Mild to  moderate eccentric aortic regurgitation jet.  3. Asymmetric thickening of the basal septum, max  thickening 1.4cm. Cardiac MRI can be considered for further  evaluation as clinically indicated.  4. Hyperdynamic left ventricular systolic function (EF  >70%).  5. Normal right ventricular size and systolic function  (TAPSE 3.8 cm).  6. Normal tricuspid valve. Mild tricuspid regurgitation.  7. Spherical hypoechoic area is incidentally noted in the  liver, probably representing hepatic cyst. Consider  dedicated hepatic ultrasound for further evaluation if clinically indicated.  < end of copied text >    carotid duplex negative preliminarily     ___________________________________________________________________________________  ===============>>  A S S E S S M E N T   A N D   P L A N <<===============  ------------------------------------------------------------------------------------------    · Assessment	  Patient is a 79 y/o F with who ambulates independently with  PMH of HTN and carotid stenosis and PSH of appendectomy presented after a fall. Patient reports that after her usual run in the park she was walking at a slower pace to cool down when she tripped and fell on her left side. Patient is being admitted for further management of left femoral neck fracture.     Problem/Plan - 1:  ·  Problem: Fall.   ·  Plan: P/w left hip and left chest pain. S/p fall.   Xray pelvis - left femoral neck fracture.   CT chest - No evidence of acute thoracic traumatic injury on this noncontrast CT chest.  Tylenol - mild pain.   Oxycodone 2.5 - moderate pain  Oxycodone 5.0 - severe pain.   Ortho consulted -  Left hip hemiarthroplasty on Monday, 11/20/2023.   PT consult after surgery.    Problem/Plan - 2:  ·  Problem: Fracture of femoral neck, left.   ·  Plan: P/w left hip and left chest pain. S/p fall.   Xray pelvis - left femoral neck fracture.  pain management as above  vitamin D level 72 !  will need Bone density scan as outpatient ( discussed with pt)   Ortho on board >> Sx today  PT consult after surgery.  medically stable for OR     Problem/Plan - 3:  ·  Problem: Hypertension.   ·  Plan: Not on any medication.   Monitor blood pressure.    Problem/Plan - 4:  ·  Problem: Prophylactic measure.   ·  Plan: SCDs.    --------------------------------------------  Case discussed with patient ,  at bedside   Education given on findings and plan of care  ___________________________  H. IFEOMA Padron.  Pager: [phone]       _________________________________________________________________________________________  ========>>  M E D I C A L   A T T E N D I N G    F O L L O W  U P  N O T E  <<=========  -----------------------------------------------------------------------------------------------------    - Patient evaluated by me, chart reviewed.   - In summary,  CAROL ROY is a 78y year old woman admitted with fall, left hip fracture   - Patient today overall doing ok, comfortable, NPO, pain in control    ==================>> REVIEW OF SYSTEM <<=================    GEN: no fever, no chills, pain as above and in left ribs >> encouraged to take pain medications as needed ( patient hesitant )  RESP: no SOB, no cough, no sputum  CVS: no chest pain, no palpitations  GI: no abdominal pain, no nausea, no constipation, no diarrhea reported   : no dysuria, no frequency  Neuro: no headache, no dizziness ( patient denies passing out yesterday)     ==================>> PHYSICAL EXAM <<=================    GEN: A&O X 3 , NAD , comfortable, pleasant, calm in bed, getting carotid duplex   HEENT: NCAT, PERRL, MMM, hearing intact  CVS: S1S2 , regular , No M/R/G appreciated  PULM: CTA B/L,  no W/R/R appreciated  ABD.: soft. non tender, non distended,  bowel sounds present  Extrem: intact pulses , no edema     left foot internally rotated          ( Note written / Date of service 11-20-23 ( This is certified to be the same as "ENTERED" date above ( for billing purposes)))    ==================>> MEDICATIONS <<====================    acetaminophen   IVPB .. 725 milliGRAM(s) IV Intermittent once  polyethylene glycol 3350 17 Gram(s) Oral daily  povidone iodine 10% Solution 1 Application(s) Topical once  senna 2 Tablet(s) Oral at bedtime    MEDICATIONS  (PRN):  acetaminophen     Tablet .. 650 milliGRAM(s) Oral every 6 hours PRN Temp greater or equal to 38C (100.4F), Mild Pain (1 - 3)  melatonin 3 milliGRAM(s) Oral at bedtime PRN Insomnia  ondansetron Injectable 4 milliGRAM(s) IV Push every 8 hours PRN Nausea and/or Vomiting  oxyCODONE    IR 5 milliGRAM(s) Oral every 4 hours PRN Severe Pain (7 - 10)  oxyCODONE    IR 2.5 milliGRAM(s) Oral every 4 hours PRN Moderate Pain (4 - 6)    ___________  Active diet:  Diet, NPO after Midnight:      NPO Start Date: 19-Nov-2023,   NPO Start Time: 23:59  Except Medications  Diet, Regular:   Lacto-Ovo Veg (Accepts Milk Prod., Eggs)  ___________________    ==================>> VITAL SIGNS <<==================  Height (cm): 157.5  Weight (kg): 47.6  BMI (kg/m2): 19.2  Vital Signs Last 24 HrsT(C): 36.6 (11-20-23 @ 14:11)  T(F): 97.9 (11-20-23 @ 14:11), Max: 98.6 (11-20-23 @ 05:40)  HR: 64 (11-20-23 @ 14:11) (48 - 64)  BP: 153/75 (11-20-23 @ 14:11)  RR: 18 (11-20-23 @ 14:11) (17 - 18)  SpO2: 97% (11-20-23 @ 14:11) (95% - 98%)       ==================>> LAB AND IMAGING <<==================                        13.4   7.27  )-----------( 180      ( 20 Nov 2023 04:58 )             40.2        11-20    135  |  103  |  15  ----------------------------<  99  3.6   |  29  |  0.72    Ca    9.5      20 Nov 2023 04:58  Phos  2.7     11-20  Mg     1.9     11-20    WBC count:   7.27 <<== ,  7.59 <<== ,  7.15 <<==   Hemoglobin:   13.4 <<==,  13.5 <<==,  13.8 <<==  platelets:  180 <==, 205 <==, 218 <==    Creatinine:  0.72  <<==, 0.68  <<==, 0.78  <<==  Sodium:   135  <==, 139  <==, 138  <==    < from: Transthoracic Echocardiogram (11.20.23 @ 08:58) >  CONCLUSIONS:  1. Normal mitral valve. Chordal systolic anterior motion.  Trace mitral regurgitation.  2. Aortic valve not well visualized, cannot rule out  bicuspid aortic valve.  No aortic stenosis. Mild to  moderate eccentric aortic regurgitation jet.  3. Asymmetric thickening of the basal septum, max  thickening 1.4cm. Cardiac MRI can be considered for further  evaluation as clinically indicated.  4. Hyperdynamic left ventricular systolic function (EF  >70%).  5. Normal right ventricular size and systolic function  (TAPSE 3.8 cm).  6. Normal tricuspid valve. Mild tricuspid regurgitation.  7. Spherical hypoechoic area is incidentally noted in the  liver, probably representing hepatic cyst. Consider  dedicated hepatic ultrasound for further evaluation if clinically indicated.  < end of copied text >    carotid duplex negative preliminarily     ___________________________________________________________________________________  ===============>>  A S S E S S M E N T   A N D   P L A N <<===============  ------------------------------------------------------------------------------------------    · Assessment	  Patient is a 79 y/o F with who ambulates independently with  PMH of HTN and carotid stenosis and PSH of appendectomy presented after a fall. Patient reports that after her usual run in the park she was walking at a slower pace to cool down when she tripped and fell on her left side. Patient is being admitted for further management of left femoral neck fracture.     Problem/Plan - 1:  ·  Problem: Fall.   ·  Plan: P/w left hip and left chest pain. S/p fall.   Xray pelvis - left femoral neck fracture.   CT chest - No evidence of acute thoracic traumatic injury on this noncontrast CT chest.  Tylenol - mild pain.   Oxycodone 2.5 - moderate pain  Oxycodone 5.0 - severe pain.   Ortho consulted -  Left hip hemiarthroplasty on Monday, 11/20/2023.   PT consult after surgery.    Problem/Plan - 2:  ·  Problem: Fracture of femoral neck, left.   ·  Plan: P/w left hip and left chest pain. S/p fall.   Xray pelvis - left femoral neck fracture.  pain management as above  vitamin D level 72 !  will need Bone density scan as outpatient ( discussed with pt)   Ortho on board >> Sx today  PT consult after surgery.  medically stable for OR     Problem/Plan - 3:  ·  Problem: Hypertension.   ·  Plan: Not on any medication.   Monitor blood pressure.    Problem/Plan - 4:  ·  Problem: Prophylactic measure.   ·  Plan: SCDs.    --------------------------------------------  Case discussed with patient ,  at bedside   Education given on findings and plan of care  ___________________________  H. IFEOMA Padron.  Pager: [phone]       _________________________________________________________________________________________  ========>>  M E D I C A L   A T T E N D I N G    F O L L O W  U P  N O T E  <<=========  -----------------------------------------------------------------------------------------------------    - Patient evaluated by me, chart reviewed.   - In summary,  CAROL ROY is a 78y year old woman admitted with fall, left hip fracture   - Patient today overall doing ok, comfortable, NPO, pain in control    ==================>> REVIEW OF SYSTEM <<=================    GEN: no fever, no chills, pain as above and in left ribs >> encouraged to take pain medications as needed ( patient hesitant )  RESP: no SOB, no cough, no sputum  CVS: no chest pain, no palpitations  GI: no abdominal pain, no nausea, no constipation, no diarrhea reported   : no dysuria, no frequency  Neuro: no headache, no dizziness ( patient denies passing out yesterday)     ==================>> PHYSICAL EXAM <<=================    GEN: A&O X 3 , NAD , comfortable, pleasant, calm in bed, getting carotid duplex   HEENT: NCAT, PERRL, MMM, hearing intact  CVS: S1S2 , regular , No M/R/G appreciated  PULM: CTA B/L,  no W/R/R appreciated  ABD.: soft. non tender, non distended,  bowel sounds present  Extrem: intact pulses , no edema     left foot internally rotated          ( Note written / Date of service 11-20-23 ( This is certified to be the same as "ENTERED" date above ( for billing purposes)))    ==================>> MEDICATIONS <<====================    acetaminophen   IVPB .. 725 milliGRAM(s) IV Intermittent once  polyethylene glycol 3350 17 Gram(s) Oral daily  povidone iodine 10% Solution 1 Application(s) Topical once  senna 2 Tablet(s) Oral at bedtime    MEDICATIONS  (PRN):  acetaminophen     Tablet .. 650 milliGRAM(s) Oral every 6 hours PRN Temp greater or equal to 38C (100.4F), Mild Pain (1 - 3)  melatonin 3 milliGRAM(s) Oral at bedtime PRN Insomnia  ondansetron Injectable 4 milliGRAM(s) IV Push every 8 hours PRN Nausea and/or Vomiting  oxyCODONE    IR 5 milliGRAM(s) Oral every 4 hours PRN Severe Pain (7 - 10)  oxyCODONE    IR 2.5 milliGRAM(s) Oral every 4 hours PRN Moderate Pain (4 - 6)    ___________  Active diet:  Diet, NPO after Midnight:      NPO Start Date: 19-Nov-2023,   NPO Start Time: 23:59  Except Medications  Diet, Regular:   Lacto-Ovo Veg (Accepts Milk Prod., Eggs)  ___________________    ==================>> VITAL SIGNS <<==================  Height (cm): 157.5  Weight (kg): 47.6  BMI (kg/m2): 19.2  Vital Signs Last 24 HrsT(C): 36.6 (11-20-23 @ 14:11)  T(F): 97.9 (11-20-23 @ 14:11), Max: 98.6 (11-20-23 @ 05:40)  HR: 64 (11-20-23 @ 14:11) (48 - 64)  BP: 153/75 (11-20-23 @ 14:11)  RR: 18 (11-20-23 @ 14:11) (17 - 18)  SpO2: 97% (11-20-23 @ 14:11) (95% - 98%)       ==================>> LAB AND IMAGING <<==================                        13.4   7.27  )-----------( 180      ( 20 Nov 2023 04:58 )             40.2        11-20    135  |  103  |  15  ----------------------------<  99  3.6   |  29  |  0.72    Ca    9.5      20 Nov 2023 04:58  Phos  2.7     11-20  Mg     1.9     11-20    WBC count:   7.27 <<== ,  7.59 <<== ,  7.15 <<==   Hemoglobin:   13.4 <<==,  13.5 <<==,  13.8 <<==  platelets:  180 <==, 205 <==, 218 <==    Creatinine:  0.72  <<==, 0.68  <<==, 0.78  <<==  Sodium:   135  <==, 139  <==, 138  <==    < from: Transthoracic Echocardiogram (11.20.23 @ 08:58) >  CONCLUSIONS:  1. Normal mitral valve. Chordal systolic anterior motion.  Trace mitral regurgitation.  2. Aortic valve not well visualized, cannot rule out  bicuspid aortic valve.  No aortic stenosis. Mild to  moderate eccentric aortic regurgitation jet.  3. Asymmetric thickening of the basal septum, max  thickening 1.4cm. Cardiac MRI can be considered for further  evaluation as clinically indicated.  4. Hyperdynamic left ventricular systolic function (EF  >70%).  5. Normal right ventricular size and systolic function  (TAPSE 3.8 cm).  6. Normal tricuspid valve. Mild tricuspid regurgitation.  7. Spherical hypoechoic area is incidentally noted in the  liver, probably representing hepatic cyst. Consider  dedicated hepatic ultrasound for further evaluation if clinically indicated.  < end of copied text >    carotid duplex negative preliminarily     ___________________________________________________________________________________  ===============>>  A S S E S S M E N T   A N D   P L A N <<===============  ------------------------------------------------------------------------------------------    · Assessment	  Patient is a 79 y/o F with who ambulates independently with  PMH of HTN and carotid stenosis and PSH of appendectomy presented after a fall. Patient reports that after her usual run in the park she was walking at a slower pace to cool down when she tripped and fell on her left side. Patient is being admitted for further management of left femoral neck fracture.     Problem/Plan - 1:  ·  Problem: Fall.   ·  Plan: P/w left hip and left chest pain. S/p fall.   Xray pelvis - left femoral neck fracture.   CT chest - No evidence of acute thoracic traumatic injury on this noncontrast CT chest.  Tylenol - mild pain.   Oxycodone 2.5 - moderate pain  Oxycodone 5.0 - severe pain.   Ortho consulted -  Left hip hemiarthroplasty on Monday, 11/20/2023.   PT consult after surgery.    Problem/Plan - 2:  ·  Problem: Fracture of femoral neck, left.   ·  Plan: P/w left hip and left chest pain. S/p fall.   Xray pelvis - left femoral neck fracture.  pain management as above  vitamin D level 72 !  will need Bone density scan as outpatient ( discussed with pt)   Ortho on board >> Sx today  PT consult after surgery.  medically stable for OR     Problem/Plan - 3:  ·  Problem: Hypertension.   ·  Plan: Not on any medication.   Monitor blood pressure.    Problem/Plan - 4:  ·  Problem: Prophylactic measure.   ·  Plan: SCDs.    --------------------------------------------  Case discussed with patient ,  at bedside   Education given on findings and plan of care  ___________________________  H. IFEOMA Padron.  Pager: [phone]

## 2023-11-20 NOTE — CONSULT NOTE ADULT - PROBLEM SELECTOR RECOMMENDATION 9
Pt with acute left hip pain which is somatic and neuropathic in nature due to left hip fracture. NPO status maintained. Pt is scheduled for surgery today with MD Espinoza. High risk medications reviewed. Avoid polypharmacy. Avoid IV opioids. Avoid NSAIDs and benzodiazepines. Non-pharmacological sleep aides initiated. Non-opioid medications and non-pharmacological pain management measures initiated.    Opioid pain recommendations   - Continue Oxycodone 2.5/5 mg PO q 4 hours PRN moderate/severe pain. Monitor for sedation/ respiratory depression.   Non-opioid pain recommendations   - Acetaminophen 725 mg IV q 8 hours x 3 doses for 24 hrs while NPO. Then, switch to po q8h x 3 days.  Bowel Regimen  - Continue Miralax 17G PO daily  - Continue Senna 2 tablets at bedtime for constipation  Mild pain   - Non-pharmacological pain treatment recommendations  - Warm/ Cool packs PRN   - Repositioning extremity, guided imagery, relaxation, distraction.  - Physical therapy OOB if no contraindications   Recommendations discussed with primary team and RN. Pt with acute left hip pain which is somatic and neuropathic in nature due to left hip fracture. NPO status maintained. Pt is scheduled for surgery today with MD Espinoza. -  Procedure:  Left hip hemiarthroplasty.  High risk medications reviewed. Avoid polypharmacy. Avoid IV opioids. Avoid NSAIDs and benzodiazepines. Non-pharmacological sleep aides initiated. Non-opioid medications and non-pharmacological pain management measures initiated.    Opioid pain recommendations   - Continue Oxycodone 2.5/5 mg PO q 4 hours PRN moderate/severe pain. Monitor for sedation/ respiratory depression.   Non-opioid pain recommendations   - Acetaminophen 725 mg IV q 8 hours x 3 doses for 24 hrs while NPO. Then, switch to po q8h x 3 days.  Bowel Regimen  - Continue Miralax 17G PO daily  - Continue Senna 2 tablets at bedtime for constipation  Mild pain   - Non-pharmacological pain treatment recommendations  - Warm/ Cool packs PRN   - Repositioning extremity, guided imagery, relaxation, distraction.  - Physical therapy OOB if no contraindications   Recommendations discussed with primary team and RN.

## 2023-11-20 NOTE — PROGRESS NOTE ADULT - ASSESSMENT
77 y/o F, ambulates independently with PMH of HTN and carotid stenosis and PSH of appendectomy presented after  she tripped and fell on her left side.   Her pelvis X-ray showed a left femoral neck fracture and CT chest - No evidence of acute thoracic traumatic injury.    Admitted for further management of left femoral neck fracture. ortho consulted with plan for Left hip hemiarthroplasty on 11/20/2023.   Pt seen at bedside, co left hip pain with movement, no other complaints   Pt TTE

## 2023-11-20 NOTE — PROGRESS NOTE ADULT - SUBJECTIVE AND OBJECTIVE BOX
PROGRESS NOTE   CAROL ROY [id number]  78yFemale      Diagnosis:  78yFemale with Left Right Hip Femoral Neck Fracture    HPI:  Patient seen and evaluated at bedside. Patient with no acute complaints  Patient with mild pain to left hip  Pt denies Chest pain, SOB, dyspnea, paresthesias, N/V/D, abdominal pain, syncope, or pain anywhere else.     Vital Signs Last 24 Hrs  T(C): 37 (11-20-23 @ 05:40), Max: 37.1 (11-19-23 @ 12:25)  T(F): 98.6 (11-20-23 @ 05:40), Max: 98.7 (11-19-23 @ 12:25)  HR: 64 (11-20-23 @ 05:40) (48 - 64)  BP: 156/78 (11-20-23 @ 05:40) (143/80 - 164/74)  BP(mean): --  RR: 17 (11-20-23 @ 05:40) (17 - 18)  SpO2: 98% (11-20-23 @ 05:40) (95% - 98%)        Physical Exam:  General: AAOx3, NAD, resting in bed  Left Hip:  Skin is pink and warm. Tender at the fracture site. Decreased ROM of the affected hip due to pain. SILT. NVI. Positive logroll test. Positive heel strike.  Lower extremity:  No calf tenderness, calves are soft. 2+pulses. NVI. 5/5 Strength of FHL/EHL/ADF/APF b/l.  Good capillary refill. Warm, well-perfused.                                      13.4   7.27  )-----------( 180      ( 20 Nov 2023 04:58 )             40.2   11-20    135  |  103  |  15  ----------------------------<  99  3.6   |  29  |  0.72    Ca    9.5      20 Nov 2023 04:58  Phos  2.7     11-20  Mg     1.9     11-20    TPro  6.5  /  Alb  3.9  /  TBili  0.4  /  DBili  x   /  AST  26  /  ALT  37  /  AlkPhos  88  11-18        Imaging:    Impression:  78yFemale with Left Hip Femoral Neck Fracture    Plan:  -  Pain management  -  DVT prophylaxis with heparin and Venodynes   > Hold all anticoagulation today  for surgery  -  Keep NPO today for surgery  -  Fracture care with bedrest now, NWB of the affected extremity  -  Surgeon:  Dr. Espinoza  -  Procedure:  Left hip hemiarthroplasty  -  Medical Optimization/clearance noted  -  Chlorhexadine wipe and Povidone Iodine nasal swabs ordered   -  Consent: Pending  -  Case d/w Dr. Espinoza   PROGRESS NOTE   CAROL ROY [id number]  78yFemale      Diagnosis:  78yFemale with Left Right Hip Femoral Neck Fracture    HPI:  Patient seen and evaluated at bedside. Patient with no acute complaints  Patient with mild pain to left hip  Pt denies Chest pain, SOB, dyspnea, paresthesias, N/V/D, abdominal pain, syncope, or pain anywhere else.     Vital Signs Last 24 Hrs  T(C): 37 (11-20-23 @ 05:40), Max: 37.1 (11-19-23 @ 12:25)  T(F): 98.6 (11-20-23 @ 05:40), Max: 98.7 (11-19-23 @ 12:25)  HR: 64 (11-20-23 @ 05:40) (48 - 64)  BP: 156/78 (11-20-23 @ 05:40) (143/80 - 164/74)  BP(mean): --  RR: 17 (11-20-23 @ 05:40) (17 - 18)  SpO2: 98% (11-20-23 @ 05:40) (95% - 98%)        Physical Exam:  General: AAOx3, NAD, resting in bed  Left Hip:  Skin is pink and warm. Tender at the fracture site. Decreased ROM of the affected hip due to pain. SILT. NVI. Positive logroll test. Positive heel strike.  Lower extremity:  No calf tenderness, calves are soft. 2+pulses. NVI. 5/5 Strength of FHL/EHL/ADF/APF b/l.  Good capillary refill. Warm, well-perfused.                                      13.4   7.27  )-----------( 180      ( 20 Nov 2023 04:58 )             40.2   11-20    135  |  103  |  15  ----------------------------<  99  3.6   |  29  |  0.72    Ca    9.5      20 Nov 2023 04:58  Phos  2.7     11-20  Mg     1.9     11-20    TPro  6.5  /  Alb  3.9  /  TBili  0.4  /  DBili  x   /  AST  26  /  ALT  37  /  AlkPhos  88  11-18        Imaging:    Impression:  78yFemale with Left Hip Femoral Neck Fracture    Plan:  -  Pain management  -  DVT prophylaxis with heparin and Venodynes   > Hold all anticoagulation today  for surgery  -  Keep NPO today for surgery  -  Fracture care with bedrest now, NWB of the affected extremity  -  Surgeon:  Dr. Espinoza  -  Procedure:  Left hip hemiarthroplasty  -  Medical Optimization/clearance noted  -  Chlorhexadine wipe and Povidone Iodine nasal swabs ordered   -  Consent: Pending  -  Case d/w Dr. Espinoza   PROGRESS NOTE   CAROL ROY [id number]  78yFemale      Diagnosis:  78yFemale with Left Right Hip Femoral Neck Fracture    HPI:  Patient seen and evaluated at bedside. Patient with no acute complaints  Patient with mild pain to left hip  Pt denies Chest pain, SOB, dyspnea, paresthesias, N/V/D, abdominal pain, syncope, or pain anywhere else.     Vital Signs Last 24 Hrs  T(C): 37 (11-20-23 @ 05:40), Max: 37.1 (11-19-23 @ 12:25)  T(F): 98.6 (11-20-23 @ 05:40), Max: 98.7 (11-19-23 @ 12:25)  HR: 64 (11-20-23 @ 05:40) (48 - 64)  BP: 156/78 (11-20-23 @ 05:40) (143/80 - 164/74)  BP(mean): --  RR: 17 (11-20-23 @ 05:40) (17 - 18)  SpO2: 98% (11-20-23 @ 05:40) (95% - 98%)        Physical Exam:  General: AAOx3, NAD, resting in bed  Left Hip:  Skin is pink and warm. Tender at the fracture site. Decreased ROM of the affected hip due to pain. SILT. NVI. Positive logroll test. Positive heel strike.  Lower extremity:  No calf tenderness, calves are soft. 2+pulses. NVI. 5/5 Strength of FHL/EHL/ADF/APF b/l.  Good capillary refill. Warm, well-perfused.                                      13.4   7.27  )-----------( 180      ( 20 Nov 2023 04:58 )             40.2   11-20    135  |  103  |  15  ----------------------------<  99  3.6   |  29  |  0.72    Ca    9.5      20 Nov 2023 04:58  Phos  2.7     11-20  Mg     1.9     11-20    TPro  6.5  /  Alb  3.9  /  TBili  0.4  /  DBili  x   /  AST  26  /  ALT  37  /  AlkPhos  88  11-18        Imaging:    Impression:  78yFemale with Left Hip Femoral Neck Fracture    Plan:  -  Pain management  -  DVT prophylaxis with heparin and Venodynes   > Hold all anticoagulation today  for surgery  -  Keep NPO today for surgery  -  Fracture care with bedrest now, NWB of the affected extremity  -  Surgeon:  Dr. Espinoza  -  Procedure:  Left hip hemiarthroplasty  -  Medical Optimization/clearance noted  -  Chlorhexadine wipe and Povidone Iodine nasal swabs ordered   -  Consent: Pending  -  Case d/w Dr. Espinoza

## 2023-11-20 NOTE — CONSULT NOTE ADULT - ASSESSMENT
Confidential Drug Utilization Report  Search Terms: Rocio Crow, 1945Search Date: 11/20/2023 13:08:45 PM  The Drug Utilization Report below displays all of the controlled substance prescriptions, if any, that your patient has filled in the last twelve months. The information displayed on this report is compiled from pharmacy submissions to the Department, and accurately reflects the information as submitted by the pharmacies.    This report was requested by: Deanna Canada | Reference #: [id number]    There are no results for the search terms that you entered.      Confidential Drug Utilization Report  Search Terms: Rocio Crow, 1945Search Date: 11/20/2023 13:08:45 PM  The Drug Utilization Report below displays all of the controlled substance prescriptions, if any, that your patient has filled in the last twelve months. The information displayed on this report is compiled from pharmacy submissions to the Department, and accurately reflects the information as submitted by the pharmacies.    This report was requested by: Deanna Canada | Reference #: [id number]    There are no results for the search terms that you entered.      Confidential Drug Utilization Report  Search Terms: Rocio Crow, 1945Search Date: 11/20/2023 13:08:45 PM  The Drug Utilization Report below displays all of the controlled substance prescriptions, if any, that your patient has filled in the last twelve months. The information displayed on this report is compiled from pharmacy submissions to the Department, and accurately reflects the information as submitted by the pharmacies.    This report was requested by: Deanna Canada | Reference #: [id number]    There are no results for the search terms that you entered.

## 2023-11-20 NOTE — CONSULT NOTE ADULT - SUBJECTIVE AND OBJECTIVE BOX
Source of information: CAROL ROY, Chart review  Patient language: English  : n/a    HPI:  Patient is a 77 y/o F with who ambulates independently with  PMH of HTN and carotid stenosis and PSH of appendectomy presented after a fall.   Patient reports that after her usual run in the park she was walking at a slower pace to cool down when she tripped and fell on her left side. Patient reports she did not have any lightheadedness, or dizziness before the fall. Patient also denies hitting her head or losing consciousness. Patient reports that after the fall she had sharp pain in her left hip and left chest. Patient reports after the fall some passerby tried to help him get up but she was not able to stand back up.   Patient denies any recent fevers, chills, weakness, fatigue, malaise, headache, dizziness, lightheadedness, chest pain, palpitations, shortness of breath, cough, nausea, vomiting, abdominal pain, diarrhea, constipation, melena, hematochezia, dysuria, urinary frequency, or urgency. Patient denies any other complains at this time.   Patient reports taking multiple vitamins/supplements like vitamin D, calcium, magnesium, chondroitin sulphate, and glucosamine. Patient denies history of osteoporosis.     [attending addendum: discussed with pt's PMD / cardiologist, Dr Burton, patient with history of a carotid plaque Aortic insufficiency ( moderate) and possible CVA/TIA ( in [place])) but has not had any recent visit or cardiovascular workup.. >> Echo and carotic ordered)]      (18 Nov 2023 14:16)      Patient is a 78y old  Female who presents with a chief complaint of Left hip fracture (19 Nov 2023 11:06)  . Pt is admitted for ..., being treated with .... Pain consulted for .... Pt seen and examined at bedside. Reports pain score ***  SCALE USED: (1-10 VNRS). Pt describes pain as .... radiating to... alleviated by pain medication... exacerbated by movement... Pt tolerating PO diet. Denies lethargy, nausea, vomiting, constipation, itchiness. Reports last BM ***. Patient stated goal for pain control: to be able to take deep breaths, get out of bed to chair and ambulate with tolerable pain control. Pt denies taking medications for pain at home.     PAST MEDICAL & SURGICAL HISTORY:      FAMILY HISTORY:      Social History:  Patient denies smoking, alcohol use, or illicit drug use. (18 Nov 2023 14:16)   [ ] Denies ETOH use, illicit drug use and smoking    Allergies    No Known Allergies    Intolerances        MEDICATIONS  (STANDING):  polyethylene glycol 3350 17 Gram(s) Oral daily  povidone iodine 10% Solution 1 Application(s) Topical once  senna 2 Tablet(s) Oral at bedtime    MEDICATIONS  (PRN):  acetaminophen     Tablet .. 650 milliGRAM(s) Oral every 6 hours PRN Temp greater or equal to 38C (100.4F), Mild Pain (1 - 3)  melatonin 3 milliGRAM(s) Oral at bedtime PRN Insomnia  ondansetron Injectable 4 milliGRAM(s) IV Push every 8 hours PRN Nausea and/or Vomiting  oxyCODONE    IR 2.5 milliGRAM(s) Oral every 6 hours PRN Moderate Pain (4 - 6)  oxyCODONE    IR 5 milliGRAM(s) Oral every 6 hours PRN Severe Pain (7 - 10)      Vital Signs Last 24 Hrs  T(C): 37 (20 Nov 2023 05:40), Max: 37.1 (19 Nov 2023 12:25)  T(F): 98.6 (20 Nov 2023 05:40), Max: 98.7 (19 Nov 2023 12:25)  HR: 64 (20 Nov 2023 05:40) (48 - 64)  BP: 156/78 (20 Nov 2023 05:40) (143/80 - 164/74)  BP(mean): --  RR: 17 (20 Nov 2023 05:40) (17 - 18)  SpO2: 98% (20 Nov 2023 05:40) (95% - 98%)    Parameters below as of 20 Nov 2023 05:40  Patient On (Oxygen Delivery Method): room air        LABS: Reviewed.                          13.4   7.27  )-----------( 180      ( 20 Nov 2023 04:58 )             40.2     11-20    135  |  103  |  15  ----------------------------<  99  3.6   |  29  |  0.72    Ca    9.5      20 Nov 2023 04:58  Phos  2.7     11-20  Mg     1.9     11-20      PT/INR - ( 20 Nov 2023 04:58 )   PT: 11.2 sec;   INR: 0.98 ratio         PTT - ( 20 Nov 2023 04:58 )  PTT:28.6 sec    Urinalysis Basic - ( 20 Nov 2023 04:58 )    Color: x / Appearance: x / SG: x / pH: x  Gluc: 99 mg/dL / Ketone: x  / Bili: x / Urobili: x   Blood: x / Protein: x / Nitrite: x   Leuk Esterase: x / RBC: x / WBC x   Sq Epi: x / Non Sq Epi: x / Bacteria: x      CAPILLARY BLOOD GLUCOSE            Radiology: Reviewed.     ORT Score -   Family Hx of substance abuse	Female	      Male  Alcohol 	                                           1                     3  Illegal drugs	                                   2                     3  Rx drugs                                           4 	                  4  Personal Hx of substance abuse		  Alcohol 	                                          3	                  3  Illegal drugs                                     4	                  4  Rx drugs                                            5 	                  5  Age between 16- 45 years	           1                     1  hx preadolescent sexual abuse	   3 	                  0  Psychological disease		  ADD, OCD, bipolar, schizophrenia   2	          2  Depression                                           1 	          1  Total: 0    a score of 3 or lower indicates low risk for opioid abuse		  a score of 4-7 indicates moderate risk for opioid abuse		  a score of 8 or higher indicates high risk for opioid abuse  	  4AT (Assessment test for delirium & cognitive impairment)  _________________________________________________________  [1] ALERTNESS  This includes patients who may be markedly drowsy (eg. difficult to rouse and/or obviously sleepy  during assessment) or agitated/hyperactive. Observe the patient. If asleep, attempt to wake with  speech or gentle touch on shoulder. Ask the patient to state their name and address to assist rating.  Normal (fully alert, but not agitated, throughout assessment) 0  Mild sleepiness for <10 seconds after waking, then normal 0  Clearly abnormal 4    [2] AMT4  Age, date of birth, place (name of the hospital or building), current year.  No mistakes 0  1 mistake 1  2 or more mistakes/untestable 2    [3] ATTENTION  Ask the patient: “Please tell me the months of the year in backwards order, starting at December.”  To assist initial understanding one prompt of “what is the month before December?” is permitted.  Months of the year backwards Achieves 7 months or more correctly 0  Starts but scores <7 months / refuses to start 1   Untestable (cannot start because unwell, drowsy, inattentive) 2    [4] ACUTE CHANGE OR FLUCTUATING COURSE  Evidence of significant change or fluctuation in: alertness, cognition, other mental function  (eg. paranoia, hallucinations) arising over the last 2 weeks and still evident in last 24hrs  No 0  Yes 4    4 or above: possible delirium +/- cognitive impairment  1-3: possible cognitive impairment  0: delirium or severe cognitive impairment unlikely (but delirium still possible if [4] information incomplete)    4AT SCORE: 0    REVIEW OF SYSTEMS:  CONSTITUTIONAL: No fever or fatigue  HEENT:  No difficulty hearing, no change in vision  NECK: No pain or stiffness  RESPIRATORY: No cough, wheezing, chills or hemoptysis; No shortness of breath  CARDIOVASCULAR: No chest pain, palpitations, dizziness, or leg swelling  GASTROINTESTINAL: No loss of appetite, decreased PO intake. No abdominal or epigastric pain. No nausea, vomiting; No diarrhea or constipation.   GENITOURINARY: No dysuria, frequency, hematuria, retention or incontinence  MUSCULOSKELETAL: No joint pain or swelling; No muscle, back, or extremity pain, no upper or lower motor strength weakness, no saddle anesthesia, bowel/bladder incontinence, no falls   NEURO: No headaches, No numbness/tingling b/l LE, No weakness  ENDOCRINE: No polyuria, polydipsia, heat or cold intolerance; No hair loss  PSYCHIATRIC: No depression, anxiety or difficulty sleeping    PHYSICAL EXAM:  GENERAL:  Alert & Oriented X4, cooperative, NAD, Good concentration. Speech is clear.   RESPIRATORY: Respirations even and unlabored. Clear to auscultation bilaterally; No rales, rhonchi, wheezing, or rubs  CARDIOVASCULAR: Normal S1/S2, regular rate and rhythm; No murmurs, rubs, or gallops. No JVD.   GASTROINTESTINAL:  Soft, Nontender, Nondistended; Bowel sounds present  PERIPHERAL VASCULAR:  Extremities warm without edema. 2+ Peripheral Pulses, No cyanosis, No calf tenderness  MUSCULOSKELETAL: Motor Strength 5/5 B/L upper and lower extremities; moves all extremities equally against gravity; ROM intact; negative SLR; No tenderness on palpation of all joints.   SKIN: Warm, dry, intact. No rashes, lesions, scars or wounds.     Risk factors associated with adverse outcomes related to opioid treatment  [ ]  Concurrent benzodiazepine use  [ ]  History/ Active substance use or alcohol use disorder  [ ] Psychiatric co-morbidity  [ ] Sleep apnea  [ ] COPD  [ ] BMI> 35  [ ] Liver dysfunction  [ ] Renal dysfunction  [ ] CHF  [ ] Smoker  [ ]  Age > 60 years    [ ]  [place]  Reviewed and Copied to Chart. See below.    Plan of care and goal oriented pain management treatment options were discussed with patient and /or primary care giver; all questions and concerns were addressed and care was aligned with patient's wishes.    Educated patient on goal oriented pain management treatment options        Source of information: CAROL ROY, Chart review  Patient language: English  : n/a    HPI:  Patient is a 79 y/o F with who ambulates independently with  PMH of HTN and carotid stenosis and PSH of appendectomy presented after a fall.   Patient reports that after her usual run in the park she was walking at a slower pace to cool down when she tripped and fell on her left side. Patient reports she did not have any lightheadedness, or dizziness before the fall. Patient also denies hitting her head or losing consciousness. Patient reports that after the fall she had sharp pain in her left hip and left chest. Patient reports after the fall some passerby tried to help him get up but she was not able to stand back up.   Patient denies any recent fevers, chills, weakness, fatigue, malaise, headache, dizziness, lightheadedness, chest pain, palpitations, shortness of breath, cough, nausea, vomiting, abdominal pain, diarrhea, constipation, melena, hematochezia, dysuria, urinary frequency, or urgency. Patient denies any other complains at this time.   Patient reports taking multiple vitamins/supplements like vitamin D, calcium, magnesium, chondroitin sulphate, and glucosamine. Patient denies history of osteoporosis.     [attending addendum: discussed with pt's PMD / cardiologist, Dr Burton, patient with history of a carotid plaque Aortic insufficiency ( moderate) and possible CVA/TIA ( in [place])) but has not had any recent visit or cardiovascular workup.. >> Echo and carotic ordered)]      (18 Nov 2023 14:16)      Patient is a 78y old  Female who presents with a chief complaint of Left hip fracture (19 Nov 2023 11:06)  . Pt is admitted for ..., being treated with .... Pain consulted for .... Pt seen and examined at bedside. Reports pain score ***  SCALE USED: (1-10 VNRS). Pt describes pain as .... radiating to... alleviated by pain medication... exacerbated by movement... Pt tolerating PO diet. Denies lethargy, nausea, vomiting, constipation, itchiness. Reports last BM ***. Patient stated goal for pain control: to be able to take deep breaths, get out of bed to chair and ambulate with tolerable pain control. Pt denies taking medications for pain at home.     PAST MEDICAL & SURGICAL HISTORY:      FAMILY HISTORY:      Social History:  Patient denies smoking, alcohol use, or illicit drug use. (18 Nov 2023 14:16)   [ ] Denies ETOH use, illicit drug use and smoking    Allergies    No Known Allergies    Intolerances        MEDICATIONS  (STANDING):  polyethylene glycol 3350 17 Gram(s) Oral daily  povidone iodine 10% Solution 1 Application(s) Topical once  senna 2 Tablet(s) Oral at bedtime    MEDICATIONS  (PRN):  acetaminophen     Tablet .. 650 milliGRAM(s) Oral every 6 hours PRN Temp greater or equal to 38C (100.4F), Mild Pain (1 - 3)  melatonin 3 milliGRAM(s) Oral at bedtime PRN Insomnia  ondansetron Injectable 4 milliGRAM(s) IV Push every 8 hours PRN Nausea and/or Vomiting  oxyCODONE    IR 2.5 milliGRAM(s) Oral every 6 hours PRN Moderate Pain (4 - 6)  oxyCODONE    IR 5 milliGRAM(s) Oral every 6 hours PRN Severe Pain (7 - 10)      Vital Signs Last 24 Hrs  T(C): 37 (20 Nov 2023 05:40), Max: 37.1 (19 Nov 2023 12:25)  T(F): 98.6 (20 Nov 2023 05:40), Max: 98.7 (19 Nov 2023 12:25)  HR: 64 (20 Nov 2023 05:40) (48 - 64)  BP: 156/78 (20 Nov 2023 05:40) (143/80 - 164/74)  BP(mean): --  RR: 17 (20 Nov 2023 05:40) (17 - 18)  SpO2: 98% (20 Nov 2023 05:40) (95% - 98%)    Parameters below as of 20 Nov 2023 05:40  Patient On (Oxygen Delivery Method): room air        LABS: Reviewed.                          13.4   7.27  )-----------( 180      ( 20 Nov 2023 04:58 )             40.2     11-20    135  |  103  |  15  ----------------------------<  99  3.6   |  29  |  0.72    Ca    9.5      20 Nov 2023 04:58  Phos  2.7     11-20  Mg     1.9     11-20      PT/INR - ( 20 Nov 2023 04:58 )   PT: 11.2 sec;   INR: 0.98 ratio         PTT - ( 20 Nov 2023 04:58 )  PTT:28.6 sec    Urinalysis Basic - ( 20 Nov 2023 04:58 )    Color: x / Appearance: x / SG: x / pH: x  Gluc: 99 mg/dL / Ketone: x  / Bili: x / Urobili: x   Blood: x / Protein: x / Nitrite: x   Leuk Esterase: x / RBC: x / WBC x   Sq Epi: x / Non Sq Epi: x / Bacteria: x      CAPILLARY BLOOD GLUCOSE            Radiology: Reviewed.     ORT Score -   Family Hx of substance abuse	Female	      Male  Alcohol 	                                           1                     3  Illegal drugs	                                   2                     3  Rx drugs                                           4 	                  4  Personal Hx of substance abuse		  Alcohol 	                                          3	                  3  Illegal drugs                                     4	                  4  Rx drugs                                            5 	                  5  Age between 16- 45 years	           1                     1  hx preadolescent sexual abuse	   3 	                  0  Psychological disease		  ADD, OCD, bipolar, schizophrenia   2	          2  Depression                                           1 	          1  Total: 0    a score of 3 or lower indicates low risk for opioid abuse		  a score of 4-7 indicates moderate risk for opioid abuse		  a score of 8 or higher indicates high risk for opioid abuse  	  4AT (Assessment test for delirium & cognitive impairment)  _________________________________________________________  [1] ALERTNESS  This includes patients who may be markedly drowsy (eg. difficult to rouse and/or obviously sleepy  during assessment) or agitated/hyperactive. Observe the patient. If asleep, attempt to wake with  speech or gentle touch on shoulder. Ask the patient to state their name and address to assist rating.  Normal (fully alert, but not agitated, throughout assessment) 0  Mild sleepiness for <10 seconds after waking, then normal 0  Clearly abnormal 4    [2] AMT4  Age, date of birth, place (name of the hospital or building), current year.  No mistakes 0  1 mistake 1  2 or more mistakes/untestable 2    [3] ATTENTION  Ask the patient: “Please tell me the months of the year in backwards order, starting at December.”  To assist initial understanding one prompt of “what is the month before December?” is permitted.  Months of the year backwards Achieves 7 months or more correctly 0  Starts but scores <7 months / refuses to start 1   Untestable (cannot start because unwell, drowsy, inattentive) 2    [4] ACUTE CHANGE OR FLUCTUATING COURSE  Evidence of significant change or fluctuation in: alertness, cognition, other mental function  (eg. paranoia, hallucinations) arising over the last 2 weeks and still evident in last 24hrs  No 0  Yes 4    4 or above: possible delirium +/- cognitive impairment  1-3: possible cognitive impairment  0: delirium or severe cognitive impairment unlikely (but delirium still possible if [4] information incomplete)    4AT SCORE: 0    REVIEW OF SYSTEMS:  CONSTITUTIONAL: No fever or fatigue  HEENT:  No difficulty hearing, no change in vision  NECK: No pain or stiffness  RESPIRATORY: No cough, wheezing, chills or hemoptysis; No shortness of breath  CARDIOVASCULAR: No chest pain, palpitations, dizziness, or leg swelling  GASTROINTESTINAL: No loss of appetite, decreased PO intake. No abdominal or epigastric pain. No nausea, vomiting; No diarrhea or constipation.   GENITOURINARY: No dysuria, frequency, hematuria, retention or incontinence  MUSCULOSKELETAL: No joint pain or swelling; No muscle, back, or extremity pain, no upper or lower motor strength weakness, no saddle anesthesia, bowel/bladder incontinence, no falls   NEURO: No headaches, No numbness/tingling b/l LE, No weakness  ENDOCRINE: No polyuria, polydipsia, heat or cold intolerance; No hair loss  PSYCHIATRIC: No depression, anxiety or difficulty sleeping    PHYSICAL EXAM:  GENERAL:  Alert & Oriented X4, cooperative, NAD, Good concentration. Speech is clear.   RESPIRATORY: Respirations even and unlabored. Clear to auscultation bilaterally; No rales, rhonchi, wheezing, or rubs  CARDIOVASCULAR: Normal S1/S2, regular rate and rhythm; No murmurs, rubs, or gallops. No JVD.   GASTROINTESTINAL:  Soft, Nontender, Nondistended; Bowel sounds present  PERIPHERAL VASCULAR:  Extremities warm without edema. 2+ Peripheral Pulses, No cyanosis, No calf tenderness  MUSCULOSKELETAL: Motor Strength 5/5 B/L upper and lower extremities; moves all extremities equally against gravity; ROM intact; negative SLR; No tenderness on palpation of all joints.   SKIN: Warm, dry, intact. No rashes, lesions, scars or wounds.     Risk factors associated with adverse outcomes related to opioid treatment  [ ]  Concurrent benzodiazepine use  [ ]  History/ Active substance use or alcohol use disorder  [ ] Psychiatric co-morbidity  [ ] Sleep apnea  [ ] COPD  [ ] BMI> 35  [ ] Liver dysfunction  [ ] Renal dysfunction  [ ] CHF  [ ] Smoker  [ ]  Age > 60 years    [ ]  [place]  Reviewed and Copied to Chart. See below.    Plan of care and goal oriented pain management treatment options were discussed with patient and /or primary care giver; all questions and concerns were addressed and care was aligned with patient's wishes.    Educated patient on goal oriented pain management treatment options        Source of information: CAROL ROY, Chart review  Patient language: English  : n/a    HPI:  Patient is a 77 y/o F with who ambulates independently with  PMH of HTN and carotid stenosis and PSH of appendectomy presented after a fall.   Patient reports that after her usual run in the park she was walking at a slower pace to cool down when she tripped and fell on her left side. Patient reports she did not have any lightheadedness, or dizziness before the fall. Patient also denies hitting her head or losing consciousness. Patient reports that after the fall she had sharp pain in her left hip and left chest. Patient reports after the fall some passerby tried to help him get up but she was not able to stand back up.   Patient denies any recent fevers, chills, weakness, fatigue, malaise, headache, dizziness, lightheadedness, chest pain, palpitations, shortness of breath, cough, nausea, vomiting, abdominal pain, diarrhea, constipation, melena, hematochezia, dysuria, urinary frequency, or urgency. Patient denies any other complains at this time.   Patient reports taking multiple vitamins/supplements like vitamin D, calcium, magnesium, chondroitin sulphate, and glucosamine. Patient denies history of osteoporosis.     [attending addendum: discussed with pt's PMD / cardiologist, Dr Burton, patient with history of a carotid plaque Aortic insufficiency ( moderate) and possible CVA/TIA ( in [place])) but has not had any recent visit or cardiovascular workup.. >> Echo and carotic ordered)]      (18 Nov 2023 14:16)      Patient is a 78y old  Female who presents with a chief complaint of Left hip fracture (19 Nov 2023 11:06)  . Pt is admitted for ..., being treated with .... Pain consulted for .... Pt seen and examined at bedside. Reports pain score ***  SCALE USED: (1-10 VNRS). Pt describes pain as .... radiating to... alleviated by pain medication... exacerbated by movement... Pt tolerating PO diet. Denies lethargy, nausea, vomiting, constipation, itchiness. Reports last BM ***. Patient stated goal for pain control: to be able to take deep breaths, get out of bed to chair and ambulate with tolerable pain control. Pt denies taking medications for pain at home.     PAST MEDICAL & SURGICAL HISTORY:      FAMILY HISTORY:      Social History:  Patient denies smoking, alcohol use, or illicit drug use. (18 Nov 2023 14:16)   [ ] Denies ETOH use, illicit drug use and smoking    Allergies    No Known Allergies    Intolerances        MEDICATIONS  (STANDING):  polyethylene glycol 3350 17 Gram(s) Oral daily  povidone iodine 10% Solution 1 Application(s) Topical once  senna 2 Tablet(s) Oral at bedtime    MEDICATIONS  (PRN):  acetaminophen     Tablet .. 650 milliGRAM(s) Oral every 6 hours PRN Temp greater or equal to 38C (100.4F), Mild Pain (1 - 3)  melatonin 3 milliGRAM(s) Oral at bedtime PRN Insomnia  ondansetron Injectable 4 milliGRAM(s) IV Push every 8 hours PRN Nausea and/or Vomiting  oxyCODONE    IR 2.5 milliGRAM(s) Oral every 6 hours PRN Moderate Pain (4 - 6)  oxyCODONE    IR 5 milliGRAM(s) Oral every 6 hours PRN Severe Pain (7 - 10)      Vital Signs Last 24 Hrs  T(C): 37 (20 Nov 2023 05:40), Max: 37.1 (19 Nov 2023 12:25)  T(F): 98.6 (20 Nov 2023 05:40), Max: 98.7 (19 Nov 2023 12:25)  HR: 64 (20 Nov 2023 05:40) (48 - 64)  BP: 156/78 (20 Nov 2023 05:40) (143/80 - 164/74)  BP(mean): --  RR: 17 (20 Nov 2023 05:40) (17 - 18)  SpO2: 98% (20 Nov 2023 05:40) (95% - 98%)    Parameters below as of 20 Nov 2023 05:40  Patient On (Oxygen Delivery Method): room air        LABS: Reviewed.                          13.4   7.27  )-----------( 180      ( 20 Nov 2023 04:58 )             40.2     11-20    135  |  103  |  15  ----------------------------<  99  3.6   |  29  |  0.72    Ca    9.5      20 Nov 2023 04:58  Phos  2.7     11-20  Mg     1.9     11-20      PT/INR - ( 20 Nov 2023 04:58 )   PT: 11.2 sec;   INR: 0.98 ratio         PTT - ( 20 Nov 2023 04:58 )  PTT:28.6 sec    Urinalysis Basic - ( 20 Nov 2023 04:58 )    Color: x / Appearance: x / SG: x / pH: x  Gluc: 99 mg/dL / Ketone: x  / Bili: x / Urobili: x   Blood: x / Protein: x / Nitrite: x   Leuk Esterase: x / RBC: x / WBC x   Sq Epi: x / Non Sq Epi: x / Bacteria: x      CAPILLARY BLOOD GLUCOSE            Radiology: Reviewed.     ORT Score -   Family Hx of substance abuse	Female	      Male  Alcohol 	                                           1                     3  Illegal drugs	                                   2                     3  Rx drugs                                           4 	                  4  Personal Hx of substance abuse		  Alcohol 	                                          3	                  3  Illegal drugs                                     4	                  4  Rx drugs                                            5 	                  5  Age between 16- 45 years	           1                     1  hx preadolescent sexual abuse	   3 	                  0  Psychological disease		  ADD, OCD, bipolar, schizophrenia   2	          2  Depression                                           1 	          1  Total: 0    a score of 3 or lower indicates low risk for opioid abuse		  a score of 4-7 indicates moderate risk for opioid abuse		  a score of 8 or higher indicates high risk for opioid abuse  	  4AT (Assessment test for delirium & cognitive impairment)  _________________________________________________________  [1] ALERTNESS  This includes patients who may be markedly drowsy (eg. difficult to rouse and/or obviously sleepy  during assessment) or agitated/hyperactive. Observe the patient. If asleep, attempt to wake with  speech or gentle touch on shoulder. Ask the patient to state their name and address to assist rating.  Normal (fully alert, but not agitated, throughout assessment) 0  Mild sleepiness for <10 seconds after waking, then normal 0  Clearly abnormal 4    [2] AMT4  Age, date of birth, place (name of the hospital or building), current year.  No mistakes 0  1 mistake 1  2 or more mistakes/untestable 2    [3] ATTENTION  Ask the patient: “Please tell me the months of the year in backwards order, starting at December.”  To assist initial understanding one prompt of “what is the month before December?” is permitted.  Months of the year backwards Achieves 7 months or more correctly 0  Starts but scores <7 months / refuses to start 1   Untestable (cannot start because unwell, drowsy, inattentive) 2    [4] ACUTE CHANGE OR FLUCTUATING COURSE  Evidence of significant change or fluctuation in: alertness, cognition, other mental function  (eg. paranoia, hallucinations) arising over the last 2 weeks and still evident in last 24hrs  No 0  Yes 4    4 or above: possible delirium +/- cognitive impairment  1-3: possible cognitive impairment  0: delirium or severe cognitive impairment unlikely (but delirium still possible if [4] information incomplete)    4AT SCORE: 0    REVIEW OF SYSTEMS:  CONSTITUTIONAL: No fever or fatigue  HEENT:  No difficulty hearing, no change in vision  NECK: No pain or stiffness  RESPIRATORY: No cough, wheezing, chills or hemoptysis; No shortness of breath  CARDIOVASCULAR: No chest pain, palpitations, dizziness, or leg swelling  GASTROINTESTINAL: No loss of appetite, decreased PO intake. No abdominal or epigastric pain. No nausea, vomiting; No diarrhea or constipation.   GENITOURINARY: No dysuria, frequency, hematuria, retention or incontinence  MUSCULOSKELETAL: No joint pain or swelling; No muscle, back, or extremity pain, no upper or lower motor strength weakness, no saddle anesthesia, bowel/bladder incontinence, no falls   NEURO: No headaches, No numbness/tingling b/l LE, No weakness  ENDOCRINE: No polyuria, polydipsia, heat or cold intolerance; No hair loss  PSYCHIATRIC: No depression, anxiety or difficulty sleeping    PHYSICAL EXAM:  GENERAL:  Alert & Oriented X4, cooperative, NAD, Good concentration. Speech is clear.   RESPIRATORY: Respirations even and unlabored. Clear to auscultation bilaterally; No rales, rhonchi, wheezing, or rubs  CARDIOVASCULAR: Normal S1/S2, regular rate and rhythm; No murmurs, rubs, or gallops. No JVD.   GASTROINTESTINAL:  Soft, Nontender, Nondistended; Bowel sounds present  PERIPHERAL VASCULAR:  Extremities warm without edema. 2+ Peripheral Pulses, No cyanosis, No calf tenderness  MUSCULOSKELETAL: Motor Strength 5/5 B/L upper and lower extremities; moves all extremities equally against gravity; ROM intact; negative SLR; No tenderness on palpation of all joints.   SKIN: Warm, dry, intact. No rashes, lesions, scars or wounds.     Risk factors associated with adverse outcomes related to opioid treatment  [ ]  Concurrent benzodiazepine use  [ ]  History/ Active substance use or alcohol use disorder  [ ] Psychiatric co-morbidity  [ ] Sleep apnea  [ ] COPD  [ ] BMI> 35  [ ] Liver dysfunction  [ ] Renal dysfunction  [ ] CHF  [ ] Smoker  [ ]  Age > 60 years    [ ]  [place]  Reviewed and Copied to Chart. See below.    Plan of care and goal oriented pain management treatment options were discussed with patient and /or primary care giver; all questions and concerns were addressed and care was aligned with patient's wishes.    Educated patient on goal oriented pain management treatment options        Source of information: CAROL ROY, Chart review  Patient language: English  : n/a    HPI:  Patient is a 79 y/o F with who ambulates independently with  PMH of HTN and carotid stenosis and PSH of appendectomy presented after a fall.   Patient reports that after her usual run in the park she was walking at a slower pace to cool down when she tripped and fell on her left side. Patient reports she did not have any lightheadedness, or dizziness before the fall. Patient also denies hitting her head or losing consciousness. Patient reports that after the fall she had sharp pain in her left hip and left chest. Patient reports after the fall some passerby tried to help him get up but she was not able to stand back up.   Patient denies any recent fevers, chills, weakness, fatigue, malaise, headache, dizziness, lightheadedness, chest pain, palpitations, shortness of breath, cough, nausea, vomiting, abdominal pain, diarrhea, constipation, melena, hematochezia, dysuria, urinary frequency, or urgency. Patient denies any other complains at this time.   Patient reports taking multiple vitamins/supplements like vitamin D, calcium, magnesium, chondroitin sulphate, and glucosamine. Patient denies history of osteoporosis.     [attending addendum: discussed with pt's PMD / cardiologist, Dr Burton, patient with history of a carotid plaque Aortic insufficiency ( moderate) and possible CVA/TIA ( in [place])) but has not had any recent visit or cardiovascular workup.. >> Echo and carotic ordered)]      (18 Nov 2023 14:16)      Pt is admitted for s/p Fall. GOHCO. Left Femoral neck fracture. Pain consulted for left hip pain.  Pt seen and examined at bedside. Reports pain score 5/10 on left hip when at rest and not moving, tolerable. But when moving left leg pain is 7-8/10 not tolerable. SCALE USED: (1-10 VNRS). Pt describes pain as aching and radiating to LLE, alleviated by pain medication and exacerbated by movement. Pt tolerating is NPO today. Denies lethargy, nausea, vomiting, constipation, itchiness. Reports last BM 11/18. Patient stated goal for pain control: to be able to take deep breaths, get out of bed to chair and ambulate with tolerable pain control. Pt reports taking Advil for pain at home. Pt is scheduled for surgery today with MD Espinoza.    PAST MEDICAL & SURGICAL HISTORY:      FAMILY HISTORY:    Social History:  Patient denies smoking, alcohol use, or illicit drug use. (18 Nov 2023 14:16)   [x] Denies ETOH use, illicit drug use and smoking    Allergies    No Known Allergies    Intolerances    MEDICATIONS  (STANDING):  polyethylene glycol 3350 17 Gram(s) Oral daily  povidone iodine 10% Solution 1 Application(s) Topical once  senna 2 Tablet(s) Oral at bedtime    MEDICATIONS  (PRN):  acetaminophen     Tablet .. 650 milliGRAM(s) Oral every 6 hours PRN Temp greater or equal to 38C (100.4F), Mild Pain (1 - 3)  melatonin 3 milliGRAM(s) Oral at bedtime PRN Insomnia  ondansetron Injectable 4 milliGRAM(s) IV Push every 8 hours PRN Nausea and/or Vomiting  oxyCODONE    IR 2.5 milliGRAM(s) Oral every 6 hours PRN Moderate Pain (4 - 6)  oxyCODONE    IR 5 milliGRAM(s) Oral every 6 hours PRN Severe Pain (7 - 10)    Vital Signs Last 24 Hrs  T(C): 37 (20 Nov 2023 05:40), Max: 37.1 (19 Nov 2023 12:25)  T(F): 98.6 (20 Nov 2023 05:40), Max: 98.7 (19 Nov 2023 12:25)  HR: 64 (20 Nov 2023 05:40) (48 - 64)  BP: 156/78 (20 Nov 2023 05:40) (143/80 - 164/74)  BP(mean): --  RR: 17 (20 Nov 2023 05:40) (17 - 18)  SpO2: 98% (20 Nov 2023 05:40) (95% - 98%)    Parameters below as of 20 Nov 2023 05:40  Patient On (Oxygen Delivery Method): room air    LABS: Reviewed.                        13.4   7.27  )-----------( 180      ( 20 Nov 2023 04:58 )             40.2     11-20    135  |  103  |  15  ----------------------------<  99  3.6   |  29  |  0.72    Ca    9.5      20 Nov 2023 04:58  Phos  2.7     11-20  Mg     1.9     11-20      PT/INR - ( 20 Nov 2023 04:58 )   PT: 11.2 sec;   INR: 0.98 ratio       PTT - ( 20 Nov 2023 04:58 )  PTT:28.6 sec    Urinalysis Basic - ( 20 Nov 2023 04:58 )    Color: x / Appearance: x / SG: x / pH: x  Gluc: 99 mg/dL / Ketone: x  / Bili: x / Urobili: x   Blood: x / Protein: x / Nitrite: x   Leuk Esterase: x / RBC: x / WBC x   Sq Epi: x / Non Sq Epi: x / Bacteria: x    CAPILLARY BLOOD GLUCOSE    Radiology: Reviewed.     ACC: [id number] EXAM:  CT HIP ONLY LT   ORDERED BY: FRITZ TORRES     PROCEDURE DATE:  11/20/2023          INTERPRETATION:  CLINICAL INDICATION: Fall, assess left hip fracture.    TECHNIQUE: CT axial images of the left hip were obtained without   intravenous contrast. Coronal and sagittal reformatted images were also   obtained.    CONTRAST/COMPLICATIONS:  IV Contrast: NONE  Complications: None reported at time of study completion    COMPARISON: XR: 11/18/2023. CT: None. MR: None.    FINDINGS: Evaluation of soft tissue is limited without intravenous   contrast.    Bones: Decreased bone mineralization. Acute impacted left femoral neck   fracture with superomedial displacement. No dislocation    Degenerative changes of the visualized left sacroiliac joint, pubic   symphysis and left hip.    Soft tissue: Stranding at the left lateral hip superficial soft tissue,   likely edema/ecchymosis. Stranding at the deep left hip/proximal thigh   soft tissue. Diffuse muscle bulk loss with fatty replacement. Left hip   hemarthrosis. Chondrocalcinosis at the left hip and pubic symphysis.    Additional: Colon diverticulosis. Atherosclerosis.    IMPRESSION:    Acute impacted left femoral neck fracture.    --- End of Report ---    DAVE TENORIO MD; Attending Radiologist  This document has been electronically signed. Nov 20 2023  1:27AM  ACC: [id number] EXAM:  CT CHEST   ORDERED BY: MERA TIPTON     PROCEDURE DATE:  11/18/2023      INTERPRETATION:  HISTORY: Chest wall pain after fall.    EXAMINATION: CT CHEST was performed without IV contrast.    COMPARISON: None available.    FINDINGS:    AIRWAYS, LUNGS, PLEURA: Clear central airways. No consolidation.   Nonspecific right apical 0.6 cm ground-glass nodule with focal 0.3 cm   solid component (image 69, series 2). No pleural effusion.    MEDIASTINUM: Cardiomegaly. No pericardial effusion. Thoracic aorta normal   caliber.  No large mediastinal lymph nodes.    IMAGED ABDOMEN: Right hepatic lobe hypodensities, likely cysts.    SOFT TISSUES: Unremarkable.    BONES: No acute osseous abnormality.    IMPRESSION:.    No evidence of acute thoracic traumatic injury on this noncontrast CT   chest.    Nonspecific right apical 0.6 upper ground-glass nodule with focal 0.3 cm   solid component. Recommend CT chest follow-up in 3 months to assess   stability.    --- End of Report ---     DEJON RAMÍREZ MD; Attending Radiologist  This document has been electronically signed. Nov 18 2023 11:20AM    ORT Score -   Family Hx of substance abuse	Female	      Male  Alcohol 	                                           1                     3  Illegal drugs	                                   2                     3  Rx drugs                                           4 	                  4  Personal Hx of substance abuse		  Alcohol 	                                          3	                  3  Illegal drugs                                     4	                  4  Rx drugs                                            5 	                  5  Age between 16- 45 years	           1                     1  hx preadolescent sexual abuse	   3 	                  0  Psychological disease		  ADD, OCD, bipolar, schizophrenia   2	          2  Depression                                           1 	          1  Total: 0    a score of 3 or lower indicates low risk for opioid abuse		  a score of 4-7 indicates moderate risk for opioid abuse		  a score of 8 or higher indicates high risk for opioid abuse  	  REVIEW OF SYSTEMS:  CONSTITUTIONAL: No fever or fatigue  HEENT:  No difficulty hearing, no change in vision  NECK: No pain or stiffness  RESPIRATORY: No cough, wheezing, chills or hemoptysis; No shortness of breath  CARDIOVASCULAR: No chest pain, palpitations, dizziness, or leg swelling  GASTROINTESTINAL: No loss of appetite, decreased PO intake. No abdominal or epigastric pain. No nausea, vomiting; No diarrhea or constipation.   GENITOURINARY: No dysuria, frequency, hematuria, retention or incontinence  MUSCULOSKELETAL: + Left hip pain and swelling; No muscle, back, pain, no upper muscle strength weakness, no saddle anesthesia, bowel/bladder incontinence, + falls.   NEURO: No headaches, No numbness/tingling b/l LE, No weakness  ENDOCRINE: No polyuria, polydipsia, heat or cold intolerance; No hair loss  PSYCHIATRIC: No depression, anxiety or difficulty sleeping    PHYSICAL EXAM:  GENERAL:  Alert & Oriented X4, cooperative, NAD, Good concentration. Speech is clear.   RESPIRATORY: Respirations even and unlabored. Clear to auscultation bilaterally; No rales, rhonchi, wheezing, or rubs  CARDIOVASCULAR: Normal S1/S2, regular rate and rhythm; No murmurs, rubs, or gallops. No JVD.   GASTROINTESTINAL:  Soft, Nontender, Nondistended; Bowel sounds present. NPO order in place.  PERIPHERAL VASCULAR:  Extremities warm without edema. 2+ Peripheral Pulses, No cyanosis, No calf tenderness  MUSCULOSKELETAL: Motor Strength 5/5 B/L upper and 2/5 lower extremities; moves all extremities equally against gravity; ROM decreased to LLE due to pain; + tenderness on palpation of left hip.  SKIN: Warm, dry, intact. No rashes, lesions, scars or wounds.     Risk factors associated with adverse outcomes related to opioid treatment  [ ]  Concurrent benzodiazepine use  [ ]  History/ Active substance use or alcohol use disorder  [ ] Psychiatric co-morbidity  [ ] Sleep apnea  [ ] COPD  [ ] BMI> 35  [ ] Liver dysfunction  [ ] Renal dysfunction  [ ] CHF  [ ] Smoker  [x]  Age > 60 years    [x]  [place]  Reviewed and Copied to Chart. See below.    Plan of care and goal oriented pain management treatment options were discussed with patient and /or primary care giver; all questions and concerns were addressed and care was aligned with patient's wishes.    Educated patient on goal oriented pain management treatment options        Source of information: CAROL ROY, Chart review  Patient language: English  : n/a    HPI:  Patient is a 79 y/o F with who ambulates independently with  PMH of HTN and carotid stenosis and PSH of appendectomy presented after a fall.   Patient reports that after her usual run in the park she was walking at a slower pace to cool down when she tripped and fell on her left side. Patient reports she did not have any lightheadedness, or dizziness before the fall. Patient also denies hitting her head or losing consciousness. Patient reports that after the fall she had sharp pain in her left hip and left chest. Patient reports after the fall some passerby tried to help him get up but she was not able to stand back up.   Patient denies any recent fevers, chills, weakness, fatigue, malaise, headache, dizziness, lightheadedness, chest pain, palpitations, shortness of breath, cough, nausea, vomiting, abdominal pain, diarrhea, constipation, melena, hematochezia, dysuria, urinary frequency, or urgency. Patient denies any other complains at this time.   Patient reports taking multiple vitamins/supplements like vitamin D, calcium, magnesium, chondroitin sulphate, and glucosamine. Patient denies history of osteoporosis.     [attending addendum: discussed with pt's PMD / cardiologist, Dr Burton, patient with history of a carotid plaque Aortic insufficiency ( moderate) and possible CVA/TIA ( in [place])) but has not had any recent visit or cardiovascular workup.. >> Echo and carotic ordered)]      (18 Nov 2023 14:16)      Pt is admitted for s/p Fall. GOHCO. Left Femoral neck fracture. Pain consulted for left hip pain.  Pt seen and examined at bedside. Reports pain score 5/10 on left hip when at rest and not moving, tolerable. But when moving left leg pain is 7-8/10 not tolerable. SCALE USED: (1-10 VNRS). Pt describes pain as aching and radiating to LLE, alleviated by pain medication and exacerbated by movement. Pt tolerating is NPO today. Denies lethargy, nausea, vomiting, constipation, itchiness. Reports last BM 11/18. Patient stated goal for pain control: to be able to take deep breaths, get out of bed to chair and ambulate with tolerable pain control. Pt reports taking Advil for pain at home. Pt is scheduled for surgery today with MD Espinoza.    PAST MEDICAL & SURGICAL HISTORY:      FAMILY HISTORY:    Social History:  Patient denies smoking, alcohol use, or illicit drug use. (18 Nov 2023 14:16)   [x] Denies ETOH use, illicit drug use and smoking    Allergies    No Known Allergies    Intolerances    MEDICATIONS  (STANDING):  polyethylene glycol 3350 17 Gram(s) Oral daily  povidone iodine 10% Solution 1 Application(s) Topical once  senna 2 Tablet(s) Oral at bedtime    MEDICATIONS  (PRN):  acetaminophen     Tablet .. 650 milliGRAM(s) Oral every 6 hours PRN Temp greater or equal to 38C (100.4F), Mild Pain (1 - 3)  melatonin 3 milliGRAM(s) Oral at bedtime PRN Insomnia  ondansetron Injectable 4 milliGRAM(s) IV Push every 8 hours PRN Nausea and/or Vomiting  oxyCODONE    IR 2.5 milliGRAM(s) Oral every 6 hours PRN Moderate Pain (4 - 6)  oxyCODONE    IR 5 milliGRAM(s) Oral every 6 hours PRN Severe Pain (7 - 10)    Vital Signs Last 24 Hrs  T(C): 37 (20 Nov 2023 05:40), Max: 37.1 (19 Nov 2023 12:25)  T(F): 98.6 (20 Nov 2023 05:40), Max: 98.7 (19 Nov 2023 12:25)  HR: 64 (20 Nov 2023 05:40) (48 - 64)  BP: 156/78 (20 Nov 2023 05:40) (143/80 - 164/74)  BP(mean): --  RR: 17 (20 Nov 2023 05:40) (17 - 18)  SpO2: 98% (20 Nov 2023 05:40) (95% - 98%)    Parameters below as of 20 Nov 2023 05:40  Patient On (Oxygen Delivery Method): room air    LABS: Reviewed.                        13.4   7.27  )-----------( 180      ( 20 Nov 2023 04:58 )             40.2     11-20    135  |  103  |  15  ----------------------------<  99  3.6   |  29  |  0.72    Ca    9.5      20 Nov 2023 04:58  Phos  2.7     11-20  Mg     1.9     11-20      PT/INR - ( 20 Nov 2023 04:58 )   PT: 11.2 sec;   INR: 0.98 ratio       PTT - ( 20 Nov 2023 04:58 )  PTT:28.6 sec    Urinalysis Basic - ( 20 Nov 2023 04:58 )    Color: x / Appearance: x / SG: x / pH: x  Gluc: 99 mg/dL / Ketone: x  / Bili: x / Urobili: x   Blood: x / Protein: x / Nitrite: x   Leuk Esterase: x / RBC: x / WBC x   Sq Epi: x / Non Sq Epi: x / Bacteria: x    CAPILLARY BLOOD GLUCOSE    Radiology: Reviewed.     ACC: [id number] EXAM:  CT HIP ONLY LT   ORDERED BY: FRITZ TORRES     PROCEDURE DATE:  11/20/2023          INTERPRETATION:  CLINICAL INDICATION: Fall, assess left hip fracture.    TECHNIQUE: CT axial images of the left hip were obtained without   intravenous contrast. Coronal and sagittal reformatted images were also   obtained.    CONTRAST/COMPLICATIONS:  IV Contrast: NONE  Complications: None reported at time of study completion    COMPARISON: XR: 11/18/2023. CT: None. MR: None.    FINDINGS: Evaluation of soft tissue is limited without intravenous   contrast.    Bones: Decreased bone mineralization. Acute impacted left femoral neck   fracture with superomedial displacement. No dislocation    Degenerative changes of the visualized left sacroiliac joint, pubic   symphysis and left hip.    Soft tissue: Stranding at the left lateral hip superficial soft tissue,   likely edema/ecchymosis. Stranding at the deep left hip/proximal thigh   soft tissue. Diffuse muscle bulk loss with fatty replacement. Left hip   hemarthrosis. Chondrocalcinosis at the left hip and pubic symphysis.    Additional: Colon diverticulosis. Atherosclerosis.    IMPRESSION:    Acute impacted left femoral neck fracture.    --- End of Report ---    DAVE TENORIO MD; Attending Radiologist  This document has been electronically signed. Nov 20 2023  1:27AM  ACC: [id number] EXAM:  CT CHEST   ORDERED BY: MERA TIPTON     PROCEDURE DATE:  11/18/2023      INTERPRETATION:  HISTORY: Chest wall pain after fall.    EXAMINATION: CT CHEST was performed without IV contrast.    COMPARISON: None available.    FINDINGS:    AIRWAYS, LUNGS, PLEURA: Clear central airways. No consolidation.   Nonspecific right apical 0.6 cm ground-glass nodule with focal 0.3 cm   solid component (image 69, series 2). No pleural effusion.    MEDIASTINUM: Cardiomegaly. No pericardial effusion. Thoracic aorta normal   caliber.  No large mediastinal lymph nodes.    IMAGED ABDOMEN: Right hepatic lobe hypodensities, likely cysts.    SOFT TISSUES: Unremarkable.    BONES: No acute osseous abnormality.    IMPRESSION:.    No evidence of acute thoracic traumatic injury on this noncontrast CT   chest.    Nonspecific right apical 0.6 upper ground-glass nodule with focal 0.3 cm   solid component. Recommend CT chest follow-up in 3 months to assess   stability.    --- End of Report ---     DEJON RAMÍREZ MD; Attending Radiologist  This document has been electronically signed. Nov 18 2023 11:20AM    ORT Score -   Family Hx of substance abuse	Female	      Male  Alcohol 	                                           1                     3  Illegal drugs	                                   2                     3  Rx drugs                                           4 	                  4  Personal Hx of substance abuse		  Alcohol 	                                          3	                  3  Illegal drugs                                     4	                  4  Rx drugs                                            5 	                  5  Age between 16- 45 years	           1                     1  hx preadolescent sexual abuse	   3 	                  0  Psychological disease		  ADD, OCD, bipolar, schizophrenia   2	          2  Depression                                           1 	          1  Total: 0    a score of 3 or lower indicates low risk for opioid abuse		  a score of 4-7 indicates moderate risk for opioid abuse		  a score of 8 or higher indicates high risk for opioid abuse  	  REVIEW OF SYSTEMS:  CONSTITUTIONAL: No fever or fatigue  HEENT:  No difficulty hearing, no change in vision  NECK: No pain or stiffness  RESPIRATORY: No cough, wheezing, chills or hemoptysis; No shortness of breath  CARDIOVASCULAR: No chest pain, palpitations, dizziness, or leg swelling  GASTROINTESTINAL: No loss of appetite, decreased PO intake. No abdominal or epigastric pain. No nausea, vomiting; No diarrhea or constipation.   GENITOURINARY: No dysuria, frequency, hematuria, retention or incontinence  MUSCULOSKELETAL: + Left hip pain and swelling; No muscle, back, pain, no upper muscle strength weakness, no saddle anesthesia, bowel/bladder incontinence, + falls.   NEURO: No headaches, No numbness/tingling b/l LE, No weakness  ENDOCRINE: No polyuria, polydipsia, heat or cold intolerance; No hair loss  PSYCHIATRIC: No depression, anxiety or difficulty sleeping    PHYSICAL EXAM:  GENERAL:  Alert & Oriented X4, cooperative, NAD, Good concentration. Speech is clear.   RESPIRATORY: Respirations even and unlabored. Clear to auscultation bilaterally; No rales, rhonchi, wheezing, or rubs  CARDIOVASCULAR: Normal S1/S2, regular rate and rhythm; No murmurs, rubs, or gallops. No JVD.   GASTROINTESTINAL:  Soft, Nontender, Nondistended; Bowel sounds present. NPO order in place.  PERIPHERAL VASCULAR:  Extremities warm without edema. 2+ Peripheral Pulses, No cyanosis, No calf tenderness  MUSCULOSKELETAL: Motor Strength 5/5 B/L upper and 2/5 lower extremities; moves all extremities equally against gravity; ROM decreased to LLE due to pain; + tenderness on palpation of left hip.  SKIN: Warm, dry, intact. No rashes, lesions, scars or wounds.     Risk factors associated with adverse outcomes related to opioid treatment  [ ]  Concurrent benzodiazepine use  [ ]  History/ Active substance use or alcohol use disorder  [ ] Psychiatric co-morbidity  [ ] Sleep apnea  [ ] COPD  [ ] BMI> 35  [ ] Liver dysfunction  [ ] Renal dysfunction  [ ] CHF  [ ] Smoker  [x]  Age > 60 years    [x]  [place]  Reviewed and Copied to Chart. See below.    Plan of care and goal oriented pain management treatment options were discussed with patient and /or primary care giver; all questions and concerns were addressed and care was aligned with patient's wishes.    Educated patient on goal oriented pain management treatment options        Source of information: CAROL ROY, Chart review  Patient language: English  : n/a    HPI:  Patient is a 77 y/o F with who ambulates independently with  PMH of HTN and carotid stenosis and PSH of appendectomy presented after a fall.   Patient reports that after her usual run in the park she was walking at a slower pace to cool down when she tripped and fell on her left side. Patient reports she did not have any lightheadedness, or dizziness before the fall. Patient also denies hitting her head or losing consciousness. Patient reports that after the fall she had sharp pain in her left hip and left chest. Patient reports after the fall some passerby tried to help him get up but she was not able to stand back up.   Patient denies any recent fevers, chills, weakness, fatigue, malaise, headache, dizziness, lightheadedness, chest pain, palpitations, shortness of breath, cough, nausea, vomiting, abdominal pain, diarrhea, constipation, melena, hematochezia, dysuria, urinary frequency, or urgency. Patient denies any other complains at this time.   Patient reports taking multiple vitamins/supplements like vitamin D, calcium, magnesium, chondroitin sulphate, and glucosamine. Patient denies history of osteoporosis.     [attending addendum: discussed with pt's PMD / cardiologist, Dr Burton, patient with history of a carotid plaque Aortic insufficiency ( moderate) and possible CVA/TIA ( in [place])) but has not had any recent visit or cardiovascular workup.. >> Echo and carotic ordered)]      (18 Nov 2023 14:16)      Pt is admitted for s/p Fall. GOHCO. Left Femoral neck fracture. Pain consulted for left hip pain.  Pt seen and examined at bedside. Reports pain score 5/10 on left hip when at rest and not moving, tolerable. But when moving left leg pain is 7-8/10 not tolerable. SCALE USED: (1-10 VNRS). Pt describes pain as aching and radiating to LLE, alleviated by pain medication and exacerbated by movement. Pt tolerating is NPO today. Denies lethargy, nausea, vomiting, constipation, itchiness. Reports last BM 11/18. Patient stated goal for pain control: to be able to take deep breaths, get out of bed to chair and ambulate with tolerable pain control. Pt reports taking Advil for pain at home. Pt is scheduled for surgery today with MD Espinoza.    PAST MEDICAL & SURGICAL HISTORY:      FAMILY HISTORY:    Social History:  Patient denies smoking, alcohol use, or illicit drug use. (18 Nov 2023 14:16)   [x] Denies ETOH use, illicit drug use and smoking    Allergies    No Known Allergies    Intolerances    MEDICATIONS  (STANDING):  polyethylene glycol 3350 17 Gram(s) Oral daily  povidone iodine 10% Solution 1 Application(s) Topical once  senna 2 Tablet(s) Oral at bedtime    MEDICATIONS  (PRN):  acetaminophen     Tablet .. 650 milliGRAM(s) Oral every 6 hours PRN Temp greater or equal to 38C (100.4F), Mild Pain (1 - 3)  melatonin 3 milliGRAM(s) Oral at bedtime PRN Insomnia  ondansetron Injectable 4 milliGRAM(s) IV Push every 8 hours PRN Nausea and/or Vomiting  oxyCODONE    IR 2.5 milliGRAM(s) Oral every 6 hours PRN Moderate Pain (4 - 6)  oxyCODONE    IR 5 milliGRAM(s) Oral every 6 hours PRN Severe Pain (7 - 10)    Vital Signs Last 24 Hrs  T(C): 37 (20 Nov 2023 05:40), Max: 37.1 (19 Nov 2023 12:25)  T(F): 98.6 (20 Nov 2023 05:40), Max: 98.7 (19 Nov 2023 12:25)  HR: 64 (20 Nov 2023 05:40) (48 - 64)  BP: 156/78 (20 Nov 2023 05:40) (143/80 - 164/74)  BP(mean): --  RR: 17 (20 Nov 2023 05:40) (17 - 18)  SpO2: 98% (20 Nov 2023 05:40) (95% - 98%)    Parameters below as of 20 Nov 2023 05:40  Patient On (Oxygen Delivery Method): room air    LABS: Reviewed.                        13.4   7.27  )-----------( 180      ( 20 Nov 2023 04:58 )             40.2     11-20    135  |  103  |  15  ----------------------------<  99  3.6   |  29  |  0.72    Ca    9.5      20 Nov 2023 04:58  Phos  2.7     11-20  Mg     1.9     11-20      PT/INR - ( 20 Nov 2023 04:58 )   PT: 11.2 sec;   INR: 0.98 ratio       PTT - ( 20 Nov 2023 04:58 )  PTT:28.6 sec    Urinalysis Basic - ( 20 Nov 2023 04:58 )    Color: x / Appearance: x / SG: x / pH: x  Gluc: 99 mg/dL / Ketone: x  / Bili: x / Urobili: x   Blood: x / Protein: x / Nitrite: x   Leuk Esterase: x / RBC: x / WBC x   Sq Epi: x / Non Sq Epi: x / Bacteria: x    CAPILLARY BLOOD GLUCOSE    Radiology: Reviewed.     ACC: [id number] EXAM:  CT HIP ONLY LT   ORDERED BY: FRITZ TORRES     PROCEDURE DATE:  11/20/2023          INTERPRETATION:  CLINICAL INDICATION: Fall, assess left hip fracture.    TECHNIQUE: CT axial images of the left hip were obtained without   intravenous contrast. Coronal and sagittal reformatted images were also   obtained.    CONTRAST/COMPLICATIONS:  IV Contrast: NONE  Complications: None reported at time of study completion    COMPARISON: XR: 11/18/2023. CT: None. MR: None.    FINDINGS: Evaluation of soft tissue is limited without intravenous   contrast.    Bones: Decreased bone mineralization. Acute impacted left femoral neck   fracture with superomedial displacement. No dislocation    Degenerative changes of the visualized left sacroiliac joint, pubic   symphysis and left hip.    Soft tissue: Stranding at the left lateral hip superficial soft tissue,   likely edema/ecchymosis. Stranding at the deep left hip/proximal thigh   soft tissue. Diffuse muscle bulk loss with fatty replacement. Left hip   hemarthrosis. Chondrocalcinosis at the left hip and pubic symphysis.    Additional: Colon diverticulosis. Atherosclerosis.    IMPRESSION:    Acute impacted left femoral neck fracture.    --- End of Report ---    DAVE TENORIO MD; Attending Radiologist  This document has been electronically signed. Nov 20 2023  1:27AM  ACC: [id number] EXAM:  CT CHEST   ORDERED BY: MERA TIPTON     PROCEDURE DATE:  11/18/2023      INTERPRETATION:  HISTORY: Chest wall pain after fall.    EXAMINATION: CT CHEST was performed without IV contrast.    COMPARISON: None available.    FINDINGS:    AIRWAYS, LUNGS, PLEURA: Clear central airways. No consolidation.   Nonspecific right apical 0.6 cm ground-glass nodule with focal 0.3 cm   solid component (image 69, series 2). No pleural effusion.    MEDIASTINUM: Cardiomegaly. No pericardial effusion. Thoracic aorta normal   caliber.  No large mediastinal lymph nodes.    IMAGED ABDOMEN: Right hepatic lobe hypodensities, likely cysts.    SOFT TISSUES: Unremarkable.    BONES: No acute osseous abnormality.    IMPRESSION:.    No evidence of acute thoracic traumatic injury on this noncontrast CT   chest.    Nonspecific right apical 0.6 upper ground-glass nodule with focal 0.3 cm   solid component. Recommend CT chest follow-up in 3 months to assess   stability.    --- End of Report ---     DEJON RAMÍREZ MD; Attending Radiologist  This document has been electronically signed. Nov 18 2023 11:20AM    ORT Score -   Family Hx of substance abuse	Female	      Male  Alcohol 	                                           1                     3  Illegal drugs	                                   2                     3  Rx drugs                                           4 	                  4  Personal Hx of substance abuse		  Alcohol 	                                          3	                  3  Illegal drugs                                     4	                  4  Rx drugs                                            5 	                  5  Age between 16- 45 years	           1                     1  hx preadolescent sexual abuse	   3 	                  0  Psychological disease		  ADD, OCD, bipolar, schizophrenia   2	          2  Depression                                           1 	          1  Total: 0    a score of 3 or lower indicates low risk for opioid abuse		  a score of 4-7 indicates moderate risk for opioid abuse		  a score of 8 or higher indicates high risk for opioid abuse  	  REVIEW OF SYSTEMS:  CONSTITUTIONAL: No fever or fatigue  HEENT:  No difficulty hearing, no change in vision  NECK: No pain or stiffness  RESPIRATORY: No cough, wheezing, chills or hemoptysis; No shortness of breath  CARDIOVASCULAR: No chest pain, palpitations, dizziness, or leg swelling  GASTROINTESTINAL: No loss of appetite, decreased PO intake. No abdominal or epigastric pain. No nausea, vomiting; No diarrhea or constipation.   GENITOURINARY: No dysuria, frequency, hematuria, retention or incontinence  MUSCULOSKELETAL: + Left hip pain and swelling; No muscle, back, pain, no upper muscle strength weakness, no saddle anesthesia, bowel/bladder incontinence, + falls.   NEURO: No headaches, No numbness/tingling b/l LE, No weakness  ENDOCRINE: No polyuria, polydipsia, heat or cold intolerance; No hair loss  PSYCHIATRIC: No depression, anxiety or difficulty sleeping    PHYSICAL EXAM:  GENERAL:  Alert & Oriented X4, cooperative, NAD, Good concentration. Speech is clear.   RESPIRATORY: Respirations even and unlabored. Clear to auscultation bilaterally; No rales, rhonchi, wheezing, or rubs  CARDIOVASCULAR: Normal S1/S2, regular rate and rhythm; No murmurs, rubs, or gallops. No JVD.   GASTROINTESTINAL:  Soft, Nontender, Nondistended; Bowel sounds present. NPO order in place.  PERIPHERAL VASCULAR:  Extremities warm without edema. 2+ Peripheral Pulses, No cyanosis, No calf tenderness  MUSCULOSKELETAL: Motor Strength 5/5 B/L upper and 2/5 lower extremities; moves all extremities equally against gravity; ROM decreased to LLE due to pain; + tenderness on palpation of left hip.  SKIN: Warm, dry, intact. No rashes, lesions, scars or wounds.     Risk factors associated with adverse outcomes related to opioid treatment  [ ]  Concurrent benzodiazepine use  [ ]  History/ Active substance use or alcohol use disorder  [ ] Psychiatric co-morbidity  [ ] Sleep apnea  [ ] COPD  [ ] BMI> 35  [ ] Liver dysfunction  [ ] Renal dysfunction  [ ] CHF  [ ] Smoker  [x]  Age > 60 years    [x]  [place]  Reviewed and Copied to Chart. See below.    Plan of care and goal oriented pain management treatment options were discussed with patient and /or primary care giver; all questions and concerns were addressed and care was aligned with patient's wishes.    Educated patient on goal oriented pain management treatment options        Source of information: CAROL ROY, Chart review  Patient language: English  : n/a    HPI:  Patient is a 79 y/o F with who ambulates independently with  PMH of HTN and carotid stenosis and PSH of appendectomy presented after a fall.   Patient reports that after her usual run in the park she was walking at a slower pace to cool down when she tripped and fell on her left side. Patient reports she did not have any lightheadedness, or dizziness before the fall. Patient also denies hitting her head or losing consciousness. Patient reports that after the fall she had sharp pain in her left hip and left chest. Patient reports after the fall some passerby tried to help him get up but she was not able to stand back up.   Patient denies any recent fevers, chills, weakness, fatigue, malaise, headache, dizziness, lightheadedness, chest pain, palpitations, shortness of breath, cough, nausea, vomiting, abdominal pain, diarrhea, constipation, melena, hematochezia, dysuria, urinary frequency, or urgency. Patient denies any other complains at this time.   Patient reports taking multiple vitamins/supplements like vitamin D, calcium, magnesium, chondroitin sulphate, and glucosamine. Patient denies history of osteoporosis.     [attending addendum: discussed with pt's PMD / cardiologist, Dr Burton, patient with history of a carotid plaque Aortic insufficiency ( moderate) and possible CVA/TIA ( in [place])) but has not had any recent visit or cardiovascular workup.. >> Echo and carotic ordered)]      (18 Nov 2023 14:16)      Pt is admitted for s/p Fall. GOHCO. Left Femoral neck fracture. Pain consulted for left hip pain.  Pt seen and examined at bedside. Reports pain score 5/10 on left hip when at rest and not moving, tolerable. But when moving left leg pain is 7-8/10 not tolerable. SCALE USED: (1-10 VNRS). Pt describes pain as aching and radiating to LLE, alleviated by pain medication and exacerbated by movement. Pt tolerating is NPO today. Denies lethargy, nausea, vomiting, constipation, itchiness. Reports last BM 11/18. Patient stated goal for pain control: to be able to take deep breaths, get out of bed to chair and ambulate with tolerable pain control. Pt reports taking Advil for pain at home. Pt is scheduled for surgery today with MD Espinoza. Procedure:  Left hip hemiarthroplasty      PAST MEDICAL & SURGICAL HISTORY:      FAMILY HISTORY:    Social History:  Patient denies smoking, alcohol use, or illicit drug use. (18 Nov 2023 14:16)   [x] Denies ETOH use, illicit drug use and smoking    Allergies    No Known Allergies    Intolerances    MEDICATIONS  (STANDING):  polyethylene glycol 3350 17 Gram(s) Oral daily  povidone iodine 10% Solution 1 Application(s) Topical once  senna 2 Tablet(s) Oral at bedtime    MEDICATIONS  (PRN):  acetaminophen     Tablet .. 650 milliGRAM(s) Oral every 6 hours PRN Temp greater or equal to 38C (100.4F), Mild Pain (1 - 3)  melatonin 3 milliGRAM(s) Oral at bedtime PRN Insomnia  ondansetron Injectable 4 milliGRAM(s) IV Push every 8 hours PRN Nausea and/or Vomiting  oxyCODONE    IR 2.5 milliGRAM(s) Oral every 6 hours PRN Moderate Pain (4 - 6)  oxyCODONE    IR 5 milliGRAM(s) Oral every 6 hours PRN Severe Pain (7 - 10)    Vital Signs Last 24 Hrs  T(C): 37 (20 Nov 2023 05:40), Max: 37.1 (19 Nov 2023 12:25)  T(F): 98.6 (20 Nov 2023 05:40), Max: 98.7 (19 Nov 2023 12:25)  HR: 64 (20 Nov 2023 05:40) (48 - 64)  BP: 156/78 (20 Nov 2023 05:40) (143/80 - 164/74)  BP(mean): --  RR: 17 (20 Nov 2023 05:40) (17 - 18)  SpO2: 98% (20 Nov 2023 05:40) (95% - 98%)    Parameters below as of 20 Nov 2023 05:40  Patient On (Oxygen Delivery Method): room air    LABS: Reviewed.                        13.4   7.27  )-----------( 180      ( 20 Nov 2023 04:58 )             40.2     11-20    135  |  103  |  15  ----------------------------<  99  3.6   |  29  |  0.72    Ca    9.5      20 Nov 2023 04:58  Phos  2.7     11-20  Mg     1.9     11-20      PT/INR - ( 20 Nov 2023 04:58 )   PT: 11.2 sec;   INR: 0.98 ratio       PTT - ( 20 Nov 2023 04:58 )  PTT:28.6 sec    Urinalysis Basic - ( 20 Nov 2023 04:58 )    Color: x / Appearance: x / SG: x / pH: x  Gluc: 99 mg/dL / Ketone: x  / Bili: x / Urobili: x   Blood: x / Protein: x / Nitrite: x   Leuk Esterase: x / RBC: x / WBC x   Sq Epi: x / Non Sq Epi: x / Bacteria: x    CAPILLARY BLOOD GLUCOSE    Radiology: Reviewed.     ACC: [id number] EXAM:  CT HIP ONLY LT   ORDERED BY: FRITZ TORRES     PROCEDURE DATE:  11/20/2023          INTERPRETATION:  CLINICAL INDICATION: Fall, assess left hip fracture.    TECHNIQUE: CT axial images of the left hip were obtained without   intravenous contrast. Coronal and sagittal reformatted images were also   obtained.    CONTRAST/COMPLICATIONS:  IV Contrast: NONE  Complications: None reported at time of study completion    COMPARISON: XR: 11/18/2023. CT: None. MR: None.    FINDINGS: Evaluation of soft tissue is limited without intravenous   contrast.    Bones: Decreased bone mineralization. Acute impacted left femoral neck   fracture with superomedial displacement. No dislocation    Degenerative changes of the visualized left sacroiliac joint, pubic   symphysis and left hip.    Soft tissue: Stranding at the left lateral hip superficial soft tissue,   likely edema/ecchymosis. Stranding at the deep left hip/proximal thigh   soft tissue. Diffuse muscle bulk loss with fatty replacement. Left hip   hemarthrosis. Chondrocalcinosis at the left hip and pubic symphysis.    Additional: Colon diverticulosis. Atherosclerosis.    IMPRESSION:    Acute impacted left femoral neck fracture.    --- End of Report ---    DAVE TENORIO MD; Attending Radiologist  This document has been electronically signed. Nov 20 2023  1:27AM  ACC: [id number] EXAM:  CT CHEST   ORDERED BY: MERA TIPTON     PROCEDURE DATE:  11/18/2023      INTERPRETATION:  HISTORY: Chest wall pain after fall.    EXAMINATION: CT CHEST was performed without IV contrast.    COMPARISON: None available.    FINDINGS:    AIRWAYS, LUNGS, PLEURA: Clear central airways. No consolidation.   Nonspecific right apical 0.6 cm ground-glass nodule with focal 0.3 cm   solid component (image 69, series 2). No pleural effusion.    MEDIASTINUM: Cardiomegaly. No pericardial effusion. Thoracic aorta normal   caliber.  No large mediastinal lymph nodes.    IMAGED ABDOMEN: Right hepatic lobe hypodensities, likely cysts.    SOFT TISSUES: Unremarkable.    BONES: No acute osseous abnormality.    IMPRESSION:.    No evidence of acute thoracic traumatic injury on this noncontrast CT   chest.    Nonspecific right apical 0.6 upper ground-glass nodule with focal 0.3 cm   solid component. Recommend CT chest follow-up in 3 months to assess   stability.    --- End of Report ---     DEJON RAMÍREZ MD; Attending Radiologist  This document has been electronically signed. Nov 18 2023 11:20AM    ORT Score -   Family Hx of substance abuse	Female	      Male  Alcohol 	                                           1                     3  Illegal drugs	                                   2                     3  Rx drugs                                           4 	                  4  Personal Hx of substance abuse		  Alcohol 	                                          3	                  3  Illegal drugs                                     4	                  4  Rx drugs                                            5 	                  5  Age between 16- 45 years	           1                     1  hx preadolescent sexual abuse	   3 	                  0  Psychological disease		  ADD, OCD, bipolar, schizophrenia   2	          2  Depression                                           1 	          1  Total: 0    a score of 3 or lower indicates low risk for opioid abuse		  a score of 4-7 indicates moderate risk for opioid abuse		  a score of 8 or higher indicates high risk for opioid abuse  	  REVIEW OF SYSTEMS:  CONSTITUTIONAL: No fever or fatigue  HEENT:  No difficulty hearing, no change in vision  NECK: No pain or stiffness  RESPIRATORY: No cough, wheezing, chills or hemoptysis; No shortness of breath  CARDIOVASCULAR: No chest pain, palpitations, dizziness, or leg swelling  GASTROINTESTINAL: No loss of appetite, decreased PO intake. No abdominal or epigastric pain. No nausea, vomiting; No diarrhea or constipation.   GENITOURINARY: No dysuria, frequency, hematuria, retention or incontinence  MUSCULOSKELETAL: + Left hip pain and swelling; No muscle, back, pain, no upper muscle strength weakness, no saddle anesthesia, bowel/bladder incontinence, + falls.   NEURO: No headaches, No numbness/tingling b/l LE, No weakness  ENDOCRINE: No polyuria, polydipsia, heat or cold intolerance; No hair loss  PSYCHIATRIC: No depression, anxiety or difficulty sleeping    PHYSICAL EXAM:  GENERAL:  Alert & Oriented X4, cooperative, NAD, Good concentration. Speech is clear.   RESPIRATORY: Respirations even and unlabored. Clear to auscultation bilaterally; No rales, rhonchi, wheezing, or rubs  CARDIOVASCULAR: Normal S1/S2, regular rate and rhythm; No murmurs, rubs, or gallops. No JVD.   GASTROINTESTINAL:  Soft, Nontender, Nondistended; Bowel sounds present. NPO order in place.  PERIPHERAL VASCULAR:  Extremities warm without edema. 2+ Peripheral Pulses, No cyanosis, No calf tenderness  MUSCULOSKELETAL: Motor Strength 5/5 B/L upper and 2/5 lower extremities; moves all extremities equally against gravity; ROM decreased to LLE due to pain; + tenderness on palpation of left hip.  SKIN: Warm, dry, intact. No rashes, lesions, scars or wounds.     Risk factors associated with adverse outcomes related to opioid treatment  [ ]  Concurrent benzodiazepine use  [ ]  History/ Active substance use or alcohol use disorder  [ ] Psychiatric co-morbidity  [ ] Sleep apnea  [ ] COPD  [ ] BMI> 35  [ ] Liver dysfunction  [ ] Renal dysfunction  [ ] CHF  [ ] Smoker  [x]  Age > 60 years    [x]  [place]  Reviewed and Copied to Chart. See below.    Plan of care and goal oriented pain management treatment options were discussed with patient and /or primary care giver; all questions and concerns were addressed and care was aligned with patient's wishes.    Educated patient on goal oriented pain management treatment options        Source of information: CAROL ROY, Chart review  Patient language: English  : n/a    HPI:  Patient is a 79 y/o F with who ambulates independently with  PMH of HTN and carotid stenosis and PSH of appendectomy presented after a fall.   Patient reports that after her usual run in the park she was walking at a slower pace to cool down when she tripped and fell on her left side. Patient reports she did not have any lightheadedness, or dizziness before the fall. Patient also denies hitting her head or losing consciousness. Patient reports that after the fall she had sharp pain in her left hip and left chest. Patient reports after the fall some passerby tried to help him get up but she was not able to stand back up.   Patient denies any recent fevers, chills, weakness, fatigue, malaise, headache, dizziness, lightheadedness, chest pain, palpitations, shortness of breath, cough, nausea, vomiting, abdominal pain, diarrhea, constipation, melena, hematochezia, dysuria, urinary frequency, or urgency. Patient denies any other complains at this time.   Patient reports taking multiple vitamins/supplements like vitamin D, calcium, magnesium, chondroitin sulphate, and glucosamine. Patient denies history of osteoporosis.     [attending addendum: discussed with pt's PMD / cardiologist, Dr Burton, patient with history of a carotid plaque Aortic insufficiency ( moderate) and possible CVA/TIA ( in [place])) but has not had any recent visit or cardiovascular workup.. >> Echo and carotic ordered)]      (18 Nov 2023 14:16)      Pt is admitted for s/p Fall. GOHCO. Left Femoral neck fracture. Pain consulted for left hip pain.  Pt seen and examined at bedside. Reports pain score 5/10 on left hip when at rest and not moving, tolerable. But when moving left leg pain is 7-8/10 not tolerable. SCALE USED: (1-10 VNRS). Pt describes pain as aching and radiating to LLE, alleviated by pain medication and exacerbated by movement. Pt tolerating is NPO today. Denies lethargy, nausea, vomiting, constipation, itchiness. Reports last BM 11/18. Patient stated goal for pain control: to be able to take deep breaths, get out of bed to chair and ambulate with tolerable pain control. Pt reports taking Advil for pain at home. Pt is scheduled for surgery today with MD Espinoza. Procedure:  Left hip hemiarthroplasty      PAST MEDICAL & SURGICAL HISTORY:      FAMILY HISTORY:    Social History:  Patient denies smoking, alcohol use, or illicit drug use. (18 Nov 2023 14:16)   [x] Denies ETOH use, illicit drug use and smoking    Allergies    No Known Allergies    Intolerances    MEDICATIONS  (STANDING):  polyethylene glycol 3350 17 Gram(s) Oral daily  povidone iodine 10% Solution 1 Application(s) Topical once  senna 2 Tablet(s) Oral at bedtime    MEDICATIONS  (PRN):  acetaminophen     Tablet .. 650 milliGRAM(s) Oral every 6 hours PRN Temp greater or equal to 38C (100.4F), Mild Pain (1 - 3)  melatonin 3 milliGRAM(s) Oral at bedtime PRN Insomnia  ondansetron Injectable 4 milliGRAM(s) IV Push every 8 hours PRN Nausea and/or Vomiting  oxyCODONE    IR 2.5 milliGRAM(s) Oral every 6 hours PRN Moderate Pain (4 - 6)  oxyCODONE    IR 5 milliGRAM(s) Oral every 6 hours PRN Severe Pain (7 - 10)    Vital Signs Last 24 Hrs  T(C): 37 (20 Nov 2023 05:40), Max: 37.1 (19 Nov 2023 12:25)  T(F): 98.6 (20 Nov 2023 05:40), Max: 98.7 (19 Nov 2023 12:25)  HR: 64 (20 Nov 2023 05:40) (48 - 64)  BP: 156/78 (20 Nov 2023 05:40) (143/80 - 164/74)  BP(mean): --  RR: 17 (20 Nov 2023 05:40) (17 - 18)  SpO2: 98% (20 Nov 2023 05:40) (95% - 98%)    Parameters below as of 20 Nov 2023 05:40  Patient On (Oxygen Delivery Method): room air    LABS: Reviewed.                        13.4   7.27  )-----------( 180      ( 20 Nov 2023 04:58 )             40.2     11-20    135  |  103  |  15  ----------------------------<  99  3.6   |  29  |  0.72    Ca    9.5      20 Nov 2023 04:58  Phos  2.7     11-20  Mg     1.9     11-20      PT/INR - ( 20 Nov 2023 04:58 )   PT: 11.2 sec;   INR: 0.98 ratio       PTT - ( 20 Nov 2023 04:58 )  PTT:28.6 sec    Urinalysis Basic - ( 20 Nov 2023 04:58 )    Color: x / Appearance: x / SG: x / pH: x  Gluc: 99 mg/dL / Ketone: x  / Bili: x / Urobili: x   Blood: x / Protein: x / Nitrite: x   Leuk Esterase: x / RBC: x / WBC x   Sq Epi: x / Non Sq Epi: x / Bacteria: x    CAPILLARY BLOOD GLUCOSE    Radiology: Reviewed.     ACC: [id number] EXAM:  CT HIP ONLY LT   ORDERED BY: FRITZ TORRES     PROCEDURE DATE:  11/20/2023          INTERPRETATION:  CLINICAL INDICATION: Fall, assess left hip fracture.    TECHNIQUE: CT axial images of the left hip were obtained without   intravenous contrast. Coronal and sagittal reformatted images were also   obtained.    CONTRAST/COMPLICATIONS:  IV Contrast: NONE  Complications: None reported at time of study completion    COMPARISON: XR: 11/18/2023. CT: None. MR: None.    FINDINGS: Evaluation of soft tissue is limited without intravenous   contrast.    Bones: Decreased bone mineralization. Acute impacted left femoral neck   fracture with superomedial displacement. No dislocation    Degenerative changes of the visualized left sacroiliac joint, pubic   symphysis and left hip.    Soft tissue: Stranding at the left lateral hip superficial soft tissue,   likely edema/ecchymosis. Stranding at the deep left hip/proximal thigh   soft tissue. Diffuse muscle bulk loss with fatty replacement. Left hip   hemarthrosis. Chondrocalcinosis at the left hip and pubic symphysis.    Additional: Colon diverticulosis. Atherosclerosis.    IMPRESSION:    Acute impacted left femoral neck fracture.    --- End of Report ---    DAVE TENORIO MD; Attending Radiologist  This document has been electronically signed. Nov 20 2023  1:27AM  ACC: [id number] EXAM:  CT CHEST   ORDERED BY: MEAR TIPTON     PROCEDURE DATE:  11/18/2023      INTERPRETATION:  HISTORY: Chest wall pain after fall.    EXAMINATION: CT CHEST was performed without IV contrast.    COMPARISON: None available.    FINDINGS:    AIRWAYS, LUNGS, PLEURA: Clear central airways. No consolidation.   Nonspecific right apical 0.6 cm ground-glass nodule with focal 0.3 cm   solid component (image 69, series 2). No pleural effusion.    MEDIASTINUM: Cardiomegaly. No pericardial effusion. Thoracic aorta normal   caliber.  No large mediastinal lymph nodes.    IMAGED ABDOMEN: Right hepatic lobe hypodensities, likely cysts.    SOFT TISSUES: Unremarkable.    BONES: No acute osseous abnormality.    IMPRESSION:.    No evidence of acute thoracic traumatic injury on this noncontrast CT   chest.    Nonspecific right apical 0.6 upper ground-glass nodule with focal 0.3 cm   solid component. Recommend CT chest follow-up in 3 months to assess   stability.    --- End of Report ---     DEJON RAMÍREZ MD; Attending Radiologist  This document has been electronically signed. Nov 18 2023 11:20AM    ORT Score -   Family Hx of substance abuse	Female	      Male  Alcohol 	                                           1                     3  Illegal drugs	                                   2                     3  Rx drugs                                           4 	                  4  Personal Hx of substance abuse		  Alcohol 	                                          3	                  3  Illegal drugs                                     4	                  4  Rx drugs                                            5 	                  5  Age between 16- 45 years	           1                     1  hx preadolescent sexual abuse	   3 	                  0  Psychological disease		  ADD, OCD, bipolar, schizophrenia   2	          2  Depression                                           1 	          1  Total: 0    a score of 3 or lower indicates low risk for opioid abuse		  a score of 4-7 indicates moderate risk for opioid abuse		  a score of 8 or higher indicates high risk for opioid abuse  	  REVIEW OF SYSTEMS:  CONSTITUTIONAL: No fever or fatigue  HEENT:  No difficulty hearing, no change in vision  NECK: No pain or stiffness  RESPIRATORY: No cough, wheezing, chills or hemoptysis; No shortness of breath  CARDIOVASCULAR: No chest pain, palpitations, dizziness, or leg swelling  GASTROINTESTINAL: No loss of appetite, decreased PO intake. No abdominal or epigastric pain. No nausea, vomiting; No diarrhea or constipation.   GENITOURINARY: No dysuria, frequency, hematuria, retention or incontinence  MUSCULOSKELETAL: + Left hip pain and swelling; No muscle, back, pain, no upper muscle strength weakness, no saddle anesthesia, bowel/bladder incontinence, + falls.   NEURO: No headaches, No numbness/tingling b/l LE, No weakness  ENDOCRINE: No polyuria, polydipsia, heat or cold intolerance; No hair loss  PSYCHIATRIC: No depression, anxiety or difficulty sleeping    PHYSICAL EXAM:  GENERAL:  Alert & Oriented X4, cooperative, NAD, Good concentration. Speech is clear.   RESPIRATORY: Respirations even and unlabored. Clear to auscultation bilaterally; No rales, rhonchi, wheezing, or rubs  CARDIOVASCULAR: Normal S1/S2, regular rate and rhythm; No murmurs, rubs, or gallops. No JVD.   GASTROINTESTINAL:  Soft, Nontender, Nondistended; Bowel sounds present. NPO order in place.  PERIPHERAL VASCULAR:  Extremities warm without edema. 2+ Peripheral Pulses, No cyanosis, No calf tenderness  MUSCULOSKELETAL: Motor Strength 5/5 B/L upper and 2/5 lower extremities; moves all extremities equally against gravity; ROM decreased to LLE due to pain; + tenderness on palpation of left hip.  SKIN: Warm, dry, intact. No rashes, lesions, scars or wounds.     Risk factors associated with adverse outcomes related to opioid treatment  [ ]  Concurrent benzodiazepine use  [ ]  History/ Active substance use or alcohol use disorder  [ ] Psychiatric co-morbidity  [ ] Sleep apnea  [ ] COPD  [ ] BMI> 35  [ ] Liver dysfunction  [ ] Renal dysfunction  [ ] CHF  [ ] Smoker  [x]  Age > 60 years    [x]  [place]  Reviewed and Copied to Chart. See below.    Plan of care and goal oriented pain management treatment options were discussed with patient and /or primary care giver; all questions and concerns were addressed and care was aligned with patient's wishes.    Educated patient on goal oriented pain management treatment options        Source of information: CAROL ROY, Chart review  Patient language: English  : n/a    HPI:  Patient is a 77 y/o F with who ambulates independently with  PMH of HTN and carotid stenosis and PSH of appendectomy presented after a fall.   Patient reports that after her usual run in the park she was walking at a slower pace to cool down when she tripped and fell on her left side. Patient reports she did not have any lightheadedness, or dizziness before the fall. Patient also denies hitting her head or losing consciousness. Patient reports that after the fall she had sharp pain in her left hip and left chest. Patient reports after the fall some passerby tried to help him get up but she was not able to stand back up.   Patient denies any recent fevers, chills, weakness, fatigue, malaise, headache, dizziness, lightheadedness, chest pain, palpitations, shortness of breath, cough, nausea, vomiting, abdominal pain, diarrhea, constipation, melena, hematochezia, dysuria, urinary frequency, or urgency. Patient denies any other complains at this time.   Patient reports taking multiple vitamins/supplements like vitamin D, calcium, magnesium, chondroitin sulphate, and glucosamine. Patient denies history of osteoporosis.     [attending addendum: discussed with pt's PMD / cardiologist, Dr Burton, patient with history of a carotid plaque Aortic insufficiency ( moderate) and possible CVA/TIA ( in [place])) but has not had any recent visit or cardiovascular workup.. >> Echo and carotic ordered)]      (18 Nov 2023 14:16)      Pt is admitted for s/p Fall. GOHCO. Left Femoral neck fracture. Pain consulted for left hip pain.  Pt seen and examined at bedside. Reports pain score 5/10 on left hip when at rest and not moving, tolerable. But when moving left leg pain is 7-8/10 not tolerable. SCALE USED: (1-10 VNRS). Pt describes pain as aching and radiating to LLE, alleviated by pain medication and exacerbated by movement. Pt tolerating is NPO today. Denies lethargy, nausea, vomiting, constipation, itchiness. Reports last BM 11/18. Patient stated goal for pain control: to be able to take deep breaths, get out of bed to chair and ambulate with tolerable pain control. Pt reports taking Advil for pain at home. Pt is scheduled for surgery today with MD Espinoza. Procedure:  Left hip hemiarthroplasty      PAST MEDICAL & SURGICAL HISTORY:      FAMILY HISTORY:    Social History:  Patient denies smoking, alcohol use, or illicit drug use. (18 Nov 2023 14:16)   [x] Denies ETOH use, illicit drug use and smoking    Allergies    No Known Allergies    Intolerances    MEDICATIONS  (STANDING):  polyethylene glycol 3350 17 Gram(s) Oral daily  povidone iodine 10% Solution 1 Application(s) Topical once  senna 2 Tablet(s) Oral at bedtime    MEDICATIONS  (PRN):  acetaminophen     Tablet .. 650 milliGRAM(s) Oral every 6 hours PRN Temp greater or equal to 38C (100.4F), Mild Pain (1 - 3)  melatonin 3 milliGRAM(s) Oral at bedtime PRN Insomnia  ondansetron Injectable 4 milliGRAM(s) IV Push every 8 hours PRN Nausea and/or Vomiting  oxyCODONE    IR 2.5 milliGRAM(s) Oral every 6 hours PRN Moderate Pain (4 - 6)  oxyCODONE    IR 5 milliGRAM(s) Oral every 6 hours PRN Severe Pain (7 - 10)    Vital Signs Last 24 Hrs  T(C): 37 (20 Nov 2023 05:40), Max: 37.1 (19 Nov 2023 12:25)  T(F): 98.6 (20 Nov 2023 05:40), Max: 98.7 (19 Nov 2023 12:25)  HR: 64 (20 Nov 2023 05:40) (48 - 64)  BP: 156/78 (20 Nov 2023 05:40) (143/80 - 164/74)  BP(mean): --  RR: 17 (20 Nov 2023 05:40) (17 - 18)  SpO2: 98% (20 Nov 2023 05:40) (95% - 98%)    Parameters below as of 20 Nov 2023 05:40  Patient On (Oxygen Delivery Method): room air    LABS: Reviewed.                        13.4   7.27  )-----------( 180      ( 20 Nov 2023 04:58 )             40.2     11-20    135  |  103  |  15  ----------------------------<  99  3.6   |  29  |  0.72    Ca    9.5      20 Nov 2023 04:58  Phos  2.7     11-20  Mg     1.9     11-20      PT/INR - ( 20 Nov 2023 04:58 )   PT: 11.2 sec;   INR: 0.98 ratio       PTT - ( 20 Nov 2023 04:58 )  PTT:28.6 sec    Urinalysis Basic - ( 20 Nov 2023 04:58 )    Color: x / Appearance: x / SG: x / pH: x  Gluc: 99 mg/dL / Ketone: x  / Bili: x / Urobili: x   Blood: x / Protein: x / Nitrite: x   Leuk Esterase: x / RBC: x / WBC x   Sq Epi: x / Non Sq Epi: x / Bacteria: x    CAPILLARY BLOOD GLUCOSE    Radiology: Reviewed.     ACC: [id number] EXAM:  CT HIP ONLY LT   ORDERED BY: FRITZ TORRES     PROCEDURE DATE:  11/20/2023          INTERPRETATION:  CLINICAL INDICATION: Fall, assess left hip fracture.    TECHNIQUE: CT axial images of the left hip were obtained without   intravenous contrast. Coronal and sagittal reformatted images were also   obtained.    CONTRAST/COMPLICATIONS:  IV Contrast: NONE  Complications: None reported at time of study completion    COMPARISON: XR: 11/18/2023. CT: None. MR: None.    FINDINGS: Evaluation of soft tissue is limited without intravenous   contrast.    Bones: Decreased bone mineralization. Acute impacted left femoral neck   fracture with superomedial displacement. No dislocation    Degenerative changes of the visualized left sacroiliac joint, pubic   symphysis and left hip.    Soft tissue: Stranding at the left lateral hip superficial soft tissue,   likely edema/ecchymosis. Stranding at the deep left hip/proximal thigh   soft tissue. Diffuse muscle bulk loss with fatty replacement. Left hip   hemarthrosis. Chondrocalcinosis at the left hip and pubic symphysis.    Additional: Colon diverticulosis. Atherosclerosis.    IMPRESSION:    Acute impacted left femoral neck fracture.    --- End of Report ---    DAVE TENORIO MD; Attending Radiologist  This document has been electronically signed. Nov 20 2023  1:27AM  ACC: [id number] EXAM:  CT CHEST   ORDERED BY: MERA TIPTON     PROCEDURE DATE:  11/18/2023      INTERPRETATION:  HISTORY: Chest wall pain after fall.    EXAMINATION: CT CHEST was performed without IV contrast.    COMPARISON: None available.    FINDINGS:    AIRWAYS, LUNGS, PLEURA: Clear central airways. No consolidation.   Nonspecific right apical 0.6 cm ground-glass nodule with focal 0.3 cm   solid component (image 69, series 2). No pleural effusion.    MEDIASTINUM: Cardiomegaly. No pericardial effusion. Thoracic aorta normal   caliber.  No large mediastinal lymph nodes.    IMAGED ABDOMEN: Right hepatic lobe hypodensities, likely cysts.    SOFT TISSUES: Unremarkable.    BONES: No acute osseous abnormality.    IMPRESSION:.    No evidence of acute thoracic traumatic injury on this noncontrast CT   chest.    Nonspecific right apical 0.6 upper ground-glass nodule with focal 0.3 cm   solid component. Recommend CT chest follow-up in 3 months to assess   stability.    --- End of Report ---     DEJON RAMÍREZ MD; Attending Radiologist  This document has been electronically signed. Nov 18 2023 11:20AM    ORT Score -   Family Hx of substance abuse	Female	      Male  Alcohol 	                                           1                     3  Illegal drugs	                                   2                     3  Rx drugs                                           4 	                  4  Personal Hx of substance abuse		  Alcohol 	                                          3	                  3  Illegal drugs                                     4	                  4  Rx drugs                                            5 	                  5  Age between 16- 45 years	           1                     1  hx preadolescent sexual abuse	   3 	                  0  Psychological disease		  ADD, OCD, bipolar, schizophrenia   2	          2  Depression                                           1 	          1  Total: 0    a score of 3 or lower indicates low risk for opioid abuse		  a score of 4-7 indicates moderate risk for opioid abuse		  a score of 8 or higher indicates high risk for opioid abuse  	  REVIEW OF SYSTEMS:  CONSTITUTIONAL: No fever or fatigue  HEENT:  No difficulty hearing, no change in vision  NECK: No pain or stiffness  RESPIRATORY: No cough, wheezing, chills or hemoptysis; No shortness of breath  CARDIOVASCULAR: No chest pain, palpitations, dizziness, or leg swelling  GASTROINTESTINAL: No loss of appetite, decreased PO intake. No abdominal or epigastric pain. No nausea, vomiting; No diarrhea or constipation.   GENITOURINARY: No dysuria, frequency, hematuria, retention or incontinence  MUSCULOSKELETAL: + Left hip pain and swelling; No muscle, back, pain, no upper muscle strength weakness, no saddle anesthesia, bowel/bladder incontinence, + falls.   NEURO: No headaches, No numbness/tingling b/l LE, No weakness  ENDOCRINE: No polyuria, polydipsia, heat or cold intolerance; No hair loss  PSYCHIATRIC: No depression, anxiety or difficulty sleeping    PHYSICAL EXAM:  GENERAL:  Alert & Oriented X4, cooperative, NAD, Good concentration. Speech is clear.   RESPIRATORY: Respirations even and unlabored. Clear to auscultation bilaterally; No rales, rhonchi, wheezing, or rubs  CARDIOVASCULAR: Normal S1/S2, regular rate and rhythm; No murmurs, rubs, or gallops. No JVD.   GASTROINTESTINAL:  Soft, Nontender, Nondistended; Bowel sounds present. NPO order in place.  PERIPHERAL VASCULAR:  Extremities warm without edema. 2+ Peripheral Pulses, No cyanosis, No calf tenderness  MUSCULOSKELETAL: Motor Strength 5/5 B/L upper and 2/5 lower extremities; moves all extremities equally against gravity; ROM decreased to LLE due to pain; + tenderness on palpation of left hip.  SKIN: Warm, dry, intact. No rashes, lesions, scars or wounds.     Risk factors associated with adverse outcomes related to opioid treatment  [ ]  Concurrent benzodiazepine use  [ ]  History/ Active substance use or alcohol use disorder  [ ] Psychiatric co-morbidity  [ ] Sleep apnea  [ ] COPD  [ ] BMI> 35  [ ] Liver dysfunction  [ ] Renal dysfunction  [ ] CHF  [ ] Smoker  [x]  Age > 60 years    [x]  [place]  Reviewed and Copied to Chart. See below.    Plan of care and goal oriented pain management treatment options were discussed with patient and /or primary care giver; all questions and concerns were addressed and care was aligned with patient's wishes.    Educated patient on goal oriented pain management treatment options        Source of information: CAROL ROY, Chart review  Patient language: English  : n/a    HPI:  Patient is a 77 y/o F with who ambulates independently with  PMH of HTN and carotid stenosis and PSH of appendectomy presented after a fall.   Patient reports that after her usual run in the park she was walking at a slower pace to cool down when she tripped and fell on her left side. Patient reports she did not have any lightheadedness, or dizziness before the fall. Patient also denies hitting her head or losing consciousness. Patient reports that after the fall she had sharp pain in her left hip and left chest. Patient reports after the fall some passerby tried to help him get up but she was not able to stand back up.   Patient denies any recent fevers, chills, weakness, fatigue, malaise, headache, dizziness, lightheadedness, chest pain, palpitations, shortness of breath, cough, nausea, vomiting, abdominal pain, diarrhea, constipation, melena, hematochezia, dysuria, urinary frequency, or urgency. Patient denies any other complains at this time.   Patient reports taking multiple vitamins/supplements like vitamin D, calcium, magnesium, chondroitin sulphate, and glucosamine. Patient denies history of osteoporosis.     [attending addendum: discussed with pt's PMD / cardiologist, Dr Burton, patient with history of a carotid plaque Aortic insufficiency ( moderate) and possible CVA/TIA ( in [place])) but has not had any recent visit or cardiovascular workup.. >> Echo and carotic ordered)]      (18 Nov 2023 14:16)      Pt is admitted for s/p Fall. GOHCO. Found to have left Femoral neck fracture. Pain consulted for left hip pain.  Pt seen and examined at bedside. Reports pain score 5/10 on left hip when at rest and not moving, tolerable. But when moving left leg pain is 7-8/10 not tolerable. SCALE USED: (1-10 VNRS). Pt describes pain as aching and radiating to LLE, alleviated by pain medication and exacerbated by movement. Pt tolerating is NPO today. Denies lethargy, nausea, vomiting, constipation, itchiness. Reports last BM 11/18. Patient stated goal for pain control: to be able to take deep breaths, get out of bed to chair and ambulate with tolerable pain control. Pt reports taking Advil for pain at home. Pt is scheduled for surgery today with MD Espinoza. Procedure:  Left hip hemiarthroplasty      PAST MEDICAL & SURGICAL HISTORY:      FAMILY HISTORY:    Social History:  Patient denies smoking, alcohol use, or illicit drug use. (18 Nov 2023 14:16)   [x] Denies ETOH use, illicit drug use and smoking    Allergies    No Known Allergies    Intolerances    MEDICATIONS  (STANDING):  polyethylene glycol 3350 17 Gram(s) Oral daily  povidone iodine 10% Solution 1 Application(s) Topical once  senna 2 Tablet(s) Oral at bedtime    MEDICATIONS  (PRN):  acetaminophen     Tablet .. 650 milliGRAM(s) Oral every 6 hours PRN Temp greater or equal to 38C (100.4F), Mild Pain (1 - 3)  melatonin 3 milliGRAM(s) Oral at bedtime PRN Insomnia  ondansetron Injectable 4 milliGRAM(s) IV Push every 8 hours PRN Nausea and/or Vomiting  oxyCODONE    IR 2.5 milliGRAM(s) Oral every 6 hours PRN Moderate Pain (4 - 6)  oxyCODONE    IR 5 milliGRAM(s) Oral every 6 hours PRN Severe Pain (7 - 10)    Vital Signs Last 24 Hrs  T(C): 37 (20 Nov 2023 05:40), Max: 37.1 (19 Nov 2023 12:25)  T(F): 98.6 (20 Nov 2023 05:40), Max: 98.7 (19 Nov 2023 12:25)  HR: 64 (20 Nov 2023 05:40) (48 - 64)  BP: 156/78 (20 Nov 2023 05:40) (143/80 - 164/74)  BP(mean): --  RR: 17 (20 Nov 2023 05:40) (17 - 18)  SpO2: 98% (20 Nov 2023 05:40) (95% - 98%)    Parameters below as of 20 Nov 2023 05:40  Patient On (Oxygen Delivery Method): room air    LABS: Reviewed.                        13.4   7.27  )-----------( 180      ( 20 Nov 2023 04:58 )             40.2     11-20    135  |  103  |  15  ----------------------------<  99  3.6   |  29  |  0.72    Ca    9.5      20 Nov 2023 04:58  Phos  2.7     11-20  Mg     1.9     11-20      PT/INR - ( 20 Nov 2023 04:58 )   PT: 11.2 sec;   INR: 0.98 ratio       PTT - ( 20 Nov 2023 04:58 )  PTT:28.6 sec    Urinalysis Basic - ( 20 Nov 2023 04:58 )    Color: x / Appearance: x / SG: x / pH: x  Gluc: 99 mg/dL / Ketone: x  / Bili: x / Urobili: x   Blood: x / Protein: x / Nitrite: x   Leuk Esterase: x / RBC: x / WBC x   Sq Epi: x / Non Sq Epi: x / Bacteria: x    CAPILLARY BLOOD GLUCOSE    Radiology: Reviewed.     ACC: [id number] EXAM:  CT HIP ONLY LT   ORDERED BY: FRITZ TORRES     PROCEDURE DATE:  11/20/2023          INTERPRETATION:  CLINICAL INDICATION: Fall, assess left hip fracture.    TECHNIQUE: CT axial images of the left hip were obtained without   intravenous contrast. Coronal and sagittal reformatted images were also   obtained.    CONTRAST/COMPLICATIONS:  IV Contrast: NONE  Complications: None reported at time of study completion    COMPARISON: XR: 11/18/2023. CT: None. MR: None.    FINDINGS: Evaluation of soft tissue is limited without intravenous   contrast.    Bones: Decreased bone mineralization. Acute impacted left femoral neck   fracture with superomedial displacement. No dislocation    Degenerative changes of the visualized left sacroiliac joint, pubic   symphysis and left hip.    Soft tissue: Stranding at the left lateral hip superficial soft tissue,   likely edema/ecchymosis. Stranding at the deep left hip/proximal thigh   soft tissue. Diffuse muscle bulk loss with fatty replacement. Left hip   hemarthrosis. Chondrocalcinosis at the left hip and pubic symphysis.    Additional: Colon diverticulosis. Atherosclerosis.    IMPRESSION:    Acute impacted left femoral neck fracture.    --- End of Report ---    DAVE TENORIO MD; Attending Radiologist  This document has been electronically signed. Nov 20 2023  1:27AM  ACC: [id number] EXAM:  CT CHEST   ORDERED BY: MERA TIPTON     PROCEDURE DATE:  11/18/2023      INTERPRETATION:  HISTORY: Chest wall pain after fall.    EXAMINATION: CT CHEST was performed without IV contrast.    COMPARISON: None available.    FINDINGS:    AIRWAYS, LUNGS, PLEURA: Clear central airways. No consolidation.   Nonspecific right apical 0.6 cm ground-glass nodule with focal 0.3 cm   solid component (image 69, series 2). No pleural effusion.    MEDIASTINUM: Cardiomegaly. No pericardial effusion. Thoracic aorta normal   caliber.  No large mediastinal lymph nodes.    IMAGED ABDOMEN: Right hepatic lobe hypodensities, likely cysts.    SOFT TISSUES: Unremarkable.    BONES: No acute osseous abnormality.    IMPRESSION:.    No evidence of acute thoracic traumatic injury on this noncontrast CT   chest.    Nonspecific right apical 0.6 upper ground-glass nodule with focal 0.3 cm   solid component. Recommend CT chest follow-up in 3 months to assess   stability.    --- End of Report ---     DEJON RAMÍREZ MD; Attending Radiologist  This document has been electronically signed. Nov 18 2023 11:20AM    ORT Score -   Family Hx of substance abuse	Female	      Male  Alcohol 	                                           1                     3  Illegal drugs	                                   2                     3  Rx drugs                                           4 	                  4  Personal Hx of substance abuse		  Alcohol 	                                          3	                  3  Illegal drugs                                     4	                  4  Rx drugs                                            5 	                  5  Age between 16- 45 years	           1                     1  hx preadolescent sexual abuse	   3 	                  0  Psychological disease		  ADD, OCD, bipolar, schizophrenia   2	          2  Depression                                           1 	          1  Total: 0    a score of 3 or lower indicates low risk for opioid abuse		  a score of 4-7 indicates moderate risk for opioid abuse		  a score of 8 or higher indicates high risk for opioid abuse  	  REVIEW OF SYSTEMS:  CONSTITUTIONAL: No fever or fatigue  HEENT:  No difficulty hearing, no change in vision  NECK: No pain or stiffness  RESPIRATORY: No cough, wheezing, chills or hemoptysis; No shortness of breath  CARDIOVASCULAR: No chest pain, palpitations, dizziness, or leg swelling  GASTROINTESTINAL: No loss of appetite, decreased PO intake. No abdominal or epigastric pain. No nausea, vomiting; No diarrhea or constipation.   GENITOURINARY: No dysuria, frequency, hematuria, retention or incontinence  MUSCULOSKELETAL: + Left hip pain and swelling; No muscle, back, pain, no upper muscle strength weakness, no saddle anesthesia, bowel/bladder incontinence, + falls.   NEURO: No headaches, No numbness/tingling b/l LE, No weakness  ENDOCRINE: No polyuria, polydipsia, heat or cold intolerance; No hair loss  PSYCHIATRIC: No depression, anxiety or difficulty sleeping    PHYSICAL EXAM:  GENERAL:  Alert & Oriented X4, cooperative, NAD, Good concentration. Speech is clear.   RESPIRATORY: Respirations even and unlabored. Clear to auscultation bilaterally; No rales, rhonchi, wheezing, or rubs  CARDIOVASCULAR: Normal S1/S2, regular rate and rhythm; No murmurs, rubs, or gallops. No JVD.   GASTROINTESTINAL:  Soft, Nontender, Nondistended; Bowel sounds present. NPO order in place.  PERIPHERAL VASCULAR:  Extremities warm without edema. 2+ Peripheral Pulses, No cyanosis, No calf tenderness  MUSCULOSKELETAL: Motor Strength 5/5 B/L upper and 2/5 lower extremities; moves all extremities equally against gravity; ROM decreased to LLE due to pain; + tenderness on palpation of left hip.  SKIN: Warm, dry, intact. No rashes, lesions, scars or wounds.     Risk factors associated with adverse outcomes related to opioid treatment  [ ]  Concurrent benzodiazepine use  [ ]  History/ Active substance use or alcohol use disorder  [ ] Psychiatric co-morbidity  [ ] Sleep apnea  [ ] COPD  [ ] BMI> 35  [ ] Liver dysfunction  [ ] Renal dysfunction  [ ] CHF  [ ] Smoker  [x]  Age > 60 years    [x]  [place]  Reviewed and Copied to Chart. See below.    Plan of care and goal oriented pain management treatment options were discussed with patient and /or primary care giver; all questions and concerns were addressed and care was aligned with patient's wishes.    Educated patient on goal oriented pain management treatment options        Source of information: CAROL ROY, Chart review  Patient language: English  : n/a    HPI:  Patient is a 79 y/o F with who ambulates independently with  PMH of HTN and carotid stenosis and PSH of appendectomy presented after a fall.   Patient reports that after her usual run in the park she was walking at a slower pace to cool down when she tripped and fell on her left side. Patient reports she did not have any lightheadedness, or dizziness before the fall. Patient also denies hitting her head or losing consciousness. Patient reports that after the fall she had sharp pain in her left hip and left chest. Patient reports after the fall some passerby tried to help him get up but she was not able to stand back up.   Patient denies any recent fevers, chills, weakness, fatigue, malaise, headache, dizziness, lightheadedness, chest pain, palpitations, shortness of breath, cough, nausea, vomiting, abdominal pain, diarrhea, constipation, melena, hematochezia, dysuria, urinary frequency, or urgency. Patient denies any other complains at this time.   Patient reports taking multiple vitamins/supplements like vitamin D, calcium, magnesium, chondroitin sulphate, and glucosamine. Patient denies history of osteoporosis.     [attending addendum: discussed with pt's PMD / cardiologist, Dr Burton, patient with history of a carotid plaque Aortic insufficiency ( moderate) and possible CVA/TIA ( in [place])) but has not had any recent visit or cardiovascular workup.. >> Echo and carotic ordered)]      (18 Nov 2023 14:16)      Pt is admitted for s/p Fall. G[facility]. Found to have left Femoral neck fracture. Pain consulted for left hip pain.  Pt seen and examined at bedside. Reports pain score 5/10 on left hip when at rest and not moving, tolerable. But when moving left leg pain is 7-8/10 not tolerable. SCALE USED: (1-10 VNRS). Pt describes pain as aching and radiating to LLE, alleviated by pain medication and exacerbated by movement. Pt tolerating is NPO today. Denies lethargy, nausea, vomiting, constipation, itchiness. Reports last BM 11/18. Patient stated goal for pain control: to be able to take deep breaths, get out of bed to chair and ambulate with tolerable pain control. Pt reports taking Advil for pain at home. Pt is scheduled for surgery today with MD Espinoza. Procedure:  Left hip hemiarthroplasty      PAST MEDICAL & SURGICAL HISTORY:      FAMILY HISTORY:    Social History:  Patient denies smoking, alcohol use, or illicit drug use. (18 Nov 2023 14:16)   [x] Denies ETOH use, illicit drug use and smoking    Allergies    No Known Allergies    Intolerances    MEDICATIONS  (STANDING):  polyethylene glycol 3350 17 Gram(s) Oral daily  povidone iodine 10% Solution 1 Application(s) Topical once  senna 2 Tablet(s) Oral at bedtime    MEDICATIONS  (PRN):  acetaminophen     Tablet .. 650 milliGRAM(s) Oral every 6 hours PRN Temp greater or equal to 38C (100.4F), Mild Pain (1 - 3)  melatonin 3 milliGRAM(s) Oral at bedtime PRN Insomnia  ondansetron Injectable 4 milliGRAM(s) IV Push every 8 hours PRN Nausea and/or Vomiting  oxyCODONE    IR 2.5 milliGRAM(s) Oral every 6 hours PRN Moderate Pain (4 - 6)  oxyCODONE    IR 5 milliGRAM(s) Oral every 6 hours PRN Severe Pain (7 - 10)    Vital Signs Last 24 Hrs  T(C): 37 (20 Nov 2023 05:40), Max: 37.1 (19 Nov 2023 12:25)  T(F): 98.6 (20 Nov 2023 05:40), Max: 98.7 (19 Nov 2023 12:25)  HR: 64 (20 Nov 2023 05:40) (48 - 64)  BP: 156/78 (20 Nov 2023 05:40) (143/80 - 164/74)  BP(mean): --  RR: 17 (20 Nov 2023 05:40) (17 - 18)  SpO2: 98% (20 Nov 2023 05:40) (95% - 98%)    Parameters below as of 20 Nov 2023 05:40  Patient On (Oxygen Delivery Method): room air    LABS: Reviewed.                        13.4   7.27  )-----------( 180      ( 20 Nov 2023 04:58 )             40.2     11-20    135  |  103  |  15  ----------------------------<  99  3.6   |  29  |  0.72    Ca    9.5      20 Nov 2023 04:58  Phos  2.7     11-20  Mg     1.9     11-20      PT/INR - ( 20 Nov 2023 04:58 )   PT: 11.2 sec;   INR: 0.98 ratio       PTT - ( 20 Nov 2023 04:58 )  PTT:28.6 sec    Urinalysis Basic - ( 20 Nov 2023 04:58 )    Color: x / Appearance: x / SG: x / pH: x  Gluc: 99 mg/dL / Ketone: x  / Bili: x / Urobili: x   Blood: x / Protein: x / Nitrite: x   Leuk Esterase: x / RBC: x / WBC x   Sq Epi: x / Non Sq Epi: x / Bacteria: x    CAPILLARY BLOOD GLUCOSE    Radiology: Reviewed.     ACC: [id number] EXAM:  CT HIP ONLY LT   ORDERED BY: FRITZ TORRES     PROCEDURE DATE:  11/20/2023          INTERPRETATION:  CLINICAL INDICATION: Fall, assess left hip fracture.    TECHNIQUE: CT axial images of the left hip were obtained without   intravenous contrast. Coronal and sagittal reformatted images were also   obtained.    CONTRAST/COMPLICATIONS:  IV Contrast: NONE  Complications: None reported at time of study completion    COMPARISON: XR: 11/18/2023. CT: None. MR: None.    FINDINGS: Evaluation of soft tissue is limited without intravenous   contrast.    Bones: Decreased bone mineralization. Acute impacted left femoral neck   fracture with superomedial displacement. No dislocation    Degenerative changes of the visualized left sacroiliac joint, pubic   symphysis and left hip.    Soft tissue: Stranding at the left lateral hip superficial soft tissue,   likely edema/ecchymosis. Stranding at the deep left hip/proximal thigh   soft tissue. Diffuse muscle bulk loss with fatty replacement. Left hip   hemarthrosis. Chondrocalcinosis at the left hip and pubic symphysis.    Additional: Colon diverticulosis. Atherosclerosis.    IMPRESSION:    Acute impacted left femoral neck fracture.    --- End of Report ---    DAVE TENORIO MD; Attending Radiologist  This document has been electronically signed. Nov 20 2023  1:27AM  ACC: [id number] EXAM:  CT CHEST   ORDERED BY: MERA TIPTON     PROCEDURE DATE:  11/18/2023      INTERPRETATION:  HISTORY: Chest wall pain after fall.    EXAMINATION: CT CHEST was performed without IV contrast.    COMPARISON: None available.    FINDINGS:    AIRWAYS, LUNGS, PLEURA: Clear central airways. No consolidation.   Nonspecific right apical 0.6 cm ground-glass nodule with focal 0.3 cm   solid component (image 69, series 2). No pleural effusion.    MEDIASTINUM: Cardiomegaly. No pericardial effusion. Thoracic aorta normal   caliber.  No large mediastinal lymph nodes.    IMAGED ABDOMEN: Right hepatic lobe hypodensities, likely cysts.    SOFT TISSUES: Unremarkable.    BONES: No acute osseous abnormality.    IMPRESSION:.    No evidence of acute thoracic traumatic injury on this noncontrast CT   chest.    Nonspecific right apical 0.6 upper ground-glass nodule with focal 0.3 cm   solid component. Recommend CT chest follow-up in 3 months to assess   stability.    --- End of Report ---     DEJON RAMÍREZ MD; Attending Radiologist  This document has been electronically signed. Nov 18 2023 11:20AM    ORT Score -   Family Hx of substance abuse	Female	      Male  Alcohol 	                                           1                     3  Illegal drugs	                                   2                     3  Rx drugs                                           4 	                  4  Personal Hx of substance abuse		  Alcohol 	                                          3	                  3  Illegal drugs                                     4	                  4  Rx drugs                                            5 	                  5  Age between 16- 45 years	           1                     1  hx preadolescent sexual abuse	   3 	                  0  Psychological disease		  ADD, OCD, bipolar, schizophrenia   2	          2  Depression                                           1 	          1  Total: 0    a score of 3 or lower indicates low risk for opioid abuse		  a score of 4-7 indicates moderate risk for opioid abuse		  a score of 8 or higher indicates high risk for opioid abuse  	  REVIEW OF SYSTEMS:  CONSTITUTIONAL: No fever or fatigue  HEENT:  No difficulty hearing, no change in vision  NECK: No pain or stiffness  RESPIRATORY: No cough, wheezing, chills or hemoptysis; No shortness of breath  CARDIOVASCULAR: No chest pain, palpitations, dizziness, or leg swelling  GASTROINTESTINAL: No loss of appetite, decreased PO intake. No abdominal or epigastric pain. No nausea, vomiting; No diarrhea or constipation.   GENITOURINARY: No dysuria, frequency, hematuria, retention or incontinence  MUSCULOSKELETAL: + Left hip pain and swelling; No muscle, back, pain, no upper muscle strength weakness, no saddle anesthesia, bowel/bladder incontinence, + falls.   NEURO: No headaches, No numbness/tingling b/l LE, No weakness  ENDOCRINE: No polyuria, polydipsia, heat or cold intolerance; No hair loss  PSYCHIATRIC: No depression, anxiety or difficulty sleeping    PHYSICAL EXAM:  GENERAL:  Alert & Oriented X4, cooperative, NAD, Good concentration. Speech is clear.   RESPIRATORY: Respirations even and unlabored. Clear to auscultation bilaterally; No rales, rhonchi, wheezing, or rubs  CARDIOVASCULAR: Normal S1/S2, regular rate and rhythm; No murmurs, rubs, or gallops. No JVD.   GASTROINTESTINAL:  Soft, Nontender, Nondistended; Bowel sounds present. NPO order in place.  PERIPHERAL VASCULAR:  Extremities warm without edema. 2+ Peripheral Pulses, No cyanosis, No calf tenderness  MUSCULOSKELETAL: Motor Strength 5/5 B/L upper and 2/5 lower extremities; moves all extremities equally against gravity; ROM decreased to LLE due to pain; + tenderness on palpation of left hip.  SKIN: Warm, dry, intact. No rashes, lesions, scars or wounds.     Risk factors associated with adverse outcomes related to opioid treatment  [ ]  Concurrent benzodiazepine use  [ ]  History/ Active substance use or alcohol use disorder  [ ] Psychiatric co-morbidity  [ ] Sleep apnea  [ ] COPD  [ ] BMI> 35  [ ] Liver dysfunction  [ ] Renal dysfunction  [ ] CHF  [ ] Smoker  [x]  Age > 60 years    [x]  [place]  Reviewed and Copied to Chart. See below.    Plan of care and goal oriented pain management treatment options were discussed with patient and /or primary care giver; all questions and concerns were addressed and care was aligned with patient's wishes.    Educated patient on goal oriented pain management treatment options        Source of information: CAROL ROY, Chart review  Patient language: English  : n/a    HPI:  Patient is a 77 y/o F with who ambulates independently with  PMH of HTN and carotid stenosis and PSH of appendectomy presented after a fall.   Patient reports that after her usual run in the park she was walking at a slower pace to cool down when she tripped and fell on her left side. Patient reports she did not have any lightheadedness, or dizziness before the fall. Patient also denies hitting her head or losing consciousness. Patient reports that after the fall she had sharp pain in her left hip and left chest. Patient reports after the fall some passerby tried to help him get up but she was not able to stand back up.   Patient denies any recent fevers, chills, weakness, fatigue, malaise, headache, dizziness, lightheadedness, chest pain, palpitations, shortness of breath, cough, nausea, vomiting, abdominal pain, diarrhea, constipation, melena, hematochezia, dysuria, urinary frequency, or urgency. Patient denies any other complains at this time.   Patient reports taking multiple vitamins/supplements like vitamin D, calcium, magnesium, chondroitin sulphate, and glucosamine. Patient denies history of osteoporosis.     [attending addendum: discussed with pt's PMD / cardiologist, Dr Burton, patient with history of a carotid plaque Aortic insufficiency ( moderate) and possible CVA/TIA ( in [place])) but has not had any recent visit or cardiovascular workup.. >> Echo and carotic ordered)]      (18 Nov 2023 14:16)      Pt is admitted for s/p Fall. [facility]. Found to have left Femoral neck fracture. Pain consulted for left hip pain.  Pt seen and examined at bedside. Reports pain score 5/10 on left hip when at rest and not moving, tolerable. But when moving left leg pain is 7-8/10 not tolerable. SCALE USED: (1-10 VNRS). Pt describes pain as aching and radiating to LLE, alleviated by pain medication and exacerbated by movement. Pt tolerating is NPO today. Denies lethargy, nausea, vomiting, constipation, itchiness. Reports last BM 11/18. Patient stated goal for pain control: to be able to take deep breaths, get out of bed to chair and ambulate with tolerable pain control. Pt reports taking Advil for pain at home. Pt is scheduled for surgery today with MD Espinoza. Procedure:  Left hip hemiarthroplasty      PAST MEDICAL & SURGICAL HISTORY:      FAMILY HISTORY:    Social History:  Patient denies smoking, alcohol use, or illicit drug use. (18 Nov 2023 14:16)   [x] Denies ETOH use, illicit drug use and smoking    Allergies    No Known Allergies    Intolerances    MEDICATIONS  (STANDING):  polyethylene glycol 3350 17 Gram(s) Oral daily  povidone iodine 10% Solution 1 Application(s) Topical once  senna 2 Tablet(s) Oral at bedtime    MEDICATIONS  (PRN):  acetaminophen     Tablet .. 650 milliGRAM(s) Oral every 6 hours PRN Temp greater or equal to 38C (100.4F), Mild Pain (1 - 3)  melatonin 3 milliGRAM(s) Oral at bedtime PRN Insomnia  ondansetron Injectable 4 milliGRAM(s) IV Push every 8 hours PRN Nausea and/or Vomiting  oxyCODONE    IR 2.5 milliGRAM(s) Oral every 6 hours PRN Moderate Pain (4 - 6)  oxyCODONE    IR 5 milliGRAM(s) Oral every 6 hours PRN Severe Pain (7 - 10)    Vital Signs Last 24 Hrs  T(C): 37 (20 Nov 2023 05:40), Max: 37.1 (19 Nov 2023 12:25)  T(F): 98.6 (20 Nov 2023 05:40), Max: 98.7 (19 Nov 2023 12:25)  HR: 64 (20 Nov 2023 05:40) (48 - 64)  BP: 156/78 (20 Nov 2023 05:40) (143/80 - 164/74)  BP(mean): --  RR: 17 (20 Nov 2023 05:40) (17 - 18)  SpO2: 98% (20 Nov 2023 05:40) (95% - 98%)    Parameters below as of 20 Nov 2023 05:40  Patient On (Oxygen Delivery Method): room air    LABS: Reviewed.                        13.4   7.27  )-----------( 180      ( 20 Nov 2023 04:58 )             40.2     11-20    135  |  103  |  15  ----------------------------<  99  3.6   |  29  |  0.72    Ca    9.5      20 Nov 2023 04:58  Phos  2.7     11-20  Mg     1.9     11-20      PT/INR - ( 20 Nov 2023 04:58 )   PT: 11.2 sec;   INR: 0.98 ratio       PTT - ( 20 Nov 2023 04:58 )  PTT:28.6 sec    Urinalysis Basic - ( 20 Nov 2023 04:58 )    Color: x / Appearance: x / SG: x / pH: x  Gluc: 99 mg/dL / Ketone: x  / Bili: x / Urobili: x   Blood: x / Protein: x / Nitrite: x   Leuk Esterase: x / RBC: x / WBC x   Sq Epi: x / Non Sq Epi: x / Bacteria: x    CAPILLARY BLOOD GLUCOSE    Radiology: Reviewed.     ACC: [id number] EXAM:  CT HIP ONLY LT   ORDERED BY: FRITZ TORRES     PROCEDURE DATE:  11/20/2023          INTERPRETATION:  CLINICAL INDICATION: Fall, assess left hip fracture.    TECHNIQUE: CT axial images of the left hip were obtained without   intravenous contrast. Coronal and sagittal reformatted images were also   obtained.    CONTRAST/COMPLICATIONS:  IV Contrast: NONE  Complications: None reported at time of study completion    COMPARISON: XR: 11/18/2023. CT: None. MR: None.    FINDINGS: Evaluation of soft tissue is limited without intravenous   contrast.    Bones: Decreased bone mineralization. Acute impacted left femoral neck   fracture with superomedial displacement. No dislocation    Degenerative changes of the visualized left sacroiliac joint, pubic   symphysis and left hip.    Soft tissue: Stranding at the left lateral hip superficial soft tissue,   likely edema/ecchymosis. Stranding at the deep left hip/proximal thigh   soft tissue. Diffuse muscle bulk loss with fatty replacement. Left hip   hemarthrosis. Chondrocalcinosis at the left hip and pubic symphysis.    Additional: Colon diverticulosis. Atherosclerosis.    IMPRESSION:    Acute impacted left femoral neck fracture.    --- End of Report ---    DAVE TENORIO MD; Attending Radiologist  This document has been electronically signed. Nov 20 2023  1:27AM  ACC: [id number] EXAM:  CT CHEST   ORDERED BY: MERA TIPTON     PROCEDURE DATE:  11/18/2023      INTERPRETATION:  HISTORY: Chest wall pain after fall.    EXAMINATION: CT CHEST was performed without IV contrast.    COMPARISON: None available.    FINDINGS:    AIRWAYS, LUNGS, PLEURA: Clear central airways. No consolidation.   Nonspecific right apical 0.6 cm ground-glass nodule with focal 0.3 cm   solid component (image 69, series 2). No pleural effusion.    MEDIASTINUM: Cardiomegaly. No pericardial effusion. Thoracic aorta normal   caliber.  No large mediastinal lymph nodes.    IMAGED ABDOMEN: Right hepatic lobe hypodensities, likely cysts.    SOFT TISSUES: Unremarkable.    BONES: No acute osseous abnormality.    IMPRESSION:.    No evidence of acute thoracic traumatic injury on this noncontrast CT   chest.    Nonspecific right apical 0.6 upper ground-glass nodule with focal 0.3 cm   solid component. Recommend CT chest follow-up in 3 months to assess   stability.    --- End of Report ---     DEJON RAMÍREZ MD; Attending Radiologist  This document has been electronically signed. Nov 18 2023 11:20AM    ORT Score -   Family Hx of substance abuse	Female	      Male  Alcohol 	                                           1                     3  Illegal drugs	                                   2                     3  Rx drugs                                           4 	                  4  Personal Hx of substance abuse		  Alcohol 	                                          3	                  3  Illegal drugs                                     4	                  4  Rx drugs                                            5 	                  5  Age between 16- 45 years	           1                     1  hx preadolescent sexual abuse	   3 	                  0  Psychological disease		  ADD, OCD, bipolar, schizophrenia   2	          2  Depression                                           1 	          1  Total: 0    a score of 3 or lower indicates low risk for opioid abuse		  a score of 4-7 indicates moderate risk for opioid abuse		  a score of 8 or higher indicates high risk for opioid abuse  	  REVIEW OF SYSTEMS:  CONSTITUTIONAL: No fever or fatigue  HEENT:  No difficulty hearing, no change in vision  NECK: No pain or stiffness  RESPIRATORY: No cough, wheezing, chills or hemoptysis; No shortness of breath  CARDIOVASCULAR: No chest pain, palpitations, dizziness, or leg swelling  GASTROINTESTINAL: No loss of appetite, decreased PO intake. No abdominal or epigastric pain. No nausea, vomiting; No diarrhea or constipation.   GENITOURINARY: No dysuria, frequency, hematuria, retention or incontinence  MUSCULOSKELETAL: + Left hip pain and swelling; No muscle, back, pain, no upper muscle strength weakness, no saddle anesthesia, bowel/bladder incontinence, + falls.   NEURO: No headaches, No numbness/tingling b/l LE, No weakness  ENDOCRINE: No polyuria, polydipsia, heat or cold intolerance; No hair loss  PSYCHIATRIC: No depression, anxiety or difficulty sleeping    PHYSICAL EXAM:  GENERAL:  Alert & Oriented X4, cooperative, NAD, Good concentration. Speech is clear.   RESPIRATORY: Respirations even and unlabored. Clear to auscultation bilaterally; No rales, rhonchi, wheezing, or rubs  CARDIOVASCULAR: Normal S1/S2, regular rate and rhythm; No murmurs, rubs, or gallops. No JVD.   GASTROINTESTINAL:  Soft, Nontender, Nondistended; Bowel sounds present. NPO order in place.  PERIPHERAL VASCULAR:  Extremities warm without edema. 2+ Peripheral Pulses, No cyanosis, No calf tenderness  MUSCULOSKELETAL: Motor Strength 5/5 B/L upper and 2/5 lower extremities; moves all extremities equally against gravity; ROM decreased to LLE due to pain; + tenderness on palpation of left hip.  SKIN: Warm, dry, intact. No rashes, lesions, scars or wounds.     Risk factors associated with adverse outcomes related to opioid treatment  [ ]  Concurrent benzodiazepine use  [ ]  History/ Active substance use or alcohol use disorder  [ ] Psychiatric co-morbidity  [ ] Sleep apnea  [ ] COPD  [ ] BMI> 35  [ ] Liver dysfunction  [ ] Renal dysfunction  [ ] CHF  [ ] Smoker  [x]  Age > 60 years    [x]  [place]  Reviewed and Copied to Chart. See below.    Plan of care and goal oriented pain management treatment options were discussed with patient and /or primary care giver; all questions and concerns were addressed and care was aligned with patient's wishes.    Educated patient on goal oriented pain management treatment options

## 2023-11-20 NOTE — PROGRESS NOTE ADULT - SUBJECTIVE AND OBJECTIVE BOX
NP Note discussed with  primary attending    Patient is a 78y old  Female who presents with a chief complaint of Left hip femoral neck fracture (20 Nov 2023 11:59)      INTERVAL HPI/OVERNIGHT EVENTS: no new complaints    MEDICATIONS  (STANDING):  polyethylene glycol 3350 17 Gram(s) Oral daily  povidone iodine 10% Solution 1 Application(s) Topical once  senna 2 Tablet(s) Oral at bedtime    MEDICATIONS  (PRN):  acetaminophen     Tablet .. 650 milliGRAM(s) Oral every 6 hours PRN Temp greater or equal to 38C (100.4F), Mild Pain (1 - 3)  melatonin 3 milliGRAM(s) Oral at bedtime PRN Insomnia  ondansetron Injectable 4 milliGRAM(s) IV Push every 8 hours PRN Nausea and/or Vomiting  oxyCODONE    IR 5 milliGRAM(s) Oral every 4 hours PRN Severe Pain (7 - 10)  oxyCODONE    IR 2.5 milliGRAM(s) Oral every 4 hours PRN Moderate Pain (4 - 6)      __________________________________________________  REVIEW OF SYSTEMS:    CONSTITUTIONAL: No fever,   RESPIRATORY: No cough; No shortness of breath  CARDIOVASCULAR: No chest pain, no palpitations  GASTROINTESTINAL: No pain. No nausea or vomiting; No diarrhea   NEUROLOGICAL: No headache or numbness, no tremors  MUSCULOSKELETAL: left hip pain   GENITOURINARY: no dysuria,         Vital Signs Last 24 Hrs  T(C): 36.6 (20 Nov 2023 14:11), Max: 37 (20 Nov 2023 05:40)  T(F): 97.9 (20 Nov 2023 14:11), Max: 98.6 (20 Nov 2023 05:40)  HR: 64 (20 Nov 2023 14:11) (48 - 64)  BP: 153/75 (20 Nov 2023 14:11) (143/80 - 156/78)  BP(mean): --  RR: 18 (20 Nov 2023 14:11) (17 - 18)  SpO2: 97% (20 Nov 2023 14:11) (95% - 98%)    Parameters below as of 20 Nov 2023 14:11  Patient On (Oxygen Delivery Method): room air        ________________________________________________  PHYSICAL EXAM:  GENERAL: NAD  CHEST/LUNG: Clear to ausculitation  HEART: S1 S2  regular; no murmurs, gallops or rubs  ABDOMEN: Soft, Nontender, Nondistended; Bowel sounds present  EXTREMITIES: left hip ttp, decreased ROM due to pain   SKIN: warm and dry; no rash  NERVOUS SYSTEM:  Awake and alert; Oriented  to place, person and time ; no new deficits    _________________________________________________  LABS:                        13.4   7.27  )-----------( 180      ( 20 Nov 2023 04:58 )             40.2     11-20    135  |  103  |  15  ----------------------------<  99  3.6   |  29  |  0.72    Ca    9.5      20 Nov 2023 04:58  Phos  2.7     11-20  Mg     1.9     11-20      PT/INR - ( 20 Nov 2023 04:58 )   PT: 11.2 sec;   INR: 0.98 ratio         PTT - ( 20 Nov 2023 04:58 )  PTT:28.6 sec  Urinalysis Basic - ( 20 Nov 2023 04:58 )    Color: x / Appearance: x / SG: x / pH: x  Gluc: 99 mg/dL / Ketone: x  / Bili: x / Urobili: x   Blood: x / Protein: x / Nitrite: x   Leuk Esterase: x / RBC: x / WBC x   Sq Epi: x / Non Sq Epi: x / Bacteria: x      CAPILLARY BLOOD GLUCOSE            RADIOLOGY & ADDITIONAL TESTS:    Imaging Personally Reviewed:  YES/NO    Consultant(s) Notes Reviewed:   YES/ No    Care Discussed with Consultants :     Plan of care was discussed with patient and /or primary care giver; all questions and concerns were addressed and care was aligned with patient's wishes.

## 2023-11-21 DIAGNOSIS — Z02.9 ENCOUNTER FOR ADMINISTRATIVE EXAMINATIONS, UNSPECIFIED: ICD-10-CM

## 2023-11-21 DIAGNOSIS — G89.18 OTHER ACUTE POSTPROCEDURAL PAIN: ICD-10-CM

## 2023-11-21 LAB
GLUCOSE BLDC GLUCOMTR-MCNC: 96 MG/DL — SIGNIFICANT CHANGE UP (ref 70–99)

## 2023-11-21 PROCEDURE — 99232 SBSQ HOSP IP/OBS MODERATE 35: CPT

## 2023-11-21 RX ORDER — ACETAMINOPHEN 500 MG
1000 TABLET ORAL EVERY 8 HOURS
Refills: 0 | Status: DISCONTINUED | OUTPATIENT
Start: 2023-11-21 | End: 2023-11-22

## 2023-11-21 RX ORDER — OXYCODONE HYDROCHLORIDE 5 MG/1
5 TABLET ORAL EVERY 4 HOURS
Refills: 0 | Status: DISCONTINUED | OUTPATIENT
Start: 2023-11-21 | End: 2023-11-22

## 2023-11-21 RX ADMIN — ENOXAPARIN SODIUM 30 MILLIGRAM(S): 100 INJECTION SUBCUTANEOUS at 17:50

## 2023-11-21 RX ADMIN — OXYCODONE HYDROCHLORIDE 2.5 MILLIGRAM(S): 5 TABLET ORAL at 20:50

## 2023-11-21 RX ADMIN — OXYCODONE HYDROCHLORIDE 2.5 MILLIGRAM(S): 5 TABLET ORAL at 12:36

## 2023-11-21 RX ADMIN — Medication 1000 MILLIGRAM(S): at 20:05

## 2023-11-21 RX ADMIN — POLYETHYLENE GLYCOL 3350 17 GRAM(S): 17 POWDER, FOR SOLUTION ORAL at 16:46

## 2023-11-21 RX ADMIN — Medication 100 MILLIGRAM(S): at 02:55

## 2023-11-21 RX ADMIN — Medication 100 MILLIGRAM(S): at 09:51

## 2023-11-21 RX ADMIN — ENOXAPARIN SODIUM 30 MILLIGRAM(S): 100 INJECTION SUBCUTANEOUS at 06:16

## 2023-11-21 RX ADMIN — Medication 15 MILLIGRAM(S): at 20:05

## 2023-11-21 RX ADMIN — Medication 650 MILLIGRAM(S): at 20:05

## 2023-11-21 RX ADMIN — OXYCODONE HYDROCHLORIDE 2.5 MILLIGRAM(S): 5 TABLET ORAL at 14:12

## 2023-11-21 RX ADMIN — OXYCODONE HYDROCHLORIDE 2.5 MILLIGRAM(S): 5 TABLET ORAL at 19:45

## 2023-11-21 RX ADMIN — Medication 290 MILLIGRAM(S): at 05:32

## 2023-11-21 RX ADMIN — Medication 725 MILLIGRAM(S): at 05:51

## 2023-11-21 NOTE — PROGRESS NOTE ADULT - ASSESSMENT
77 y/o F, ambulates independently with PMH of HTN and carotid stenosis and PSH of appendectomy presented S/P fall.  X-ray pelvis showed a left femoral neck fracture, CT chest - No evidence of acute thoracic traumatic injury.    Admitted for further management of left femoral neck fracture. Ortho consulted, Day 1 S/P Left hip hemiarthroplasty.

## 2023-11-21 NOTE — PHYSICAL THERAPY INITIAL EVALUATION ADULT - PERTINENT HX OF CURRENT PROBLEM, REHAB EVAL
s/p fall in community; percutaneous pinning of left hip POD#1  h/o HTN, carotid stenosis  SHx: appendectomy

## 2023-11-21 NOTE — PHYSICAL THERAPY INITIAL EVALUATION ADULT - GENERAL OBSERVATIONS, REHAB EVAL
awake, alert, NAD; + peripheral IV access on right forearm; +gauze and tegaderm dressing on left hip area; c/d/i; (-) pronator drift on upper extremities; no asymmetrical weakness or impaired sensation on extremities

## 2023-11-21 NOTE — PHYSICAL THERAPY INITIAL EVALUATION ADULT - PRECAUTIONS/LIMITATIONS, REHAB EVAL
Post Acute Virtual Skilled Nursing Home Subsequent  Visit Note     Date of Service: 3/1/2021    Time of Service: 1055    This visit is being performed via video to discuss No chief complaint on file.    Clinician Location: [facility]    Jimi is in [place] and his identity has been established.   He was informed that consent to treat includes permission to submit charges to the applicable insurance on file. Jimi was advised regarding the potential risk inherent in video visits, as the assessment may be limited due to what can be seen on the screen which potentially results in an incomplete assessment; as well as either of us may discontinue the video visit if it is felt that the videoconferencing connections are not adequate for his/her situation.        Patient Location: SNF   Participants: patient, nurse    Subacute / Skilled Need: Rehabilitation    PCP: Dylan Marin DO   Patient Care Team:  Dylan Marin DO as PCP - General (Internal Medicine)  Jeff Jenkins MD as Post Acute Facility Provider: Physician (Geriatric Medicine)  Caitlin Payne CNP as Post Acute Facility Provider: APC (Nurse Practitioner - Family)  Attending SNF MD: Jeff Jenkins    Jimi Larson is a 74 year old male presenting to Post Acute Skilled Nursing for: Bilateral ankle fractures  History of Present Illness: Patient is a 74-year-old male history significant for Atrial flutter, hypertension, leukemia status post chemo presented to hospital after a fall.  He was found to have bilateral ankle fractures.  Status post ORIF 2/16/21.  Once optimized he was discharged to [facility] for rehabilitation.    3/1/21: Patient is resting comfortably, in no acute distress. He denies any chills, fevers, N/V/abd pain, diarrhea, constipation, hematemesis, black or bloody stools, dyspnea, cough, CP, dizziness, or any other complaints.Insomnia improved.  21 2021: Patient reports well-controlled pain  with current regimen.        HISTORY  Past Medical History:   Diagnosis Date   • Colon polyps    • DVT of lower extremity, bilateral (CMS/HCC)    • Fluttering heart    • Hairy cell leukemia (CMS/HCC)    • History of atrial flutter    • Pulmonary emboli (CMS/HCC)     bilateral   • Seizures (CMS/HCC)       reports that he has never smoked. He has never used smokeless tobacco. He reports that he does not drink alcohol or use drugs.  Past Surgical History:   Procedure Laterality Date   • Ablation - atrial flutter     • Hip surgery Right     ORIF   • Splenectomy, total     • Tonsillectomy     • Ventral hernia repair       Family History   Problem Relation Age of Onset   • Cancer, Lung Father      History     Not marked as reviewed during this visit.          PROBLEM LIST:  Specialty Problems     None           DEPRESSION SCREENING:  PHQ 2/9 Test Results  0: Not at all  1: Several days  2: More than half the days  3: Nearly every day      DEPRESSION ASSESSMENT/PLAN:  Depression screening is negative no further plan needed.    ALLERGIES:  Allergies as of 03/01/2021 - Reviewed 02/26/2021   Allergen Reaction Noted   • Moxifloxacin RASH 01/12/2011   • Azithromycin RASH 08/07/2019   • Shellfish allergy   (food or med) DIARRHEA 06/01/2009       CURRENT MEDICATIONS:   Current Outpatient Medications   Medication Sig Dispense Refill   • hydrochlorothiazide (HYDRODIURIL) 12.5 MG tablet TAKE 1 TABLET BY MOUTH DAILY 90 tablet 3   • ondansetron (ZOFRAN) 4 MG tablet Take 4 mg by mouth daily as needed.     • bisacodyl (DULCOLAX) 10 MG suppository Place 10 mg rectally daily as needed.     • rivaroxaban (XARELTO) 10 MG Tab Take 1 tablet by mouth daily.     • HYDROcodone-acetaminophen (NORCO) 5-325 MG per tablet Take 1 tablet by mouth every 4 hours as needed.     • atorvastatin (LIPITOR) 10 MG tablet TAKE 1 TABLET BY MOUTH EVERY DAY 90 tablet 0   • alendronate (FOSAMAX) 70 MG tablet TAKE 1 TABLET BY MOUTH EVERY WEEK 4 tablet 2   •  tamsulosin (FLOMAX) 0.4 MG Cap Take 1 capsule by mouth daily.     • topiramate (TOPAMAX) 25 MG tablet Take 1 tablet by mouth 2 times daily. 60 tablet 6     No current facility-administered medications for this visit.      Medications reviewed / reconciled: Yes    BASELINE FUNCTIONAL STATUS:  Independent    CURRENT FUNCTIONAL STATUS:  Non weight bearing (NWB) BLE)  2/24/2021: Nonambulatory, transfers min a with sliding board, bed mobility contact-guard assist,  dressing uppers standby assist, lowers max A, bathing uppers min A, lowers max A, toilet transfers not tested    2/26/2021: Nonambulatory: Sliding board standby assist, bed mobility standby assist/supervision, dressing bathing uppers standby assist, dressing lowers mod/max A, bathing lowers min A, toilet transfers not tested hygiene max A    3/1: Nonambulatory, transfers sliding board standby assist, bed mobility standby assist/supervision, dressing uppers set up, lowers min A, bathing uppers standby assist, lowers mod A, toilet transfers NT, hygiene max A, feeding independent  DIET:  Consistency: General   Type: regular  Appetite: Normal    REVIEW OF SYSTEMS:  Review of Systems   Constitutional: Negative for activity change, chills, fatigue and fever.   HENT: Negative for congestion, ear pain and sinus pressure.    Eyes: Negative.  Negative for discharge and visual disturbance.   Respiratory: Negative for cough, shortness of breath and wheezing.    Cardiovascular: Negative for chest pain and leg swelling.   Gastrointestinal: Negative for abdominal distention, abdominal pain, constipation, diarrhea, nausea and vomiting.   Endocrine: Negative for cold intolerance.   Genitourinary: Negative for difficulty urinating, hematuria and urgency.   Musculoskeletal: Negative for joint swelling and neck pain.   Skin: Negative for color change and rash.   Neurological: Negative for dizziness, numbness and headaches.   Psychiatric/Behavioral: Negative for confusion.        PHYSICAL ASSESSMENT:  Examined by nurse  Physical Exam  Constitutional:       Appearance: He is well-developed.   HENT:      Head: Normocephalic and atraumatic.   Eyes:      Conjunctiva/sclera: Conjunctivae normal.      Pupils: Pupils are equal, round, and reactive to light.   Neck:      Musculoskeletal: Neck supple.   Cardiovascular:      Rate and Rhythm: Normal rate and regular rhythm.      Heart sounds: Normal heart sounds.   Pulmonary:      Effort: No respiratory distress.      Breath sounds: Normal breath sounds. No wheezing.   Abdominal:      General: Bowel sounds are normal. There is no distension.      Palpations: Abdomen is soft.      Tenderness: There is no abdominal tenderness.   Musculoskeletal: Normal range of motion.         General: No tenderness.      Comments: Bilateral ankles with splints, intact CMS distally. Trace edema LLE ; compartments soft   Skin:     General: Skin is warm and dry.   Neurological:      General: No focal deficit present.      Mental Status: He is alert and oriented to person, place, and time.   Psychiatric:         Mood and Affect: Mood normal.         Behavior: Behavior normal.         VITALS:  Visit Vitals  /87   Pulse 76   Temp 97.2 °F (36.2 °C)   Resp 18   Wt 105.9 kg (233 lb 6.4 oz)   SpO2 96%   BMI 34.47 kg/m²         Pain Level 0 /10    LABS:    3/1/2021: White blood count 9.5, hemoglobin 13.7, platelets 454, magnesium 2.6, phosphorus 3.0, BMP pending    2/25/2021: White blood count 9.7, hemoglobin 13.6, platelets 401, sodium 133, potassium 4.4, BUN 20, creatinine 0.7, glucose 100  22 2021: White blood count 11.5, hemoglobin 13.5, platelets 325, sodium 131, potassium 3.9, BUN 21, creatinine 0.7, AST 30, ALT 42, albumin 3.3, magnesium 1.8, phosphorus 3.3.    2/19/2021: White blood count 9.3, hemoglobin 13.6, platelets 194, sodium 133, potassium 3.4, BUN 17, creatinine 0.8, glucose 103, ALT 32, AST 45, alk phosphatase 64, total bilirubin 0.7, albumin 3.3,  calcium 8.0, magnesium 2.0, phosphorus 2.5    2/15 2021: White blood count 11.8, hemoglobin 15.6, platelets 224 sodium 139, potassium 3.7, BUN 18, creatinine 1.14, alk phosphatase 57, ALT 27, AST 28, total bilirubin 0.5    Advance Care Planning     Advance Care Planning Visit     Patient and/or decision maker consent to Advance Care Planning visit.    Inclusion criteria: Inclusion criteria not met, but goals of care discussion needed. Use .GOC to document goals of care discussion    PCP:  LEXY Marin    Participants:  Patient, APN    Location:  [facility]    Purpose of the Meeting:  Advance Care Planning and Goals of Care discussion    Is the patient capable of making healthcare decisions: Yes, the patient is able to receive, process, and then give feedback to information regarding medical discussions.   Decision Maker: patient    Does the patient have an Advance Care Directive document in Epic? No, a consult for Social Work to assist the patient in completion of an Advance Care Directive document has been placed.     After code status discussion, the patient has decided on: Full Resuscitation       Goals of Care:  Patient has one or more life-limiting conditions: No    A discussion of the patient's Goals of Care has been completed with patient regarding the following:  (1) Current Serious Conditions: Hx leukemia  (2) Prognosis: FAIR     Function: Decline in function and Decline in mobility  (3) Patient-Centered Goals: Aggressive, usual medical care  (4) Plan for Future Deterioration: Would be ok with returning to hospital for care.  Would be ok with returning to ICU for care.    The patient verbalized understanding of the above discussion.    The following additional care is recommended: None             ASSESSMENT AND PLAN    Closed bimalleolar fracture With dislocation status post ORIF by Dr. Jeff Mora 2/16/21. NWB.F/u ortho 3/4    BPH: Patient reports frequency.  PVRs every shift.  Continue Flomax    Dyslipidemia:  Continue statin    Hypertension:HCTZ on hold 2/2 hyponatremia.  BP appears to be well controlled. Restart if repeat Na wnl    Insomnia:improved  w/ malatonin      Hyponatremia: 1200ml FR, hold HCTZ, improved, Na 133, 3/1 BMP pending    DVT prophylaxis: Continue Xarelto per Ortho recommendation  GI prophylaxis: PPI   Bowel regimen: MiraLAX daily, senna S as needed        FOLLOW UP APPOINTMENTS:  No follow-ups on file.   Orctho DR. Mora 7-10 days    DISCHARGE PLANNING: Home HH. Patient has 5 outdoor steps and 13 indoor steps.  He lives with his spouse.    Discussed with: RN / Nursing, Patient, MD, SW/ and Reviewed old records    Prognosis: fair     Spent 26 minutes total time, greater than 50% spent in face-to-face, reviewing records and coordination of care.    Caitlin Payne, CNP   fall precautions

## 2023-11-21 NOTE — PROGRESS NOTE ADULT - PROBLEM SELECTOR PLAN 2
Not on any medication.   Monitor blood pressure.
Day 1 S/P left hip hemiarthroplasty  C/W current pain management

## 2023-11-21 NOTE — PROGRESS NOTE ADULT - ASSESSMENT
_________________________________________________________________________________________  ========>>  M E D I C A L   A T T E N D I N G    F O L L O W  U P  N O T E  <<=========  -----------------------------------------------------------------------------------------------------    - Patient evaluated by me, chart reviewed.   - In summary,  CAROL ROY is a 78y year old woman admitted with fall, left hip fracture   - Patient today overall doing ok, comfortable, NPO, pain in control    ==================>> REVIEW OF SYSTEM <<=================    GEN: no fever, no chills, pain as above and in left ribs >> encouraged to take pain medications as needed ( patient hesitant )  RESP: no SOB, no cough, no sputum  CVS: no chest pain, no palpitations  GI: no abdominal pain, no nausea, no constipation, no diarrhea reported   : no dysuria, no frequency  Neuro: no headache, no dizziness ( patient denies passing out yesterday)     ==================>> PHYSICAL EXAM <<=================    GEN: A&O X 3 , NAD , comfortable, pleasant, calm in bed, getting carotid duplex   HEENT: NCAT, PERRL, MMM, hearing intact  CVS: S1S2 , regular , No M/R/G appreciated  PULM: CTA B/L,  no W/R/R appreciated  ABD.: soft. non tender, non distended,  bowel sounds present  Extrem: intact pulses , no edema     left foot internally rotated         ( Note written / Date of service 11-21-23 ( This is certified to be the same as "ENTERED" date above ( for billing purposes)))    ==================>> MEDICATIONS <<====================    acetaminophen     Tablet .. 1000 milliGRAM(s) Oral every 8 hours  enoxaparin Injectable 30 milliGRAM(s) SubCutaneous every 12 hours  polyethylene glycol 3350 17 Gram(s) Oral daily  povidone iodine 10% Solution 1 Application(s) Topical once  senna 2 Tablet(s) Oral at bedtime    MEDICATIONS  (PRN):  magnesium hydroxide Suspension 30 milliLiter(s) Oral daily PRN Constipation  melatonin 3 milliGRAM(s) Oral at bedtime PRN Insomnia  ondansetron Injectable 4 milliGRAM(s) IV Push every 8 hours PRN Nausea and/or Vomiting  oxyCODONE    IR 5 milliGRAM(s) Oral every 4 hours PRN Severe Pain (7 - 10)  oxyCODONE    IR 2.5 milliGRAM(s) Oral every 4 hours PRN Moderate Pain (4 - 6)    ___________  Active diet:  Diet, Regular:   Lacto-Ovo Veg (Accepts Milk Prod., Eggs)  ___________________    ==================>> VITAL SIGNS <<==================    Vital Signs Last 24 HrsT(C): 36.8 (11-21-23 @ 14:26)  T(F): 98.2 (11-21-23 @ 14:26), Max: 98.2 (11-21-23 @ 05:20)  HR: 63 (11-21-23 @ 14:26) (53 - 83)  BP: 104/59 (11-21-23 @ 14:26)  RR: 18 (11-21-23 @ 14:26) (10 - 22)  SpO2: 95% (11-21-23 @ 14:26) (94% - 100%)      CAPILLARY BLOOD GLUCOSE      POCT Blood Glucose.: 96 mg/dL (21 Nov 2023 08:00)     ==================>> LAB AND IMAGING <<==================                        13.4   9.31  )-----------( 170      ( 20 Nov 2023 20:43 )             39.6        11-20    137  |  104  |  19<H>  ----------------------------<  110<H>  4.0   |  28  |  0.63    Ca    8.7      20 Nov 2023 20:43  Phos  2.7     11-20  Mg     1.9     11-20      WBC count:   9.31 <<== ,  7.27 <<== ,  7.59 <<== ,  7.15 <<==   Hemoglobin:   13.4 <<==,  13.4 <<==,  13.5 <<==,  13.8 <<==  platelets:  170 <==, 180 <==, 205 <==, 218 <==    Creatinine:  0.63  <<==, 0.72  <<==, 0.68  <<==, 0.78  <<==  Sodium:   137  <==, 135  <==, 139  <==, 138  <==       AST:          26 <==      ALT:        37  <==      AP:        88  <=     Bili:        0.4  <=    ____________________________    M I C R O B I O L O G Y :        < from: Transthoracic Echocardiogram (11.20.23 @ 08:58) >  CONCLUSIONS:  1. Normal mitral valve. Chordal systolic anterior motion.  Trace mitral regurgitation.  2. Aortic valve not well visualized, cannot rule out  bicuspid aortic valve.  No aortic stenosis. Mild to  moderate eccentric aortic regurgitation jet.  3. Asymmetric thickening of the basal septum, max  thickening 1.4cm. Cardiac MRI can be considered for further  evaluation as clinically indicated.  4. Hyperdynamic left ventricular systolic function (EF  >70%).  5. Normal right ventricular size and systolic function  (TAPSE 3.8 cm).  6. Normal tricuspid valve. Mild tricuspid regurgitation.  7. Spherical hypoechoic area is incidentally noted in the  liver, probably representing hepatic cyst. Consider  dedicated hepatic ultrasound for further evaluation if clinically indicated.  < end of copied text >    carotid duplex negative preliminarily     ___________________________________________________________________________________  ===============>>  A S S E S S M E N T   A N D   P L A N <<===============  ------------------------------------------------------------------------------------------    · Assessment	  Patient is a 79 y/o F with who ambulates independently with  PMH of HTN and carotid stenosis and PSH of appendectomy presented after a fall. Patient reports that after her usual run in the park she was walking at a slower pace to cool down when she tripped and fell on her left side. Patient is being admitted for further management of left femoral neck fracture.     Problem/Plan - 1:  ·  Problem: Fall.   ·  Plan: P/w left hip and left chest pain. S/p fall.   Xray pelvis - left femoral neck fracture.   CT chest - No evidence of acute thoracic traumatic injury on this noncontrast CT chest.  Tylenol - mild pain.   Oxycodone 2.5 - moderate pain  Oxycodone 5.0 - severe pain.   Ortho consulted -  Left hip hemiarthroplasty on Monday, 11/20/2023.   PT consult after surgery.    Problem/Plan - 2:  ·  Problem: Fracture of femoral neck, left.   ·  Plan: P/w left hip and left chest pain. S/p fall.   Xray pelvis - left femoral neck fracture.  pain management as above  vitamin D level 72 !  will need Bone density scan as outpatient ( discussed with pt)   Ortho on board >> Sx today  PT consult after surgery.  medically stable for OR     Problem/Plan - 3:  ·  Problem: Hypertension.   ·  Plan: Not on any medication.   Monitor blood pressure.    Problem/Plan - 4:  ·  Problem: Prophylactic measure.   ·  Plan: SCDs.    --------------------------------------------  Case discussed with patient ,  at bedside   Education given on findings and plan of care  ___________________________  H. IFEOMA Padron.  Pager: [phone]       _________________________________________________________________________________________  ========>>  M E D I C A L   A T T E N D I N G    F O L L O W  U P  N O T E  <<=========  -----------------------------------------------------------------------------------------------------    - Patient evaluated by me, chart reviewed.   - In summary,  CAROL ROY is a 78y year old woman admitted with fall, left hip fracture   - Patient today overall doing ok, comfortable, NPO, pain in control    ==================>> REVIEW OF SYSTEM <<=================    GEN: no fever, no chills, pain as above and in left ribs >> encouraged to take pain medications as needed ( patient hesitant )  RESP: no SOB, no cough, no sputum  CVS: no chest pain, no palpitations  GI: no abdominal pain, no nausea, no constipation, no diarrhea reported   : no dysuria, no frequency  Neuro: no headache, no dizziness ( patient denies passing out yesterday)     ==================>> PHYSICAL EXAM <<=================    GEN: A&O X 3 , NAD , comfortable, pleasant, calm in bed, getting carotid duplex   HEENT: NCAT, PERRL, MMM, hearing intact  CVS: S1S2 , regular , No M/R/G appreciated  PULM: CTA B/L,  no W/R/R appreciated  ABD.: soft. non tender, non distended,  bowel sounds present  Extrem: intact pulses , no edema     left foot internally rotated         ( Note written / Date of service 11-21-23 ( This is certified to be the same as "ENTERED" date above ( for billing purposes)))    ==================>> MEDICATIONS <<====================    acetaminophen     Tablet .. 1000 milliGRAM(s) Oral every 8 hours  enoxaparin Injectable 30 milliGRAM(s) SubCutaneous every 12 hours  polyethylene glycol 3350 17 Gram(s) Oral daily  povidone iodine 10% Solution 1 Application(s) Topical once  senna 2 Tablet(s) Oral at bedtime    MEDICATIONS  (PRN):  magnesium hydroxide Suspension 30 milliLiter(s) Oral daily PRN Constipation  melatonin 3 milliGRAM(s) Oral at bedtime PRN Insomnia  ondansetron Injectable 4 milliGRAM(s) IV Push every 8 hours PRN Nausea and/or Vomiting  oxyCODONE    IR 5 milliGRAM(s) Oral every 4 hours PRN Severe Pain (7 - 10)  oxyCODONE    IR 2.5 milliGRAM(s) Oral every 4 hours PRN Moderate Pain (4 - 6)    ___________  Active diet:  Diet, Regular:   Lacto-Ovo Veg (Accepts Milk Prod., Eggs)  ___________________    ==================>> VITAL SIGNS <<==================    Vital Signs Last 24 HrsT(C): 36.8 (11-21-23 @ 14:26)  T(F): 98.2 (11-21-23 @ 14:26), Max: 98.2 (11-21-23 @ 05:20)  HR: 63 (11-21-23 @ 14:26) (53 - 83)  BP: 104/59 (11-21-23 @ 14:26)  RR: 18 (11-21-23 @ 14:26) (10 - 22)  SpO2: 95% (11-21-23 @ 14:26) (94% - 100%)      CAPILLARY BLOOD GLUCOSE      POCT Blood Glucose.: 96 mg/dL (21 Nov 2023 08:00)     ==================>> LAB AND IMAGING <<==================                        13.4   9.31  )-----------( 170      ( 20 Nov 2023 20:43 )             39.6        11-20    137  |  104  |  19<H>  ----------------------------<  110<H>  4.0   |  28  |  0.63    Ca    8.7      20 Nov 2023 20:43  Phos  2.7     11-20  Mg     1.9     11-20      WBC count:   9.31 <<== ,  7.27 <<== ,  7.59 <<== ,  7.15 <<==   Hemoglobin:   13.4 <<==,  13.4 <<==,  13.5 <<==,  13.8 <<==  platelets:  170 <==, 180 <==, 205 <==, 218 <==    Creatinine:  0.63  <<==, 0.72  <<==, 0.68  <<==, 0.78  <<==  Sodium:   137  <==, 135  <==, 139  <==, 138  <==       AST:          26 <==      ALT:        37  <==      AP:        88  <=     Bili:        0.4  <=    ____________________________    M I C R O B I O L O G Y :        < from: Transthoracic Echocardiogram (11.20.23 @ 08:58) >  CONCLUSIONS:  1. Normal mitral valve. Chordal systolic anterior motion.  Trace mitral regurgitation.  2. Aortic valve not well visualized, cannot rule out  bicuspid aortic valve.  No aortic stenosis. Mild to  moderate eccentric aortic regurgitation jet.  3. Asymmetric thickening of the basal septum, max  thickening 1.4cm. Cardiac MRI can be considered for further  evaluation as clinically indicated.  4. Hyperdynamic left ventricular systolic function (EF  >70%).  5. Normal right ventricular size and systolic function  (TAPSE 3.8 cm).  6. Normal tricuspid valve. Mild tricuspid regurgitation.  7. Spherical hypoechoic area is incidentally noted in the  liver, probably representing hepatic cyst. Consider  dedicated hepatic ultrasound for further evaluation if clinically indicated.  < end of copied text >    carotid duplex negative preliminarily     ___________________________________________________________________________________  ===============>>  A S S E S S M E N T   A N D   P L A N <<===============  ------------------------------------------------------------------------------------------    · Assessment	  Patient is a 79 y/o F with who ambulates independently with  PMH of HTN and carotid stenosis and PSH of appendectomy presented after a fall. Patient reports that after her usual run in the park she was walking at a slower pace to cool down when she tripped and fell on her left side. Patient is being admitted for further management of left femoral neck fracture.     Problem/Plan - 1:  ·  Problem: Fall.   ·  Plan: P/w left hip and left chest pain. S/p fall.   Xray pelvis - left femoral neck fracture.   CT chest - No evidence of acute thoracic traumatic injury on this noncontrast CT chest.  Tylenol - mild pain.   Oxycodone 2.5 - moderate pain  Oxycodone 5.0 - severe pain.   Ortho consulted -  Left hip hemiarthroplasty on Monday, 11/20/2023.   PT consult after surgery.    Problem/Plan - 2:  ·  Problem: Fracture of femoral neck, left.   ·  Plan: P/w left hip and left chest pain. S/p fall.   Xray pelvis - left femoral neck fracture.  pain management as above  vitamin D level 72 !  will need Bone density scan as outpatient ( discussed with pt)   Ortho on board >> Sx today  PT consult after surgery.  medically stable for OR     Problem/Plan - 3:  ·  Problem: Hypertension.   ·  Plan: Not on any medication.   Monitor blood pressure.    Problem/Plan - 4:  ·  Problem: Prophylactic measure.   ·  Plan: SCDs.    --------------------------------------------  Case discussed with patient ,  at bedside   Education given on findings and plan of care  ___________________________  H. IFEOMA Padron.  Pager: [phone]       _________________________________________________________________________________________  ========>>  M E D I C A L   A T T E N D I N G    F O L L O W  U P  N O T E  <<=========  -----------------------------------------------------------------------------------------------------    - Patient evaluated by me, chart reviewed.   - In summary,  CAROL ROY is a 78y year old woman admitted with fall, left hip fracture   - Patient today overall doing ok, comfortable, NPO, pain in control    ==================>> REVIEW OF SYSTEM <<=================    GEN: no fever, no chills, pain as above and in left ribs >> encouraged to take pain medications as needed ( patient hesitant )  RESP: no SOB, no cough, no sputum  CVS: no chest pain, no palpitations  GI: no abdominal pain, no nausea, no constipation, no diarrhea reported   : no dysuria, no frequency  Neuro: no headache, no dizziness ( patient denies passing out yesterday)     ==================>> PHYSICAL EXAM <<=================    GEN: A&O X 3 , NAD , comfortable, pleasant, calm in bed, getting carotid duplex   HEENT: NCAT, PERRL, MMM, hearing intact  CVS: S1S2 , regular , No M/R/G appreciated  PULM: CTA B/L,  no W/R/R appreciated  ABD.: soft. non tender, non distended,  bowel sounds present  Extrem: intact pulses , no edema     left foot internally rotated         ( Note written / Date of service 11-21-23 ( This is certified to be the same as "ENTERED" date above ( for billing purposes)))    ==================>> MEDICATIONS <<====================    acetaminophen     Tablet .. 1000 milliGRAM(s) Oral every 8 hours  enoxaparin Injectable 30 milliGRAM(s) SubCutaneous every 12 hours  polyethylene glycol 3350 17 Gram(s) Oral daily  povidone iodine 10% Solution 1 Application(s) Topical once  senna 2 Tablet(s) Oral at bedtime    MEDICATIONS  (PRN):  magnesium hydroxide Suspension 30 milliLiter(s) Oral daily PRN Constipation  melatonin 3 milliGRAM(s) Oral at bedtime PRN Insomnia  ondansetron Injectable 4 milliGRAM(s) IV Push every 8 hours PRN Nausea and/or Vomiting  oxyCODONE    IR 5 milliGRAM(s) Oral every 4 hours PRN Severe Pain (7 - 10)  oxyCODONE    IR 2.5 milliGRAM(s) Oral every 4 hours PRN Moderate Pain (4 - 6)    ___________  Active diet:  Diet, Regular:   Lacto-Ovo Veg (Accepts Milk Prod., Eggs)  ___________________    ==================>> VITAL SIGNS <<==================    Vital Signs Last 24 HrsT(C): 36.8 (11-21-23 @ 14:26)  T(F): 98.2 (11-21-23 @ 14:26), Max: 98.2 (11-21-23 @ 05:20)  HR: 63 (11-21-23 @ 14:26) (53 - 83)  BP: 104/59 (11-21-23 @ 14:26)  RR: 18 (11-21-23 @ 14:26) (10 - 22)  SpO2: 95% (11-21-23 @ 14:26) (94% - 100%)      CAPILLARY BLOOD GLUCOSE      POCT Blood Glucose.: 96 mg/dL (21 Nov 2023 08:00)     ==================>> LAB AND IMAGING <<==================                        13.4   9.31  )-----------( 170      ( 20 Nov 2023 20:43 )             39.6        11-20    137  |  104  |  19<H>  ----------------------------<  110<H>  4.0   |  28  |  0.63    Ca    8.7      20 Nov 2023 20:43  Phos  2.7     11-20  Mg     1.9     11-20      WBC count:   9.31 <<== ,  7.27 <<== ,  7.59 <<== ,  7.15 <<==   Hemoglobin:   13.4 <<==,  13.4 <<==,  13.5 <<==,  13.8 <<==  platelets:  170 <==, 180 <==, 205 <==, 218 <==    Creatinine:  0.63  <<==, 0.72  <<==, 0.68  <<==, 0.78  <<==  Sodium:   137  <==, 135  <==, 139  <==, 138  <==       AST:          26 <==      ALT:        37  <==      AP:        88  <=     Bili:        0.4  <=    ____________________________    M I C R O B I O L O G Y :        < from: Transthoracic Echocardiogram (11.20.23 @ 08:58) >  CONCLUSIONS:  1. Normal mitral valve. Chordal systolic anterior motion.  Trace mitral regurgitation.  2. Aortic valve not well visualized, cannot rule out  bicuspid aortic valve.  No aortic stenosis. Mild to  moderate eccentric aortic regurgitation jet.  3. Asymmetric thickening of the basal septum, max  thickening 1.4cm. Cardiac MRI can be considered for further  evaluation as clinically indicated.  4. Hyperdynamic left ventricular systolic function (EF  >70%).  5. Normal right ventricular size and systolic function  (TAPSE 3.8 cm).  6. Normal tricuspid valve. Mild tricuspid regurgitation.  7. Spherical hypoechoic area is incidentally noted in the  liver, probably representing hepatic cyst. Consider  dedicated hepatic ultrasound for further evaluation if clinically indicated.  < end of copied text >    carotid duplex negative preliminarily     ___________________________________________________________________________________  ===============>>  A S S E S S M E N T   A N D   P L A N <<===============  ------------------------------------------------------------------------------------------    · Assessment	  Patient is a 79 y/o F with who ambulates independently with  PMH of HTN and carotid stenosis and PSH of appendectomy presented after a fall. Patient reports that after her usual run in the park she was walking at a slower pace to cool down when she tripped and fell on her left side. Patient is being admitted for further management of left femoral neck fracture.     Problem/Plan - 1:  ·  Problem: Fall.   ·  Plan: P/w left hip and left chest pain. S/p fall.   Xray pelvis - left femoral neck fracture.   CT chest - No evidence of acute thoracic traumatic injury on this noncontrast CT chest.  Tylenol - mild pain.   Oxycodone 2.5 - moderate pain  Oxycodone 5.0 - severe pain.   Ortho consulted -  Left hip hemiarthroplasty on Monday, 11/20/2023.   PT consult after surgery.    Problem/Plan - 2:  ·  Problem: Fracture of femoral neck, left.   ·  Plan: P/w left hip and left chest pain. S/p fall.   Xray pelvis - left femoral neck fracture.  pain management as above  vitamin D level 72 !  will need Bone density scan as outpatient ( discussed with pt)   Ortho on board >> Sx today  PT consult after surgery.  medically stable for OR     Problem/Plan - 3:  ·  Problem: Hypertension.   ·  Plan: Not on any medication.   Monitor blood pressure.    Problem/Plan - 4:  ·  Problem: Prophylactic measure.   ·  Plan: SCDs.    --------------------------------------------  Case discussed with patient ,  at bedside   Education given on findings and plan of care  ___________________________  H. IFEOMA Padron.  Pager: [phone]       _________________________________________________________________________________________  ========>>  M E D I C A L   A T T E N D I N G    F O L L O W  U P  N O T E  <<=========  -----------------------------------------------------------------------------------------------------    - Patient evaluated by me, chart reviewed.   - In summary,  CAROL ROY is a 78y year old woman admitted with fall, left hip fracture   - Patient today overall doing ok, comfortable post op      Eating okay, pain controlled    ==================>> REVIEW OF SYSTEM <<=================    GEN: no fever, no chills, pain Overall controlled  RESP: no SOB, no cough, no sputum  CVS: no chest pain, no palpitations  GI: no abdominal pain, no nausea  : no dysuria, no frequency  Neuro: no headache, no dizziness ( patient denies passing out yesterday)     ==================>> PHYSICAL EXAM <<=================    GEN: A&O X 3 , NAD , comfortable, pleasant, calm in bed  HEENT: NCAT, PERRL, MMM, hearing intact  CVS: S1S2 , regular , No M/R/G appreciated  PULM: CTA B/L,  no W/R/R appreciated  ABD.: soft. non tender, non distended,  bowel sounds present  Extrem: intact pulses , no edema     left foot internally rotated         ( Note written / Date of service 11-21-23 ( This is certified to be the same as "ENTERED" date above ( for billing purposes)))    ==================>> MEDICATIONS <<====================    acetaminophen     Tablet .. 1000 milliGRAM(s) Oral every 8 hours  enoxaparin Injectable 30 milliGRAM(s) SubCutaneous every 12 hours  polyethylene glycol 3350 17 Gram(s) Oral daily  povidone iodine 10% Solution 1 Application(s) Topical once  senna 2 Tablet(s) Oral at bedtime    MEDICATIONS  (PRN):  magnesium hydroxide Suspension 30 milliLiter(s) Oral daily PRN Constipation  melatonin 3 milliGRAM(s) Oral at bedtime PRN Insomnia  ondansetron Injectable 4 milliGRAM(s) IV Push every 8 hours PRN Nausea and/or Vomiting  oxyCODONE    IR 5 milliGRAM(s) Oral every 4 hours PRN Severe Pain (7 - 10)  oxyCODONE    IR 2.5 milliGRAM(s) Oral every 4 hours PRN Moderate Pain (4 - 6)    ___________  Active diet:  Diet, Regular:   Lacto-Ovo Veg (Accepts Milk Prod., Eggs)  ___________________    ==================>> VITAL SIGNS <<==================    Vital Signs Last 24 HrsT(C): 36.8 (11-21-23 @ 14:26)  T(F): 98.2 (11-21-23 @ 14:26), Max: 98.2 (11-21-23 @ 05:20)  HR: 63 (11-21-23 @ 14:26) (53 - 83)  BP: 104/59 (11-21-23 @ 14:26)  RR: 18 (11-21-23 @ 14:26) (10 - 22)  SpO2: 95% (11-21-23 @ 14:26) (94% - 100%)      POCT Blood Glucose.: 96 mg/dL (21 Nov 2023 08:00)     ==================>> LAB AND IMAGING <<==================                        13.4   9.31  )-----------( 170      ( 20 Nov 2023 20:43 )             39.6        11-20    137  |  104  |  19<H>  ----------------------------<  110<H>  4.0   |  28  |  0.63    Ca    8.7      20 Nov 2023 20:43  Phos  2.7     11-20  Mg     1.9     11-20      WBC count:   9.31 <<== ,  7.27 <<== ,  7.59 <<== ,  7.15 <<==   Hemoglobin:   13.4 <<==,  13.4 <<==,  13.5 <<==,  13.8 <<==  platelets:  170 <==, 180 <==, 205 <==, 218 <==    Creatinine:  0.63  <<==, 0.72  <<==, 0.68  <<==, 0.78  <<==  Sodium:   137  <==, 135  <==, 139  <==, 138  <==    < from: Transthoracic Echocardiogram (11.20.23 @ 08:58) >  CONCLUSIONS:  1. Normal mitral valve. Chordal systolic anterior motion.  Trace mitral regurgitation.  2. Aortic valve not well visualized, cannot rule out  bicuspid aortic valve.  No aortic stenosis. Mild to  moderate eccentric aortic regurgitation jet.  3. Asymmetric thickening of the basal septum, max  thickening 1.4cm. Cardiac MRI can be considered for further  evaluation as clinically indicated.  4. Hyperdynamic left ventricular systolic function (EF  >70%).  5. Normal right ventricular size and systolic function  (TAPSE 3.8 cm).  6. Normal tricuspid valve. Mild tricuspid regurgitation.  7. Spherical hypoechoic area is incidentally noted in the  liver, probably representing hepatic cyst. Consider  dedicated hepatic ultrasound for further evaluation if clinically indicated.  < end of copied text >    carotid duplex negative     ___________________________________________________________________________________  ===============>>  A S S E S S M E N T   A N D   P L A N <<===============  ------------------------------------------------------------------------------------------    · Assessment	  Patient is a 77 y/o F with who ambulates independently with  PMH of HTN and carotid stenosis and PSH of appendectomy presented after a fall. Patient reports that after her usual run in the park she was walking at a slower pace to cool down when she tripped and fell on her left side. Patient is being admitted for further management of left femoral neck fracture.     Problem/Plan - 1:  ·  Problem: Fall.   ·  Plan: P/w left hip and left chest pain. S/p fall.   Ortho appreciated : post Left hip pinning   PT / OOB as able    home versus rehabilitation    Problem/Plan - 2:  ·  Problem: Fracture of femoral neck, left.   ·  Plan: P/w left hip and left chest pain. S/p fall.   Xray pelvis - left femoral neck fracture.  pain management as above  vitamin D level 72 !  will need Bone density scan as outpatient ( discussed with pt)   Ortho on board   PT     Problem/Plan - 3:  ·  Problem: Hypertension.   ·  Plan: Not on any medication.   Monitor blood pressure.    Problem/Plan - 4:  ·  Problem: Prophylactic measure.   ·  Plan: SCDs.    --------------------------------------------  Case discussed with patient   Education given on findings and plan of care  ___________________________  H. IFEOMA Pardon.  Pager: [phone]       _________________________________________________________________________________________  ========>>  M E D I C A L   A T T E N D I N G    F O L L O W  U P  N O T E  <<=========  -----------------------------------------------------------------------------------------------------    - Patient evaluated by me, chart reviewed.   - In summary,  CAROL ROY is a 78y year old woman admitted with fall, left hip fracture   - Patient today overall doing ok, comfortable post op      Eating okay, pain controlled    ==================>> REVIEW OF SYSTEM <<=================    GEN: no fever, no chills, pain Overall controlled  RESP: no SOB, no cough, no sputum  CVS: no chest pain, no palpitations  GI: no abdominal pain, no nausea  : no dysuria, no frequency  Neuro: no headache, no dizziness ( patient denies passing out yesterday)     ==================>> PHYSICAL EXAM <<=================    GEN: A&O X 3 , NAD , comfortable, pleasant, calm in bed  HEENT: NCAT, PERRL, MMM, hearing intact  CVS: S1S2 , regular , No M/R/G appreciated  PULM: CTA B/L,  no W/R/R appreciated  ABD.: soft. non tender, non distended,  bowel sounds present  Extrem: intact pulses , no edema     left foot internally rotated         ( Note written / Date of service 11-21-23 ( This is certified to be the same as "ENTERED" date above ( for billing purposes)))    ==================>> MEDICATIONS <<====================    acetaminophen     Tablet .. 1000 milliGRAM(s) Oral every 8 hours  enoxaparin Injectable 30 milliGRAM(s) SubCutaneous every 12 hours  polyethylene glycol 3350 17 Gram(s) Oral daily  povidone iodine 10% Solution 1 Application(s) Topical once  senna 2 Tablet(s) Oral at bedtime    MEDICATIONS  (PRN):  magnesium hydroxide Suspension 30 milliLiter(s) Oral daily PRN Constipation  melatonin 3 milliGRAM(s) Oral at bedtime PRN Insomnia  ondansetron Injectable 4 milliGRAM(s) IV Push every 8 hours PRN Nausea and/or Vomiting  oxyCODONE    IR 5 milliGRAM(s) Oral every 4 hours PRN Severe Pain (7 - 10)  oxyCODONE    IR 2.5 milliGRAM(s) Oral every 4 hours PRN Moderate Pain (4 - 6)    ___________  Active diet:  Diet, Regular:   Lacto-Ovo Veg (Accepts Milk Prod., Eggs)  ___________________    ==================>> VITAL SIGNS <<==================    Vital Signs Last 24 HrsT(C): 36.8 (11-21-23 @ 14:26)  T(F): 98.2 (11-21-23 @ 14:26), Max: 98.2 (11-21-23 @ 05:20)  HR: 63 (11-21-23 @ 14:26) (53 - 83)  BP: 104/59 (11-21-23 @ 14:26)  RR: 18 (11-21-23 @ 14:26) (10 - 22)  SpO2: 95% (11-21-23 @ 14:26) (94% - 100%)      POCT Blood Glucose.: 96 mg/dL (21 Nov 2023 08:00)     ==================>> LAB AND IMAGING <<==================                        13.4   9.31  )-----------( 170      ( 20 Nov 2023 20:43 )             39.6        11-20    137  |  104  |  19<H>  ----------------------------<  110<H>  4.0   |  28  |  0.63    Ca    8.7      20 Nov 2023 20:43  Phos  2.7     11-20  Mg     1.9     11-20      WBC count:   9.31 <<== ,  7.27 <<== ,  7.59 <<== ,  7.15 <<==   Hemoglobin:   13.4 <<==,  13.4 <<==,  13.5 <<==,  13.8 <<==  platelets:  170 <==, 180 <==, 205 <==, 218 <==    Creatinine:  0.63  <<==, 0.72  <<==, 0.68  <<==, 0.78  <<==  Sodium:   137  <==, 135  <==, 139  <==, 138  <==    < from: Transthoracic Echocardiogram (11.20.23 @ 08:58) >  CONCLUSIONS:  1. Normal mitral valve. Chordal systolic anterior motion.  Trace mitral regurgitation.  2. Aortic valve not well visualized, cannot rule out  bicuspid aortic valve.  No aortic stenosis. Mild to  moderate eccentric aortic regurgitation jet.  3. Asymmetric thickening of the basal septum, max  thickening 1.4cm. Cardiac MRI can be considered for further  evaluation as clinically indicated.  4. Hyperdynamic left ventricular systolic function (EF  >70%).  5. Normal right ventricular size and systolic function  (TAPSE 3.8 cm).  6. Normal tricuspid valve. Mild tricuspid regurgitation.  7. Spherical hypoechoic area is incidentally noted in the  liver, probably representing hepatic cyst. Consider  dedicated hepatic ultrasound for further evaluation if clinically indicated.  < end of copied text >    carotid duplex negative     ___________________________________________________________________________________  ===============>>  A S S E S S M E N T   A N D   P L A N <<===============  ------------------------------------------------------------------------------------------    · Assessment	  Patient is a 77 y/o F with who ambulates independently with  PMH of HTN and carotid stenosis and PSH of appendectomy presented after a fall. Patient reports that after her usual run in the park she was walking at a slower pace to cool down when she tripped and fell on her left side. Patient is being admitted for further management of left femoral neck fracture.     Problem/Plan - 1:  ·  Problem: Fall.   ·  Plan: P/w left hip and left chest pain. S/p fall.   Ortho appreciated : post Left hip pinning   PT / OOB as able    home versus rehabilitation    Problem/Plan - 2:  ·  Problem: Fracture of femoral neck, left.   ·  Plan: P/w left hip and left chest pain. S/p fall.   Xray pelvis - left femoral neck fracture.  pain management as above  vitamin D level 72 !  will need Bone density scan as outpatient ( discussed with pt)   Ortho on board   PT     Problem/Plan - 3:  ·  Problem: Hypertension.   ·  Plan: Not on any medication.   Monitor blood pressure.    Problem/Plan - 4:  ·  Problem: Prophylactic measure.   ·  Plan: SCDs.    --------------------------------------------  Case discussed with patient   Education given on findings and plan of care  ___________________________  H. IFEOMA Padron.  Pager: [phone]       _________________________________________________________________________________________  ========>>  M E D I C A L   A T T E N D I N G    F O L L O W  U P  N O T E  <<=========  -----------------------------------------------------------------------------------------------------    - Patient evaluated by me, chart reviewed.   - In summary,  CAROL ROY is a 78y year old woman admitted with fall, left hip fracture   - Patient today overall doing ok, comfortable post op      Eating okay, pain controlled    ==================>> REVIEW OF SYSTEM <<=================    GEN: no fever, no chills, pain Overall controlled  RESP: no SOB, no cough, no sputum  CVS: no chest pain, no palpitations  GI: no abdominal pain, no nausea  : no dysuria, no frequency  Neuro: no headache, no dizziness ( patient denies passing out yesterday)     ==================>> PHYSICAL EXAM <<=================    GEN: A&O X 3 , NAD , comfortable, pleasant, calm in bed  HEENT: NCAT, PERRL, MMM, hearing intact  CVS: S1S2 , regular , No M/R/G appreciated  PULM: CTA B/L,  no W/R/R appreciated  ABD.: soft. non tender, non distended,  bowel sounds present  Extrem: intact pulses , no edema     left foot internally rotated         ( Note written / Date of service 11-21-23 ( This is certified to be the same as "ENTERED" date above ( for billing purposes)))    ==================>> MEDICATIONS <<====================    acetaminophen     Tablet .. 1000 milliGRAM(s) Oral every 8 hours  enoxaparin Injectable 30 milliGRAM(s) SubCutaneous every 12 hours  polyethylene glycol 3350 17 Gram(s) Oral daily  povidone iodine 10% Solution 1 Application(s) Topical once  senna 2 Tablet(s) Oral at bedtime    MEDICATIONS  (PRN):  magnesium hydroxide Suspension 30 milliLiter(s) Oral daily PRN Constipation  melatonin 3 milliGRAM(s) Oral at bedtime PRN Insomnia  ondansetron Injectable 4 milliGRAM(s) IV Push every 8 hours PRN Nausea and/or Vomiting  oxyCODONE    IR 5 milliGRAM(s) Oral every 4 hours PRN Severe Pain (7 - 10)  oxyCODONE    IR 2.5 milliGRAM(s) Oral every 4 hours PRN Moderate Pain (4 - 6)    ___________  Active diet:  Diet, Regular:   Lacto-Ovo Veg (Accepts Milk Prod., Eggs)  ___________________    ==================>> VITAL SIGNS <<==================    Vital Signs Last 24 HrsT(C): 36.8 (11-21-23 @ 14:26)  T(F): 98.2 (11-21-23 @ 14:26), Max: 98.2 (11-21-23 @ 05:20)  HR: 63 (11-21-23 @ 14:26) (53 - 83)  BP: 104/59 (11-21-23 @ 14:26)  RR: 18 (11-21-23 @ 14:26) (10 - 22)  SpO2: 95% (11-21-23 @ 14:26) (94% - 100%)      POCT Blood Glucose.: 96 mg/dL (21 Nov 2023 08:00)     ==================>> LAB AND IMAGING <<==================                        13.4   9.31  )-----------( 170      ( 20 Nov 2023 20:43 )             39.6        11-20    137  |  104  |  19<H>  ----------------------------<  110<H>  4.0   |  28  |  0.63    Ca    8.7      20 Nov 2023 20:43  Phos  2.7     11-20  Mg     1.9     11-20      WBC count:   9.31 <<== ,  7.27 <<== ,  7.59 <<== ,  7.15 <<==   Hemoglobin:   13.4 <<==,  13.4 <<==,  13.5 <<==,  13.8 <<==  platelets:  170 <==, 180 <==, 205 <==, 218 <==    Creatinine:  0.63  <<==, 0.72  <<==, 0.68  <<==, 0.78  <<==  Sodium:   137  <==, 135  <==, 139  <==, 138  <==    < from: Transthoracic Echocardiogram (11.20.23 @ 08:58) >  CONCLUSIONS:  1. Normal mitral valve. Chordal systolic anterior motion.  Trace mitral regurgitation.  2. Aortic valve not well visualized, cannot rule out  bicuspid aortic valve.  No aortic stenosis. Mild to  moderate eccentric aortic regurgitation jet.  3. Asymmetric thickening of the basal septum, max  thickening 1.4cm. Cardiac MRI can be considered for further  evaluation as clinically indicated.  4. Hyperdynamic left ventricular systolic function (EF  >70%).  5. Normal right ventricular size and systolic function  (TAPSE 3.8 cm).  6. Normal tricuspid valve. Mild tricuspid regurgitation.  7. Spherical hypoechoic area is incidentally noted in the  liver, probably representing hepatic cyst. Consider  dedicated hepatic ultrasound for further evaluation if clinically indicated.  < end of copied text >    carotid duplex negative     ___________________________________________________________________________________  ===============>>  A S S E S S M E N T   A N D   P L A N <<===============  ------------------------------------------------------------------------------------------    · Assessment	  Patient is a 77 y/o F with who ambulates independently with  PMH of HTN and carotid stenosis and PSH of appendectomy presented after a fall. Patient reports that after her usual run in the park she was walking at a slower pace to cool down when she tripped and fell on her left side. Patient is being admitted for further management of left femoral neck fracture.     Problem/Plan - 1:  ·  Problem: Fall.   ·  Plan: P/w left hip and left chest pain. S/p fall.   Ortho appreciated : post Left hip pinning   PT / OOB as able    home versus rehabilitation    Problem/Plan - 2:  ·  Problem: Fracture of femoral neck, left.   ·  Plan: P/w left hip and left chest pain. S/p fall.   Xray pelvis - left femoral neck fracture.  pain management as above  vitamin D level 72 !  will need Bone density scan as outpatient ( discussed with pt)   Ortho on board   PT     Problem/Plan - 3:  ·  Problem: Hypertension.   ·  Plan: Not on any medication.   Monitor blood pressure.    Problem/Plan - 4:  ·  Problem: Prophylactic measure.   ·  Plan: SCDs.    --------------------------------------------  Case discussed with patient   Education given on findings and plan of care  ___________________________  H. IFEOMA Padron.  Pager: [phone]

## 2023-11-21 NOTE — PHYSICAL THERAPY INITIAL EVALUATION ADULT - PATIENT/FAMILY/SIGNIFICANT OTHER GOALS STATEMENT, PT EVAL
will be able to perform usual mobility and ADLs independently and pain-free without an assistive device

## 2023-11-21 NOTE — PHYSICAL THERAPY INITIAL EVALUATION ADULT - ADDITIONAL COMMENTS
Otolaryngologist Procedure Text (C): After obtaining clear surgical margins the patient was sent to otolaryngology for surgical repair.  The patient understands they will receive post-surgical care and follow-up from the referring physician's office. no assistive device used; usually a very active person and walks between 6-8 miles per day

## 2023-11-21 NOTE — PHYSICAL THERAPY INITIAL EVALUATION ADULT - DIAGNOSIS, PT EVAL
s/p fall in community; s/p percutaneous pinning of left hip POD#1; generalized weakness; left hip pain and swelling s/p surgery; difficulty performing bed mobility, transfers, and ambulation

## 2023-11-21 NOTE — PROGRESS NOTE ADULT - SUBJECTIVE AND OBJECTIVE BOX
Patient is a 78y old  Female who presents with a chief complaint of S/p Left Hip pinning POD#1 (21 Nov 2023 09:14)      INTERVAL HPI/OVERNIGHT EVENTS: Pt seen at bedside awake and alert. She is Day 1 S/P left hip hemiarthroplasty. She c/o no BM in 4 days., She has no other complaints and no acute events from overnight.    MEDICATIONS  (STANDING):  acetaminophen     Tablet .. 1000 milliGRAM(s) Oral every 8 hours  enoxaparin Injectable 30 milliGRAM(s) SubCutaneous every 12 hours  polyethylene glycol 3350 17 Gram(s) Oral daily  povidone iodine 10% Solution 1 Application(s) Topical once  senna 2 Tablet(s) Oral at bedtime    MEDICATIONS  (PRN):  magnesium hydroxide Suspension 30 milliLiter(s) Oral daily PRN Constipation  melatonin 3 milliGRAM(s) Oral at bedtime PRN Insomnia  ondansetron Injectable 4 milliGRAM(s) IV Push every 8 hours PRN Nausea and/or Vomiting  oxyCODONE    IR 5 milliGRAM(s) Oral every 4 hours PRN Severe Pain (7 - 10)  oxyCODONE    IR 2.5 milliGRAM(s) Oral every 4 hours PRN Moderate Pain (4 - 6)      __________________________________________________  REVIEW OF SYSTEMS:    CONSTITUTIONAL: No fever,   EYES: no acute visual disturbances  NECK: No pain or stiffness  RESPIRATORY: No cough; No shortness of breath  CARDIOVASCULAR: No chest pain, no palpitations  GASTROINTESTINAL: No pain. No nausea or vomiting; No diarrhea   NEUROLOGICAL: No headache or numbness, no tremors  MUSCULOSKELETAL: No joint pain, no muscle pain  GENITOURINARY: no dysuria, no frequency, no hesitancy  PSYCHIATRY: no depression , no anxiety  ALL OTHER  ROS negative      Vital Signs Last 24 Hrs  T(C): 36.8 (21 Nov 2023 05:20), Max: 36.8 (21 Nov 2023 05:20)  T(F): 98.2 (21 Nov 2023 05:20), Max: 98.2 (21 Nov 2023 05:20)  HR: 56 (21 Nov 2023 05:20) (53 - 72)  BP: 142/66 (21 Nov 2023 05:20) (122/56 - 153/75)  BP(mean): 91 (21 Nov 2023 05:20) (71 - 91)  RR: 18 (21 Nov 2023 05:20) (10 - 22)  SpO2: 97% (21 Nov 2023 05:20) (95% - 100%)    Parameters below as of 21 Nov 2023 05:20  Patient On (Oxygen Delivery Method): room air    ________________________________________________  PHYSICAL EXAM:  GENERAL: NAD  HEENT: Normocephalic;  conjunctivae and sclerae clear; moist mucous membranes;   NECK : supple  CHEST/LUNG: Clear to auscultation bilaterally with good air entry   HEART: S1 S2  regular; no murmurs, gallops or rubs  ABDOMEN: Soft, Nontender, Nondistended; Bowel sounds present  EXTREMITIES: no cyanosis; no edema; no calf tenderness, + pedal pulses  SKIN: Surgical incision left hip, Dressing intact and dry  NERVOUS SYSTEM:  Awake and alert; Oriented  to place, person and time ; no new deficits    _________________________________________________  LABS:                        13.4   9.31  )-----------( 170      ( 20 Nov 2023 20:43 )             39.6     11-20    137  |  104  |  19<H>  ----------------------------<  110<H>  4.0   |  28  |  0.63    Ca    8.7      20 Nov 2023 20:43  Phos  2.7     11-20  Mg     1.9     11-20      PT/INR - ( 20 Nov 2023 04:58 )   PT: 11.2 sec;   INR: 0.98 ratio         PTT - ( 20 Nov 2023 04:58 )  PTT:28.6 sec  Urinalysis Basic - ( 20 Nov 2023 20:43 )    Color: x / Appearance: x / SG: x / pH: x  Gluc: 110 mg/dL / Ketone: x  / Bili: x / Urobili: x   Blood: x / Protein: x / Nitrite: x   Leuk Esterase: x / RBC: x / WBC x   Sq Epi: x / Non Sq Epi: x / Bacteria: x      CAPILLARY BLOOD GLUCOSE      POCT Blood Glucose.: 96 mg/dL (21 Nov 2023 08:00)      RADIOLOGY & ADDITIONAL TESTS:  < from: CT Hip No Cont, Left (11.20.23 @ 00:54) >  INTERPRETATION:  CLINICAL INDICATION: Fall, assess left hip fracture.    TECHNIQUE: CT axial images of the left hip were obtained without   intravenous contrast. Coronal and sagittal reformatted images were also   obtained.    CONTRAST/COMPLICATIONS:  IV Contrast: NONE  Complications: None reported at time of study completion    COMPARISON: XR: 11/18/2023. CT: None. MR: None.    FINDINGS: Evaluation of soft tissue is limited without intravenous   contrast.    Bones: Decreased bone mineralization. Acute impacted left femoral neck   fracture with superomedial displacement. No dislocation    Degenerative changes of the visualized left sacroiliac joint, pubic   symphysis and left hip.    Soft tissue: Stranding at the left lateral hip superficial soft tissue,   likely edema/ecchymosis. Stranding at the deep left hip/proximal thigh   soft tissue. Diffuse muscle bulk loss with fatty replacement. Left hip   hemarthrosis. Chondrocalcinosis at the left hip and pubic symphysis.    Additional: Colon diverticulosis. Atherosclerosis.    IMPRESSION:    Acute impacted left femoral neck fracture.    --- End of Report ---    < end of copied text >  < from: US Duplex Carotid Arteries Complete, Bilateral (11.19.23 @ 11:14) >  INTERPRETATION:  CLINICAL INFORMATION: Medical evaluate for stenosis    COMPARISON: None available.    TECHNIQUE: Grayscale, colorand spectral Doppler examination of both   carotid arteries was performed.    FINDINGS:    No elevated velocities or abnormal waveforms are encountered.    Peak systolic velocities are as follows:    RIGHT:  PROX CCA = 78 cm/s  DIST CCA = 65 cm/s  PROX ICA = 38 cm/s  DIST ICA = 71 cm/s  ECA = 96 cm/s    LEFT:  PROX CCA = 85 cm/s  DIST CCA = 75 cm/s  PROX ICA = 73 cm/s  DIST ICA = 95 cm/s  ECA = 98 cm/s    Antegrade flow is noted within both vertebral arteries.    IMPRESSION: No significant hemodynamic stenosis of either carotid artery.    Measurement of carotid stenosis is based on velocity parameters that   correlate the residual internal carotid diameter with that of the more   distal vessel in accordance with a method such as the [place] [employer]   Symptomatic Carotid Endarterectomy Trial (NASCET).    < end of copied text >  < from: CT Chest No Cont (11.18.23 @ 11:11) >  INTERPRETATION:  HISTORY: Chest wall pain after fall.    EXAMINATION: CT CHEST was performed without IV contrast.    COMPARISON: None available.    FINDINGS:    AIRWAYS, LUNGS, PLEURA: Clear central airways. No consolidation.   Nonspecific right apical 0.6 cm ground-glass nodule with focal 0.3 cm   solid component (image 69, series 2). No pleural effusion.    MEDIASTINUM: Cardiomegaly. No pericardial effusion. Thoracic aorta normal   caliber.  No large mediastinal lymph nodes.    IMAGED ABDOMEN: Right hepatic lobe hypodensities, likely cysts.    SOFT TISSUES: Unremarkable.    BONES: No acute osseous abnormality.      IMPRESSION:.    No evidence of acute thoracic traumatic injury on this noncontrast CT   chest.    Nonspecific right apical 0.6 upper ground-glass nodule with focal 0.3 cm   solid component. Recommend CT chest follow-up in 3 months to assess   stability.      < end of copied text >      Imaging Personally Reviewed:  YES    Consultant(s) Notes Reviewed:   YES    Care Discussed with Consultants :     Plan of care was discussed with patient and /or primary care giver; all questions and concerns were addressed and care was aligned with patient's wishes.

## 2023-11-21 NOTE — PHYSICAL THERAPY INITIAL EVALUATION ADULT - GAIT DEVIATIONS NOTED, PT EVAL
decreased abdirahman/decreased step length/decreased stride length/decreased weight-shifting ability

## 2023-11-21 NOTE — PROGRESS NOTE ADULT - SUBJECTIVE AND OBJECTIVE BOX
Source of information: CAROL ROY, Chart review  Patient language: English  : n/a    HPI:  Patient is a 79 y/o F with who ambulates independently with  PMH of HTN and carotid stenosis and PSH of appendectomy presented after a fall.   Patient reports that after her usual run in the park she was walking at a slower pace to cool down when she tripped and fell on her left side. Patient reports she did not have any lightheadedness, or dizziness before the fall. Patient also denies hitting her head or losing consciousness. Patient reports that after the fall she had sharp pain in her left hip and left chest. Patient reports after the fall some passerby tried to help him get up but she was not able to stand back up.   Patient denies any recent fevers, chills, weakness, fatigue, malaise, headache, dizziness, lightheadedness, chest pain, palpitations, shortness of breath, cough, nausea, vomiting, abdominal pain, diarrhea, constipation, melena, hematochezia, dysuria, urinary frequency, or urgency. Patient denies any other complains at this time.   Patient reports taking multiple vitamins/supplements like vitamin D, calcium, magnesium, chondroitin sulphate, and glucosamine. Patient denies history of osteoporosis.     [attending addendum: discussed with pt's PMD / cardiologist, Dr Burton, patient with history of a carotid plaque Aortic insufficiency ( moderate) and possible CVA/TIA ( in [place])) but has not had any recent visit or cardiovascular workup.. >> Echo and carotic ordered)]      (18 Nov 2023 14:16)      Pt is admitted for s/p Fall. GOO. Found to have left Femoral neck fracture. S/p percutaneous pinning of left hip on 11/20 POD #1.  Pain consulted for left hip pain.  Pt seen and examined at bedside. Reports pain score 5/10 on left hip when at rest and not moving, tolerable. But when moving left leg pain is 7-8/10 not tolerable. SCALE USED: (1-10 VNRS). Pt describes pain as aching and radiating to LLE, alleviated by pain medication and exacerbated by movement. Pt tolerating PO diet. Denies lethargy, nausea, vomiting, constipation, itchiness. Reports last BM 11/18. Patient stated goal for pain control: to be able to take deep breaths, get out of bed to chair and ambulate with tolerable pain control. Pt has not yet been out of bed postop. Pt reports taking Advil for pain at home.       PAST MEDICAL & SURGICAL HISTORY:      FAMILY HISTORY:    Social History:  Patient denies smoking, alcohol use, or illicit drug use. (18 Nov 2023 14:16)   [x] Denies ETOH use, illicit drug use and smoking    Allergies    No Known Allergies    MEDICATIONS  (STANDING):  acetaminophen     Tablet .. 1000 milliGRAM(s) Oral every 8 hours  enoxaparin Injectable 30 milliGRAM(s) SubCutaneous every 12 hours  polyethylene glycol 3350 17 Gram(s) Oral daily  povidone iodine 10% Solution 1 Application(s) Topical once  senna 2 Tablet(s) Oral at bedtime    MEDICATIONS  (PRN):  magnesium hydroxide Suspension 30 milliLiter(s) Oral daily PRN Constipation  melatonin 3 milliGRAM(s) Oral at bedtime PRN Insomnia  ondansetron Injectable 4 milliGRAM(s) IV Push every 8 hours PRN Nausea and/or Vomiting  oxyCODONE    IR 5 milliGRAM(s) Oral every 4 hours PRN Severe Pain (7 - 10)  oxyCODONE    IR 2.5 milliGRAM(s) Oral every 4 hours PRN Moderate Pain (4 - 6)      Vital Signs Last 24 Hrs  T(C): 36.8 (21 Nov 2023 05:20), Max: 36.8 (21 Nov 2023 05:20)  T(F): 98.2 (21 Nov 2023 05:20), Max: 98.2 (21 Nov 2023 05:20)  HR: 56 (21 Nov 2023 05:20) (53 - 72)  BP: 142/66 (21 Nov 2023 05:20) (122/56 - 153/75)  BP(mean): 91 (21 Nov 2023 05:20) (71 - 91)  RR: 18 (21 Nov 2023 05:20) (10 - 22)  SpO2: 97% (21 Nov 2023 05:20) (95% - 100%)    Parameters below as of 21 Nov 2023 05:20  Patient On (Oxygen Delivery Method): room air        LABS: Reviewed                          13.4   9.31  )-----------( 170      ( 20 Nov 2023 20:43 )             39.6     11-20    137  |  104  |  19<H>  ----------------------------<  110<H>  4.0   |  28  |  0.63    Ca    8.7      20 Nov 2023 20:43  Phos  2.7     11-20  Mg     1.9     11-20      PT/INR - ( 20 Nov 2023 04:58 )   PT: 11.2 sec;   INR: 0.98 ratio         PTT - ( 20 Nov 2023 04:58 )  PTT:28.6 sec    Urinalysis Basic - ( 20 Nov 2023 20:43 )    Color: x / Appearance: x / SG: x / pH: x  Gluc: 110 mg/dL / Ketone: x  / Bili: x / Urobili: x   Blood: x / Protein: x / Nitrite: x   Leuk Esterase: x / RBC: x / WBC x   Sq Epi: x / Non Sq Epi: x / Bacteria: x      CAPILLARY BLOOD GLUCOSE      POCT Blood Glucose.: 96 mg/dL (21 Nov 2023 08:00)    Radiology: Reviewed.     ACC: [id number] EXAM:  CT HIP ONLY LT   ORDERED BY: FRITZ TORRES     PROCEDURE DATE:  11/20/2023          INTERPRETATION:  CLINICAL INDICATION: Fall, assess left hip fracture.    TECHNIQUE: CT axial images of the left hip were obtained without   intravenous contrast. Coronal and sagittal reformatted images were also   obtained.    CONTRAST/COMPLICATIONS:  IV Contrast: NONE  Complications: None reported at time of study completion    COMPARISON: XR: 11/18/2023. CT: None. MR: None.    FINDINGS: Evaluation of soft tissue is limited without intravenous   contrast.    Bones: Decreased bone mineralization. Acute impacted left femoral neck   fracture with superomedial displacement. No dislocation    Degenerative changes of the visualized left sacroiliac joint, pubic   symphysis and left hip.    Soft tissue: Stranding at the left lateral hip superficial soft tissue,   likely edema/ecchymosis. Stranding at the deep left hip/proximal thigh   soft tissue. Diffuse muscle bulk loss with fatty replacement. Left hip   hemarthrosis. Chondrocalcinosis at the left hip and pubic symphysis.    Additional: Colon diverticulosis. Atherosclerosis.    IMPRESSION:    Acute impacted left femoral neck fracture.    --- End of Report ---    DAVE TENORIO MD; Attending Radiologist  This document has been electronically signed. Nov 20 2023  1:27AM  ACC: [id number] EXAM:  CT CHEST   ORDERED BY: MERA TIPTON     PROCEDURE DATE:  11/18/2023      INTERPRETATION:  HISTORY: Chest wall pain after fall.    EXAMINATION: CT CHEST was performed without IV contrast.    COMPARISON: None available.    FINDINGS:    AIRWAYS, LUNGS, PLEURA: Clear central airways. No consolidation.   Nonspecific right apical 0.6 cm ground-glass nodule with focal 0.3 cm   solid component (image 69, series 2). No pleural effusion.    MEDIASTINUM: Cardiomegaly. No pericardial effusion. Thoracic aorta normal   caliber.  No large mediastinal lymph nodes.    IMAGED ABDOMEN: Right hepatic lobe hypodensities, likely cysts.    SOFT TISSUES: Unremarkable.    BONES: No acute osseous abnormality.    IMPRESSION:.    No evidence of acute thoracic traumatic injury on this noncontrast CT   chest.    Nonspecific right apical 0.6 upper ground-glass nodule with focal 0.3 cm   solid component. Recommend CT chest follow-up in 3 months to assess   stability.    --- End of Report ---     DEJON RAMÍREZ MD; Attending Radiologist  This document has been electronically signed. Nov 18 2023 11:20AM    ORT Score -   Family Hx of substance abuse	Female	      Male  Alcohol 	                                           1                     3  Illegal drugs	                                   2                     3  Rx drugs                                           4 	                  4  Personal Hx of substance abuse		  Alcohol 	                                          3	                  3  Illegal drugs                                     4	                  4  Rx drugs                                            5 	                  5  Age between 16- 45 years	           1                     1  hx preadolescent sexual abuse	   3 	                  0  Psychological disease		  ADD, OCD, bipolar, schizophrenia   2	          2  Depression                                           1 	          1  Total: 0    a score of 3 or lower indicates low risk for opioid abuse		  a score of 4-7 indicates moderate risk for opioid abuse		  a score of 8 or higher indicates high risk for opioid abuse  	  REVIEW OF SYSTEMS:  CONSTITUTIONAL: No fever or fatigue  RESPIRATORY: No cough, wheezing, chills or hemoptysis; No shortness of breath  CARDIOVASCULAR: No chest pain, palpitations, dizziness, or leg swelling  GASTROINTESTINAL: No loss of appetite, decreased PO intake. No abdominal or epigastric pain. No nausea, vomiting; No diarrhea or constipation.   GENITOURINARY: No dysuria, frequency, hematuria, retention or incontinence  MUSCULOSKELETAL: + Left hip pain and swelling; No muscle, back, pain, no upper muscle strength weakness, no saddle anesthesia, bowel/bladder incontinence, + falls.   NEURO: No headaches, No numbness/tingling b/l LE, No weakness    PHYSICAL EXAM:  GENERAL:  Alert & Oriented X4, cooperative, NAD, Good concentration. Speech is clear.   RESPIRATORY: Respirations even and unlabored. Clear to auscultation bilaterally; No rales, rhonchi, wheezing, or rubs  CARDIOVASCULAR: Normal S1/S2, regular rate and rhythm; No murmurs, rubs, or gallops. No JVD.   GASTROINTESTINAL:  Soft, Nontender, Nondistended; Bowel sounds present. NPO order in place.  PERIPHERAL VASCULAR:  Extremities warm with left hip edema. 2+ Peripheral Pulses, No cyanosis, No calf tenderness  MUSCULOSKELETAL: Motor Strength 5/5 B/L upper and 2/5 lower extremities; ROM decreased to LLE due to pain; + tenderness on palpation of left hip.  SKIN: Warm, dry, intact. No rashes, lesions, scars or wounds. + left hip surgical dressing c/d/i     Risk factors associated with adverse outcomes related to opioid treatment  [ ]  Concurrent benzodiazepine use  [ ]  History/ Active substance use or alcohol use disorder  [ ] Psychiatric co-morbidity  [ ] Sleep apnea  [ ] COPD  [ ] BMI> 35  [ ] Liver dysfunction  [ ] Renal dysfunction  [ ] CHF  [ ] Smoker  [x]  Age > 60 years    [x]  [place]  Reviewed and Copied to Chart. See below.    Plan of care and goal oriented pain management treatment options were discussed with patient and /or primary care giver; all questions and concerns were addressed and care was aligned with patient's wishes.    Educated patient on goal oriented pain management treatment options     11-21-23 @ 10:50 Source of information: CAROL ROY, Chart review  Patient language: English  : n/a    HPI:  Patient is a 77 y/o F with who ambulates independently with  PMH of HTN and carotid stenosis and PSH of appendectomy presented after a fall.   Patient reports that after her usual run in the park she was walking at a slower pace to cool down when she tripped and fell on her left side. Patient reports she did not have any lightheadedness, or dizziness before the fall. Patient also denies hitting her head or losing consciousness. Patient reports that after the fall she had sharp pain in her left hip and left chest. Patient reports after the fall some passerby tried to help him get up but she was not able to stand back up.   Patient denies any recent fevers, chills, weakness, fatigue, malaise, headache, dizziness, lightheadedness, chest pain, palpitations, shortness of breath, cough, nausea, vomiting, abdominal pain, diarrhea, constipation, melena, hematochezia, dysuria, urinary frequency, or urgency. Patient denies any other complains at this time.   Patient reports taking multiple vitamins/supplements like vitamin D, calcium, magnesium, chondroitin sulphate, and glucosamine. Patient denies history of osteoporosis.     [attending addendum: discussed with pt's PMD / cardiologist, Dr Burton, patient with history of a carotid plaque Aortic insufficiency ( moderate) and possible CVA/TIA ( in [place])) but has not had any recent visit or cardiovascular workup.. >> Echo and carotic ordered)]      (18 Nov 2023 14:16)      Pt is admitted for s/p Fall. GOO. Found to have left Femoral neck fracture. S/p percutaneous pinning of left hip on 11/20 POD #1.  Pain consulted for left hip pain.  Pt seen and examined at bedside. Reports pain score 5/10 on left hip when at rest and not moving, tolerable. But when moving left leg pain is 7-8/10 not tolerable. SCALE USED: (1-10 VNRS). Pt describes pain as aching and radiating to LLE, alleviated by pain medication and exacerbated by movement. Pt tolerating PO diet. Denies lethargy, nausea, vomiting, constipation, itchiness. Reports last BM 11/18. Patient stated goal for pain control: to be able to take deep breaths, get out of bed to chair and ambulate with tolerable pain control. Pt has not yet been out of bed postop. Pt reports taking Advil for pain at home.       PAST MEDICAL & SURGICAL HISTORY:      FAMILY HISTORY:    Social History:  Patient denies smoking, alcohol use, or illicit drug use. (18 Nov 2023 14:16)   [x] Denies ETOH use, illicit drug use and smoking    Allergies    No Known Allergies    MEDICATIONS  (STANDING):  acetaminophen     Tablet .. 1000 milliGRAM(s) Oral every 8 hours  enoxaparin Injectable 30 milliGRAM(s) SubCutaneous every 12 hours  polyethylene glycol 3350 17 Gram(s) Oral daily  povidone iodine 10% Solution 1 Application(s) Topical once  senna 2 Tablet(s) Oral at bedtime    MEDICATIONS  (PRN):  magnesium hydroxide Suspension 30 milliLiter(s) Oral daily PRN Constipation  melatonin 3 milliGRAM(s) Oral at bedtime PRN Insomnia  ondansetron Injectable 4 milliGRAM(s) IV Push every 8 hours PRN Nausea and/or Vomiting  oxyCODONE    IR 5 milliGRAM(s) Oral every 4 hours PRN Severe Pain (7 - 10)  oxyCODONE    IR 2.5 milliGRAM(s) Oral every 4 hours PRN Moderate Pain (4 - 6)      Vital Signs Last 24 Hrs  T(C): 36.8 (21 Nov 2023 05:20), Max: 36.8 (21 Nov 2023 05:20)  T(F): 98.2 (21 Nov 2023 05:20), Max: 98.2 (21 Nov 2023 05:20)  HR: 56 (21 Nov 2023 05:20) (53 - 72)  BP: 142/66 (21 Nov 2023 05:20) (122/56 - 153/75)  BP(mean): 91 (21 Nov 2023 05:20) (71 - 91)  RR: 18 (21 Nov 2023 05:20) (10 - 22)  SpO2: 97% (21 Nov 2023 05:20) (95% - 100%)    Parameters below as of 21 Nov 2023 05:20  Patient On (Oxygen Delivery Method): room air        LABS: Reviewed                          13.4   9.31  )-----------( 170      ( 20 Nov 2023 20:43 )             39.6     11-20    137  |  104  |  19<H>  ----------------------------<  110<H>  4.0   |  28  |  0.63    Ca    8.7      20 Nov 2023 20:43  Phos  2.7     11-20  Mg     1.9     11-20      PT/INR - ( 20 Nov 2023 04:58 )   PT: 11.2 sec;   INR: 0.98 ratio         PTT - ( 20 Nov 2023 04:58 )  PTT:28.6 sec    Urinalysis Basic - ( 20 Nov 2023 20:43 )    Color: x / Appearance: x / SG: x / pH: x  Gluc: 110 mg/dL / Ketone: x  / Bili: x / Urobili: x   Blood: x / Protein: x / Nitrite: x   Leuk Esterase: x / RBC: x / WBC x   Sq Epi: x / Non Sq Epi: x / Bacteria: x      CAPILLARY BLOOD GLUCOSE      POCT Blood Glucose.: 96 mg/dL (21 Nov 2023 08:00)    Radiology: Reviewed.     ACC: [id number] EXAM:  CT HIP ONLY LT   ORDERED BY: FRITZ TORRES     PROCEDURE DATE:  11/20/2023          INTERPRETATION:  CLINICAL INDICATION: Fall, assess left hip fracture.    TECHNIQUE: CT axial images of the left hip were obtained without   intravenous contrast. Coronal and sagittal reformatted images were also   obtained.    CONTRAST/COMPLICATIONS:  IV Contrast: NONE  Complications: None reported at time of study completion    COMPARISON: XR: 11/18/2023. CT: None. MR: None.    FINDINGS: Evaluation of soft tissue is limited without intravenous   contrast.    Bones: Decreased bone mineralization. Acute impacted left femoral neck   fracture with superomedial displacement. No dislocation    Degenerative changes of the visualized left sacroiliac joint, pubic   symphysis and left hip.    Soft tissue: Stranding at the left lateral hip superficial soft tissue,   likely edema/ecchymosis. Stranding at the deep left hip/proximal thigh   soft tissue. Diffuse muscle bulk loss with fatty replacement. Left hip   hemarthrosis. Chondrocalcinosis at the left hip and pubic symphysis.    Additional: Colon diverticulosis. Atherosclerosis.    IMPRESSION:    Acute impacted left femoral neck fracture.    --- End of Report ---    DAVE TENORIO MD; Attending Radiologist  This document has been electronically signed. Nov 20 2023  1:27AM  ACC: [id number] EXAM:  CT CHEST   ORDERED BY: MERA TIPTON     PROCEDURE DATE:  11/18/2023      INTERPRETATION:  HISTORY: Chest wall pain after fall.    EXAMINATION: CT CHEST was performed without IV contrast.    COMPARISON: None available.    FINDINGS:    AIRWAYS, LUNGS, PLEURA: Clear central airways. No consolidation.   Nonspecific right apical 0.6 cm ground-glass nodule with focal 0.3 cm   solid component (image 69, series 2). No pleural effusion.    MEDIASTINUM: Cardiomegaly. No pericardial effusion. Thoracic aorta normal   caliber.  No large mediastinal lymph nodes.    IMAGED ABDOMEN: Right hepatic lobe hypodensities, likely cysts.    SOFT TISSUES: Unremarkable.    BONES: No acute osseous abnormality.    IMPRESSION:.    No evidence of acute thoracic traumatic injury on this noncontrast CT   chest.    Nonspecific right apical 0.6 upper ground-glass nodule with focal 0.3 cm   solid component. Recommend CT chest follow-up in 3 months to assess   stability.    --- End of Report ---     DEJON RAMÍREZ MD; Attending Radiologist  This document has been electronically signed. Nov 18 2023 11:20AM    ORT Score -   Family Hx of substance abuse	Female	      Male  Alcohol 	                                           1                     3  Illegal drugs	                                   2                     3  Rx drugs                                           4 	                  4  Personal Hx of substance abuse		  Alcohol 	                                          3	                  3  Illegal drugs                                     4	                  4  Rx drugs                                            5 	                  5  Age between 16- 45 years	           1                     1  hx preadolescent sexual abuse	   3 	                  0  Psychological disease		  ADD, OCD, bipolar, schizophrenia   2	          2  Depression                                           1 	          1  Total: 0    a score of 3 or lower indicates low risk for opioid abuse		  a score of 4-7 indicates moderate risk for opioid abuse		  a score of 8 or higher indicates high risk for opioid abuse  	  REVIEW OF SYSTEMS:  CONSTITUTIONAL: No fever or fatigue  RESPIRATORY: No cough, wheezing, chills or hemoptysis; No shortness of breath  CARDIOVASCULAR: No chest pain, palpitations, dizziness, or leg swelling  GASTROINTESTINAL: No loss of appetite, decreased PO intake. No abdominal or epigastric pain. No nausea, vomiting; No diarrhea or constipation.   GENITOURINARY: No dysuria, frequency, hematuria, retention or incontinence  MUSCULOSKELETAL: + Left hip pain and swelling; No muscle, back, pain, no upper muscle strength weakness, no saddle anesthesia, bowel/bladder incontinence, + falls.   NEURO: No headaches, No numbness/tingling b/l LE, No weakness    PHYSICAL EXAM:  GENERAL:  Alert & Oriented X4, cooperative, NAD, Good concentration. Speech is clear.   RESPIRATORY: Respirations even and unlabored. Clear to auscultation bilaterally; No rales, rhonchi, wheezing, or rubs  CARDIOVASCULAR: Normal S1/S2, regular rate and rhythm; No murmurs, rubs, or gallops. No JVD.   GASTROINTESTINAL:  Soft, Nontender, Nondistended; Bowel sounds present. NPO order in place.  PERIPHERAL VASCULAR:  Extremities warm with left hip edema. 2+ Peripheral Pulses, No cyanosis, No calf tenderness  MUSCULOSKELETAL: Motor Strength 5/5 B/L upper and 2/5 lower extremities; ROM decreased to LLE due to pain; + tenderness on palpation of left hip.  SKIN: Warm, dry, intact. No rashes, lesions, scars or wounds. + left hip surgical dressing c/d/i     Risk factors associated with adverse outcomes related to opioid treatment  [ ]  Concurrent benzodiazepine use  [ ]  History/ Active substance use or alcohol use disorder  [ ] Psychiatric co-morbidity  [ ] Sleep apnea  [ ] COPD  [ ] BMI> 35  [ ] Liver dysfunction  [ ] Renal dysfunction  [ ] CHF  [ ] Smoker  [x]  Age > 60 years    [x]  [place]  Reviewed and Copied to Chart. See below.    Plan of care and goal oriented pain management treatment options were discussed with patient and /or primary care giver; all questions and concerns were addressed and care was aligned with patient's wishes.    Educated patient on goal oriented pain management treatment options     11-21-23 @ 10:50 Source of information: CAROL ROY, Chart review  Patient language: English  : n/a    HPI:  Patient is a 79 y/o F with who ambulates independently with  PMH of HTN and carotid stenosis and PSH of appendectomy presented after a fall.   Patient reports that after her usual run in the park she was walking at a slower pace to cool down when she tripped and fell on her left side. Patient reports she did not have any lightheadedness, or dizziness before the fall. Patient also denies hitting her head or losing consciousness. Patient reports that after the fall she had sharp pain in her left hip and left chest. Patient reports after the fall some passerby tried to help him get up but she was not able to stand back up.   Patient denies any recent fevers, chills, weakness, fatigue, malaise, headache, dizziness, lightheadedness, chest pain, palpitations, shortness of breath, cough, nausea, vomiting, abdominal pain, diarrhea, constipation, melena, hematochezia, dysuria, urinary frequency, or urgency. Patient denies any other complains at this time.   Patient reports taking multiple vitamins/supplements like vitamin D, calcium, magnesium, chondroitin sulphate, and glucosamine. Patient denies history of osteoporosis.     [attending addendum: discussed with pt's PMD / cardiologist, Dr Burton, patient with history of a carotid plaque Aortic insufficiency ( moderate) and possible CVA/TIA ( in [place])) but has not had any recent visit or cardiovascular workup.. >> Echo and carotic ordered)]      (18 Nov 2023 14:16)      Pt is admitted for s/p Fall. GOO. Found to have left Femoral neck fracture. S/p percutaneous pinning of left hip on 11/20 POD #1.  Pain consulted for left hip pain.  Pt seen and examined at bedside. Reports pain score 5/10 on left hip when at rest and not moving, tolerable. But when moving left leg pain is 7-8/10 not tolerable. SCALE USED: (1-10 VNRS). Pt describes pain as aching and radiating to LLE, alleviated by pain medication and exacerbated by movement. Pt tolerating PO diet. Denies lethargy, nausea, vomiting, constipation, itchiness. Reports last BM 11/18. Patient stated goal for pain control: to be able to take deep breaths, get out of bed to chair and ambulate with tolerable pain control. Pt has not yet been out of bed postop. Pt reports taking Advil for pain at home.       PAST MEDICAL & SURGICAL HISTORY:      FAMILY HISTORY:    Social History:  Patient denies smoking, alcohol use, or illicit drug use. (18 Nov 2023 14:16)   [x] Denies ETOH use, illicit drug use and smoking    Allergies    No Known Allergies    MEDICATIONS  (STANDING):  acetaminophen     Tablet .. 1000 milliGRAM(s) Oral every 8 hours  enoxaparin Injectable 30 milliGRAM(s) SubCutaneous every 12 hours  polyethylene glycol 3350 17 Gram(s) Oral daily  povidone iodine 10% Solution 1 Application(s) Topical once  senna 2 Tablet(s) Oral at bedtime    MEDICATIONS  (PRN):  magnesium hydroxide Suspension 30 milliLiter(s) Oral daily PRN Constipation  melatonin 3 milliGRAM(s) Oral at bedtime PRN Insomnia  ondansetron Injectable 4 milliGRAM(s) IV Push every 8 hours PRN Nausea and/or Vomiting  oxyCODONE    IR 5 milliGRAM(s) Oral every 4 hours PRN Severe Pain (7 - 10)  oxyCODONE    IR 2.5 milliGRAM(s) Oral every 4 hours PRN Moderate Pain (4 - 6)      Vital Signs Last 24 Hrs  T(C): 36.8 (21 Nov 2023 05:20), Max: 36.8 (21 Nov 2023 05:20)  T(F): 98.2 (21 Nov 2023 05:20), Max: 98.2 (21 Nov 2023 05:20)  HR: 56 (21 Nov 2023 05:20) (53 - 72)  BP: 142/66 (21 Nov 2023 05:20) (122/56 - 153/75)  BP(mean): 91 (21 Nov 2023 05:20) (71 - 91)  RR: 18 (21 Nov 2023 05:20) (10 - 22)  SpO2: 97% (21 Nov 2023 05:20) (95% - 100%)    Parameters below as of 21 Nov 2023 05:20  Patient On (Oxygen Delivery Method): room air        LABS: Reviewed                          13.4   9.31  )-----------( 170      ( 20 Nov 2023 20:43 )             39.6     11-20    137  |  104  |  19<H>  ----------------------------<  110<H>  4.0   |  28  |  0.63    Ca    8.7      20 Nov 2023 20:43  Phos  2.7     11-20  Mg     1.9     11-20      PT/INR - ( 20 Nov 2023 04:58 )   PT: 11.2 sec;   INR: 0.98 ratio         PTT - ( 20 Nov 2023 04:58 )  PTT:28.6 sec    Urinalysis Basic - ( 20 Nov 2023 20:43 )    Color: x / Appearance: x / SG: x / pH: x  Gluc: 110 mg/dL / Ketone: x  / Bili: x / Urobili: x   Blood: x / Protein: x / Nitrite: x   Leuk Esterase: x / RBC: x / WBC x   Sq Epi: x / Non Sq Epi: x / Bacteria: x      CAPILLARY BLOOD GLUCOSE      POCT Blood Glucose.: 96 mg/dL (21 Nov 2023 08:00)    Radiology: Reviewed.     ACC: [id number] EXAM:  CT HIP ONLY LT   ORDERED BY: FRITZ TORRES     PROCEDURE DATE:  11/20/2023          INTERPRETATION:  CLINICAL INDICATION: Fall, assess left hip fracture.    TECHNIQUE: CT axial images of the left hip were obtained without   intravenous contrast. Coronal and sagittal reformatted images were also   obtained.    CONTRAST/COMPLICATIONS:  IV Contrast: NONE  Complications: None reported at time of study completion    COMPARISON: XR: 11/18/2023. CT: None. MR: None.    FINDINGS: Evaluation of soft tissue is limited without intravenous   contrast.    Bones: Decreased bone mineralization. Acute impacted left femoral neck   fracture with superomedial displacement. No dislocation    Degenerative changes of the visualized left sacroiliac joint, pubic   symphysis and left hip.    Soft tissue: Stranding at the left lateral hip superficial soft tissue,   likely edema/ecchymosis. Stranding at the deep left hip/proximal thigh   soft tissue. Diffuse muscle bulk loss with fatty replacement. Left hip   hemarthrosis. Chondrocalcinosis at the left hip and pubic symphysis.    Additional: Colon diverticulosis. Atherosclerosis.    IMPRESSION:    Acute impacted left femoral neck fracture.    --- End of Report ---    DAVE TENORIO MD; Attending Radiologist  This document has been electronically signed. Nov 20 2023  1:27AM  ACC: [id number] EXAM:  CT CHEST   ORDERED BY: MERA TIPTON     PROCEDURE DATE:  11/18/2023      INTERPRETATION:  HISTORY: Chest wall pain after fall.    EXAMINATION: CT CHEST was performed without IV contrast.    COMPARISON: None available.    FINDINGS:    AIRWAYS, LUNGS, PLEURA: Clear central airways. No consolidation.   Nonspecific right apical 0.6 cm ground-glass nodule with focal 0.3 cm   solid component (image 69, series 2). No pleural effusion.    MEDIASTINUM: Cardiomegaly. No pericardial effusion. Thoracic aorta normal   caliber.  No large mediastinal lymph nodes.    IMAGED ABDOMEN: Right hepatic lobe hypodensities, likely cysts.    SOFT TISSUES: Unremarkable.    BONES: No acute osseous abnormality.    IMPRESSION:.    No evidence of acute thoracic traumatic injury on this noncontrast CT   chest.    Nonspecific right apical 0.6 upper ground-glass nodule with focal 0.3 cm   solid component. Recommend CT chest follow-up in 3 months to assess   stability.    --- End of Report ---     DEJON RAMÍREZ MD; Attending Radiologist  This document has been electronically signed. Nov 18 2023 11:20AM    ORT Score -   Family Hx of substance abuse	Female	      Male  Alcohol 	                                           1                     3  Illegal drugs	                                   2                     3  Rx drugs                                           4 	                  4  Personal Hx of substance abuse		  Alcohol 	                                          3	                  3  Illegal drugs                                     4	                  4  Rx drugs                                            5 	                  5  Age between 16- 45 years	           1                     1  hx preadolescent sexual abuse	   3 	                  0  Psychological disease		  ADD, OCD, bipolar, schizophrenia   2	          2  Depression                                           1 	          1  Total: 0    a score of 3 or lower indicates low risk for opioid abuse		  a score of 4-7 indicates moderate risk for opioid abuse		  a score of 8 or higher indicates high risk for opioid abuse  	  REVIEW OF SYSTEMS:  CONSTITUTIONAL: No fever or fatigue  RESPIRATORY: No cough, wheezing, chills or hemoptysis; No shortness of breath  CARDIOVASCULAR: No chest pain, palpitations, dizziness, or leg swelling  GASTROINTESTINAL: No loss of appetite, decreased PO intake. No abdominal or epigastric pain. No nausea, vomiting; No diarrhea or constipation.   GENITOURINARY: No dysuria, frequency, hematuria, retention or incontinence  MUSCULOSKELETAL: + Left hip pain and swelling; No muscle, back, pain, no upper muscle strength weakness, no saddle anesthesia, bowel/bladder incontinence, + falls.   NEURO: No headaches, No numbness/tingling b/l LE, No weakness    PHYSICAL EXAM:  GENERAL:  Alert & Oriented X4, cooperative, NAD, Good concentration. Speech is clear.   RESPIRATORY: Respirations even and unlabored. Clear to auscultation bilaterally; No rales, rhonchi, wheezing, or rubs  CARDIOVASCULAR: Normal S1/S2, regular rate and rhythm; No murmurs, rubs, or gallops. No JVD.   GASTROINTESTINAL:  Soft, Nontender, Nondistended; Bowel sounds present. NPO order in place.  PERIPHERAL VASCULAR:  Extremities warm with left hip edema. 2+ Peripheral Pulses, No cyanosis, No calf tenderness  MUSCULOSKELETAL: Motor Strength 5/5 B/L upper and 2/5 lower extremities; ROM decreased to LLE due to pain; + tenderness on palpation of left hip.  SKIN: Warm, dry, intact. No rashes, lesions, scars or wounds. + left hip surgical dressing c/d/i     Risk factors associated with adverse outcomes related to opioid treatment  [ ]  Concurrent benzodiazepine use  [ ]  History/ Active substance use or alcohol use disorder  [ ] Psychiatric co-morbidity  [ ] Sleep apnea  [ ] COPD  [ ] BMI> 35  [ ] Liver dysfunction  [ ] Renal dysfunction  [ ] CHF  [ ] Smoker  [x]  Age > 60 years    [x]  [place]  Reviewed and Copied to Chart. See below.    Plan of care and goal oriented pain management treatment options were discussed with patient and /or primary care giver; all questions and concerns were addressed and care was aligned with patient's wishes.    Educated patient on goal oriented pain management treatment options     11-21-23 @ 10:50

## 2023-11-21 NOTE — PHYSICAL THERAPY INITIAL EVALUATION ADULT - STANDING BALANCE: DYNAMIC, REHAB EVAL
[address] [facility] Name: Swathi Chase  MRN: [id number]  [place] 1960  Date of evaluation: 3/4/2022  Provider: Jeanine Cummings MD    CHIEF COMPLAINT       Chief Complaint   Patient presents with    Hernia     Present to the ED with concern for a hernia after lifting at work         HISTORY OF PRESENT ILLNESS   (Location/Symptom, Timing/Onset, Context/Setting, Quality, Duration, Modifying Factors, Severity)  Note limiting factors. Swathi Chase is a 63 y.o. male who presents to the emergency department     Patient presents with a several week history of what appears to be a diastasis of his rectus muscle he says occasionally does have a hernia develop but it is not really painful he has no pain at all at this point he has no nausea no vomiting no inguinal hernias noted. No complaints of any other problems he did have a appendectomy about 2 years ago by Dr. Marcia Hernández  The patient says he does a tremendous amount of heavy lifting. Patient denies any other abnormalities    The history is provided by the patient. Nursing Notes were reviewed. REVIEW OF SYSTEMS    (2-9 systems for level 4, 10 or more for level 5)     Review of Systems   Constitutional: Positive for activity change. Negative for chills and fever. HENT: Negative for congestion. Respiratory: Negative for shortness of breath. Cardiovascular: Negative for chest pain. Gastrointestinal: Negative for abdominal distention, abdominal pain, constipation, diarrhea, nausea, rectal pain and vomiting. Patient complains of an occasional protuberance of his rectus muscle where he has I think a diastasis   Musculoskeletal: Negative for back pain and neck pain. Allergic/Immunologic: Negative for immunocompromised state. Neurological: Negative for dizziness. Psychiatric/Behavioral: Negative for behavioral problems. All other systems reviewed and are negative.       Except as noted above the remainder of the review of systems was reviewed and negative. PAST MEDICAL HISTORY     Past Medical History:   Diagnosis Date    No history of procedure     No previous hx of colonoscopy    Reflux          SURGICAL HISTORY       Past Surgical History:   Procedure Laterality Date    APPENDECTOMY  09/13/2019    laparoscopic    COLONOSCOPY  01/25/2016    Sigmoid polyps    FRACTURE SURGERY Left     open leg fracture as a child    FRACTURE SURGERY Right     jaw fracture    LAPAROSCOPIC APPENDECTOMY N/A 9/13/2019    LAPAROSCOPIC APPENDECTOMY performed by Zeferino Bates MD at [address] [place] [address] Drive  02/24/2017    TONSILLECTOMY    WISDOM TOOTH EXTRACTION      all 4         CURRENT MEDICATIONS       Previous Medications    No medications on file       ALLERGIES     Patient has no known allergies. FAMILY HISTORY       Family History   Problem Relation Age of Onset    Cancer Father         Liver or Colon CA??    Cancer Paternal Grandmother         Bone Marrow    Arthritis Mother           SOCIAL HISTORY       Social History     Socioeconomic History    Marital status:       Spouse name: None    Number of children: None    Years of education: None    Highest education level: None   Occupational History    None   Tobacco Use    Smoking status: Current Every Day Smoker     Packs/day: 1.00     Years: 35.00     Pack years: 35.00     Types: Cigarettes    Smokeless tobacco: Never Used   Vaping Use    Vaping Use: Never used   Substance and Sexual Activity    Alcohol use: Yes     Comment: weekend     Drug use: No    Sexual activity: Yes     Partners: Female   Other Topics Concern    None   Social History Narrative    None     Social Determinants of Health     Financial Resource Strain:     Difficulty of Paying Living Expenses: Not on file   Food Insecurity:     Worried About Running Out of Food in the Last Year: Not on file    Alexa of Food in the Last Year: Not on file   Transportation Needs:     Lack of Transportation (Medical): Not on file    Lack of Transportation (Non-Medical): Not on file   Physical Activity:     Days of Exercise per Week: Not on file    Minutes of Exercise per Session: Not on file   Stress:     Feeling of Stress : Not on file   Social Connections:     Frequency of Communication with Friends and Family: Not on file    Frequency of Social Gatherings with Friends and Family: Not on file    Attends Sabianism Services: Not on file    Active Member of [address] [place] [address] [employer] or Organizations: Not on file    Attends Club or Organization Meetings: Not on file    Marital Status: Not on file   Intimate Partner Violence:     Fear of Current or Ex-Partner: Not on file    Emotionally Abused: Not on file    Physically Abused: Not on file    Sexually Abused: Not on file   Housing Stability:     Unable to Pay for Housing in the Last Year: Not on file    Number of [place] in the Last Year: Not on file    Unstable Housing in the Last Year: Not on file       SCREENINGS    [place] Coma Scale  Eye Opening: Spontaneous  Best Verbal Response: Oriented  Best Motor Response: Obeys commands  Blu Coma Scale Score: 15          PHYSICAL EXAM    (up to 7 for level 4, 8 or more for level 5)     ED Triage Vitals [03/04/22 1156]   BP Temp Temp Source Pulse Resp SpO2 Height Weight   (!) 174/100 98.1 °F (36.7 °C) Oral 80 18 98 % 6' 2\" (1.88 m) 205 lb (93 kg)       Physical Exam  Vitals and nursing note reviewed. Constitutional:       General: He is not in acute distress. Appearance: He is well-developed. He is not diaphoretic. HENT:      Head: Normocephalic. Nose: Nose normal.   Eyes:      Conjunctiva/sclera: Conjunctivae normal.      Pupils: Pupils are equal, round, and reactive to light. Neck:      Thyroid: No thyromegaly. Cardiovascular:      Rate and Rhythm: Normal rate and regular rhythm. Pulses: Normal pulses. Heart sounds: Normal heart sounds. No murmur heard. No friction rub. No gallop. Pulmonary:      Effort: Pulmonary effort is normal. No respiratory distress. Breath sounds: Normal breath sounds. No wheezing or rhonchi. Abdominal:      General: Abdomen is flat. Bowel sounds are normal. There is no distension. Palpations: Abdomen is soft. There is no mass. Tenderness: There is no abdominal tenderness. Hernia: No hernia is present. Comments: Patient does have some diastasis of his rectUS  muscle group  There is no tender hernia there is no hernia at all  He has bowel sounds which are active very soft   Musculoskeletal:      Cervical back: Normal range of motion and neck supple. Neurological:      Mental Status: He is alert and oriented to person, place, and time. GCS: GCS eye subscore is 4. GCS verbal subscore is 5. GCS motor subscore is 6. Cranial Nerves: No cranial nerve deficit. Sensory: No sensory deficit. Motor: No abnormal muscle tone. Coordination: Coordination normal.      Deep Tendon Reflexes: Reflexes normal.   Psychiatric:         Behavior: Behavior normal.         DIAGNOSTIC RESULTS     EKG: All EKG's are interpreted by the Emergency Department Physician who either signs or Co-signs this chart in the absence of a cardiologist.        RADIOLOGY:   Non-plain film images such as CT, Ultrasound and MRI are read by the radiologist. Plain radiographic images are visualized and preliminarily interpreted by the emergency physician with the below findings:        Interpretation per the Radiologist below, if available at the time of this note:    No orders to display           LABS:  No results found for this visit on 03/04/22.          EMERGENCY DEPARTMENT COURSE and DIFFERENTIAL DIAGNOSIS/MDM:     Vitals:    03/04/22 1156   BP: (!) 174/100   Pulse: 80   Resp: 18   Temp: 98.1 °F (36.7 °C)   TempSrc: Oral   SpO2: 98%   Weight: 205 lb (93 kg)   Height: 6' 2\" (1.88 m) MDM      REASSESSMENT      Has no tenderness no signs of an acute incarcerated or strangulated hernia he is therefore reassured. And is advised on follow-up with his general surgeon    CRITICAL CARE TIME     CONSULTS:  None      PROCEDURES:     Procedures    MEDICATIONS GIVEN THIS VISIT:  Medications - No data to display     FINAL IMPRESSION      1. Strain of rectus abdominis muscle, initial encounter            DISPOSITION/PLAN   DISPOSITION Decision To Discharge 03/04/2022 12:01:56 PM      PATIENT REFERRED TO:  [facility] Emergency Department  [address] [place] [address]  [phone]    If symptoms worsen    Sahil Baires MD  [address] [facility] [address]  [address] [place] [address]  [phone]    Schedule an appointment as soon as possible for a visit in 1 week        DISCHARGE MEDICATIONS:  New Prescriptions    No medications on file       Controlled Substances Monitoring  No flowsheet data found. (Please note that portions of this note were completed with a voice recognition program.  Efforts were made to edit the dictations but occasionally words are mis-transcribed.)    Patient was advised to return to the Emergency Department if there was any worsening.     Shadia Gonzales MD (electronically signed)  Attending Emergency Physician         Brady Chahal MD  03/04/22 8637 fair balance

## 2023-11-21 NOTE — PROGRESS NOTE ADULT - ASSESSMENT
Confidential Drug Utilization Report  Search Terms: Rocio Crow, 1945Search Date: 11/20/2023 13:08:45 PM  The Drug Utilization Report below displays all of the controlled substance prescriptions, if any, that your patient has filled in the last twelve months. The information displayed on this report is compiled from pharmacy submissions to the Department, and accurately reflects the information as submitted by the pharmacies.    This report was requested by: Deanna Canada | Reference #: [id number]    There are no results for the search terms that you entered.      Confidential Drug Utilization Report  Search Terms: Rocio Crow, 1945Search Date: 11/20/2023 13:08:45 PM  The Drug Utilization Report below displays all of the controlled substance prescriptions, if any, that your patient has filled in the last twelve months. The information displayed on this report is compiled from pharmacy submissions to the Department, and accurately reflects the information as submitted by the pharmacies.    This report was requested by: Deanna Canada | Reference #: [id number]    There are no results for the search terms that you entered.      Confidential Drug Utilization Report  Search Terms: Rocio Crow, 1945Search Date: 11/20/2023 13:08:45 PM  The Drug Utilization Report below displays all of the controlled substance prescriptions, if any, that your patient has filled in the last twelve months. The information displayed on this report is compiled from pharmacy submissions to the Department, and accurately reflects the information as submitted by the pharmacies.    This report was requested by: Deanna Canada | Reference #: [id number]    There are no results for the search terms that you entered.

## 2023-11-21 NOTE — PROGRESS NOTE ADULT - SUBJECTIVE AND OBJECTIVE BOX
78yFemale    Diagnosis:  S/p  Left hip pinning fx POD# 1    24hr interval/acute events:  Pt offers no acute complaints.   Pain is moderate); localized to the LLE and well controlled with medication. Pain is increased with movement and alleviated when laying in bed.   Denies cp, sob, palpitations, paresthesias, nvd.    Vital Signs Last 24 Hrs  T(C): 36.8 (21 Nov 2023 05:20), Max: 36.8 (21 Nov 2023 05:20)  T(F): 98.2 (21 Nov 2023 05:20), Max: 98.2 (21 Nov 2023 05:20)  HR: 56 (21 Nov 2023 05:20) (53 - 72)  BP: 142/66 (21 Nov 2023 05:20) (122/56 - 153/75)  BP(mean): 91 (21 Nov 2023 05:20) (71 - 91)  RR: 18 (21 Nov 2023 05:20) (10 - 22)  SpO2: 97% (21 Nov 2023 05:20) (95% - 100%)    Parameters below as of 21 Nov 2023 05:20  Patient On (Oxygen Delivery Method): room air      I&O's Detail      Physical Exam:    General: AAOx3, in NAD, resting comfortably in bed.  Left hip:    Skin pink, warm LLE.  In nl. alignment.   Dressing is C/D/I.    No ct, calves soft  2+pulses  NVI silt  5/5 strength ehl/ta/gs b/l.                    11-20    137  |  104  |  19<H>  ----------------------------<  110<H>  4.0   |  28  |  0.63    Ca    8.7      20 Nov 2023 20:43  Phos  2.7     11-20  Mg     1.9     11-20 11-20    137  |  104  |  19<H>  ----------------------------<  110<H>  4.0   |  28  |  0.63    Ca    8.7      20 Nov 2023 20:43  Phos  2.7     11-20  Mg     1.9     11-20        Impression:  78yFemale S/p Left hip pinning fx POD# 1  Plan:  -  Continue pain management and home meds  -  DVT prophylaxis with Lovenox  -  Daily Physical Therapy:  WBAT on LLE with appropriate assistive device  -  Discharge planning: Home Vs. Rehab pending.  -  Continue Antibiotics x 24hrs post-op  -  Case d/w Dr. Anaya An

## 2023-11-22 ENCOUNTER — TRANSCRIPTION ENCOUNTER (OUTPATIENT)
Age: 78
End: 2023-11-22

## 2023-11-22 VITALS
RESPIRATION RATE: 16 BRPM | DIASTOLIC BLOOD PRESSURE: 77 MMHG | OXYGEN SATURATION: 96 % | TEMPERATURE: 100 F | SYSTOLIC BLOOD PRESSURE: 128 MMHG | HEART RATE: 66 BPM

## 2023-11-22 DIAGNOSIS — S72.002A FRACTURE OF UNSPECIFIED PART OF NECK OF LEFT FEMUR, INITIAL ENCOUNTER FOR CLOSED FRACTURE: ICD-10-CM

## 2023-11-22 LAB
ANION GAP SERPL CALC-SCNC: 3 MMOL/L — LOW (ref 5–17)
BUN SERPL-MCNC: 13 MG/DL — SIGNIFICANT CHANGE UP (ref 7–18)
CALCIUM SERPL-MCNC: 8.3 MG/DL — LOW (ref 8.4–10.5)
CHLORIDE SERPL-SCNC: 103 MMOL/L — SIGNIFICANT CHANGE UP (ref 96–108)
CO2 SERPL-SCNC: 30 MMOL/L — SIGNIFICANT CHANGE UP (ref 22–31)
CREAT SERPL-MCNC: 0.62 MG/DL — SIGNIFICANT CHANGE UP (ref 0.5–1.3)
EGFR: 92 ML/MIN/1.73M2 — SIGNIFICANT CHANGE UP
GLUCOSE SERPL-MCNC: 118 MG/DL — HIGH (ref 70–99)
HCT VFR BLD CALC: 38.4 % — SIGNIFICANT CHANGE UP (ref 34.5–45)
HGB BLD-MCNC: 12.9 G/DL — SIGNIFICANT CHANGE UP (ref 11.5–15.5)
MCHC RBC-ENTMCNC: 33 PG — SIGNIFICANT CHANGE UP (ref 27–34)
MCHC RBC-ENTMCNC: 33.6 GM/DL — SIGNIFICANT CHANGE UP (ref 32–36)
MCV RBC AUTO: 98.2 FL — SIGNIFICANT CHANGE UP (ref 80–100)
NRBC # BLD: 0 /100 WBCS — SIGNIFICANT CHANGE UP (ref 0–0)
PLATELET # BLD AUTO: 195 K/UL — SIGNIFICANT CHANGE UP (ref 150–400)
POTASSIUM SERPL-MCNC: 4 MMOL/L — SIGNIFICANT CHANGE UP (ref 3.5–5.3)
POTASSIUM SERPL-SCNC: 4 MMOL/L — SIGNIFICANT CHANGE UP (ref 3.5–5.3)
RBC # BLD: 3.91 M/UL — SIGNIFICANT CHANGE UP (ref 3.8–5.2)
RBC # FLD: 12.8 % — SIGNIFICANT CHANGE UP (ref 10.3–14.5)
SODIUM SERPL-SCNC: 136 MMOL/L — SIGNIFICANT CHANGE UP (ref 135–145)
WBC # BLD: 7.01 K/UL — SIGNIFICANT CHANGE UP (ref 3.8–10.5)
WBC # FLD AUTO: 7.01 K/UL — SIGNIFICANT CHANGE UP (ref 3.8–10.5)

## 2023-11-22 PROCEDURE — 85027 COMPLETE CBC AUTOMATED: CPT

## 2023-11-22 PROCEDURE — 86850 RBC ANTIBODY SCREEN: CPT

## 2023-11-22 PROCEDURE — 99285 EMERGENCY DEPT VISIT HI MDM: CPT | Mod: 25

## 2023-11-22 PROCEDURE — 96374 THER/PROPH/DIAG INJ IV PUSH: CPT

## 2023-11-22 PROCEDURE — 96372 THER/PROPH/DIAG INJ SC/IM: CPT | Mod: XU

## 2023-11-22 PROCEDURE — 73700 CT LOWER EXTREMITY W/O DYE: CPT

## 2023-11-22 PROCEDURE — 97116 GAIT TRAINING THERAPY: CPT

## 2023-11-22 PROCEDURE — 82306 VITAMIN D 25 HYDROXY: CPT

## 2023-11-22 PROCEDURE — 97530 THERAPEUTIC ACTIVITIES: CPT

## 2023-11-22 PROCEDURE — 93306 TTE W/DOPPLER COMPLETE: CPT

## 2023-11-22 PROCEDURE — 36415 COLL VENOUS BLD VENIPUNCTURE: CPT

## 2023-11-22 PROCEDURE — 86923 COMPATIBILITY TEST ELECTRIC: CPT

## 2023-11-22 PROCEDURE — 84100 ASSAY OF PHOSPHORUS: CPT

## 2023-11-22 PROCEDURE — 86803 HEPATITIS C AB TEST: CPT

## 2023-11-22 PROCEDURE — 80048 BASIC METABOLIC PNL TOTAL CA: CPT

## 2023-11-22 PROCEDURE — C1713: CPT

## 2023-11-22 PROCEDURE — 73552 X-RAY EXAM OF FEMUR 2/>: CPT

## 2023-11-22 PROCEDURE — 97110 THERAPEUTIC EXERCISES: CPT

## 2023-11-22 PROCEDURE — 80053 COMPREHEN METABOLIC PANEL: CPT

## 2023-11-22 PROCEDURE — 93005 ELECTROCARDIOGRAM TRACING: CPT

## 2023-11-22 PROCEDURE — 86900 BLOOD TYPING SEROLOGIC ABO: CPT

## 2023-11-22 PROCEDURE — 73502 X-RAY EXAM HIP UNI 2-3 VIEWS: CPT

## 2023-11-22 PROCEDURE — 93880 EXTRACRANIAL BILAT STUDY: CPT

## 2023-11-22 PROCEDURE — 82962 GLUCOSE BLOOD TEST: CPT

## 2023-11-22 PROCEDURE — 97162 PT EVAL MOD COMPLEX 30 MIN: CPT

## 2023-11-22 PROCEDURE — 71250 CT THORAX DX C-: CPT | Mod: MA

## 2023-11-22 PROCEDURE — 83735 ASSAY OF MAGNESIUM: CPT

## 2023-11-22 PROCEDURE — 76000 FLUOROSCOPY <1 HR PHYS/QHP: CPT

## 2023-11-22 PROCEDURE — 85730 THROMBOPLASTIN TIME PARTIAL: CPT

## 2023-11-22 PROCEDURE — 85025 COMPLETE CBC W/AUTO DIFF WBC: CPT

## 2023-11-22 PROCEDURE — 85610 PROTHROMBIN TIME: CPT

## 2023-11-22 PROCEDURE — 86901 BLOOD TYPING SEROLOGIC RH(D): CPT

## 2023-11-22 PROCEDURE — 99232 SBSQ HOSP IP/OBS MODERATE 35: CPT

## 2023-11-22 RX ORDER — POLYETHYLENE GLYCOL 3350 17 G/17G
17 POWDER, FOR SOLUTION ORAL
Qty: 1 | Refills: 0
Start: 2023-11-22 | End: 2023-12-03

## 2023-11-22 RX ORDER — CELECOXIB 200 MG/1
1 CAPSULE ORAL
Qty: 7 | Refills: 0
Start: 2023-11-22 | End: 2023-11-28

## 2023-11-22 RX ORDER — OXYCODONE AND ACETAMINOPHEN 5; 325 MG/1; MG/1
1 TABLET ORAL
Qty: 28 | Refills: 0
Start: 2023-11-22 | End: 2023-11-28

## 2023-11-22 RX ORDER — SENNA PLUS 8.6 MG/1
2 TABLET ORAL
Qty: 24 | Refills: 0
Start: 2023-11-22 | End: 2023-12-03

## 2023-11-22 RX ADMIN — ENOXAPARIN SODIUM 30 MILLIGRAM(S): 100 INJECTION SUBCUTANEOUS at 06:01

## 2023-11-22 RX ADMIN — Medication 1000 MILLIGRAM(S): at 14:38

## 2023-11-22 RX ADMIN — Medication 1000 MILLIGRAM(S): at 13:38

## 2023-11-22 NOTE — PROGRESS NOTE ADULT - PROBLEM SELECTOR PROBLEM 1
Acute hip pain, left
Fracture of femoral neck, left
Acute hip pain, left
Fracture of femoral neck, left

## 2023-11-22 NOTE — PROGRESS NOTE ADULT - PROVIDER SPECIALTY LIST ADULT
Internal Medicine
Internal Medicine
Orthopedics
Orthopedics
Internal Medicine
Orthopedics
Orthopedics
Internal Medicine
Pain Medicine
Pain Medicine

## 2023-11-22 NOTE — PROGRESS NOTE ADULT - PROBLEM SELECTOR PROBLEM 2
Fracture of femoral neck, left
Hypertension
Acute postoperative pain of hip
Fracture of femoral neck, left

## 2023-11-22 NOTE — CHART NOTE - NSCHARTNOTEFT_GEN_A_CORE
Focus note/ initial assessment:   Patient is a 78y old  Female who presents with a chief complaint of hip fx, S/p Left Hip Pinning POD#2 (22 Nov 2023 08:40). RN Consult requested re: MST 2  or greater (2+Unsure weight loss). Pt visited,  present. She denies wt loss, reports eats small amounts ("was a runner"), reports people tell her she is too thin. Reports pleased w/ dietary services, no issues. Declined diet education. Pt for Dd/c home.    Diet Prescription: Diet, Regular:   Lacto-Ovo Veg (Accepts Milk Prod., Eggs) (11-18-23 @ 17:28)      Pertinent Medications: MEDICATIONS  (STANDING):  acetaminophen     Tablet .. 1000 milliGRAM(s) Oral every 8 hours  enoxaparin Injectable 30 milliGRAM(s) SubCutaneous every 12 hours  polyethylene glycol 3350 17 Gram(s) Oral daily  povidone iodine 10% Solution 1 Application(s) Topical once  senna 2 Tablet(s) Oral at bedtime    MEDICATIONS  (PRN):  magnesium hydroxide Suspension 30 milliLiter(s) Oral daily PRN Constipation  melatonin 3 milliGRAM(s) Oral at bedtime PRN Insomnia  ondansetron Injectable 4 milliGRAM(s) IV Push every 8 hours PRN Nausea and/or Vomiting  oxyCODONE    IR 2.5 milliGRAM(s) Oral every 4 hours PRN Moderate Pain (4 - 6)  oxyCODONE    IR 5 milliGRAM(s) Oral every 4 hours PRN Severe Pain (7 - 10)    Pertinent Labs: 11-22 Na136 mmol/L Glu 118 mg/dL<H> K+ 4.0 mmol/L Cr  0.62 mg/dL BUN 13 mg/dL 11-20 Phos 2.7 mg/dL 11-18 Alb 3.9 g/dL        No Nutrition Diagnosis     Interventions:   Recommend  [ ] Change Diet To:  [ ] Nutrition Supplement  [ ] Nutrition Support  [x ] Other: Diet as ordered. Pt for D/cC home. MD to monitor. RD available.   [x ] other: business card given (w/ kitchen phone number)

## 2023-11-22 NOTE — PROGRESS NOTE ADULT - PROBLEM SELECTOR PLAN 1
Pt with acute left hip pain which is somatic and neuropathic in nature due to left hip fracture. S/p percutaneous pinning of left hip on 11/20 POD #2.   High risk medications reviewed. Avoid polypharmacy. Avoid IV opioids. Avoid NSAIDs and benzodiazepines. Non-pharmacological sleep aides initiated. Non-opioid medications and non-pharmacological pain management measures initiated.  Opioid pain recommendations   - Continue Oxycodone 2.5/5 mg PO q 4 hours PRN moderate/severe pain. Monitor for sedation/ respiratory depression.   Non-opioid pain recommendations   - Acetaminophen 1G PO q8h x 4 days.  Bowel Regimen  - Continue Miralax 17G PO daily  - Continue Senna 2 tablets at bedtime for constipation  Mild pain   - Non-pharmacological pain treatment recommendations  - Warm/ Cool packs PRN   - Repositioning extremity, guided imagery, relaxation, distraction.  - Physical therapy OOB if no contraindications   Recommendations discussed with primary team and RN.
S/p fall at home with fx to left femoral neck  Day 1 S/P left hip hemiarthroplasty  C/W DVT ppx  C/W pain management   F/u PT Eval and reccs
Pt with acute left hip pain which is somatic and neuropathic in nature due to left hip fracture. S/p percutaneous pinning of left hip on 11/20 POD #1.   High risk medications reviewed. Avoid polypharmacy. Avoid IV opioids. Avoid NSAIDs and benzodiazepines. Non-pharmacological sleep aides initiated. Non-opioid medications and non-pharmacological pain management measures initiated.  Opioid pain recommendations   - Continue Oxycodone 2.5/5 mg PO q 4 hours PRN moderate/severe pain. Monitor for sedation/ respiratory depression.   Non-opioid pain recommendations   - Acetaminophen 1G PO q8h x 4 days.  Bowel Regimen  - Continue Miralax 17G PO daily  - Continue Senna 2 tablets at bedtime for constipation  Mild pain   - Non-pharmacological pain treatment recommendations  - Warm/ Cool packs PRN   - Repositioning extremity, guided imagery, relaxation, distraction.  - Physical therapy OOB if no contraindications   Recommendations discussed with primary team and RN.
Xray pelvis - left femoral neck fracture.  Ortho consulted -  Left hip hemiarthroplasty on Monday, 11/20/2023.   PT consult after surgery  pain management   F/U echo.   Ewing - 0.0% risk of MI or cardiac arrest, intraoperatively or up to 30 days post up.    RCRI - 3.9% 30 day risk of death, MI, or cardiac arrest.

## 2023-11-22 NOTE — PROGRESS NOTE ADULT - SUBJECTIVE AND OBJECTIVE BOX
Source of information: CAROL ROY, Chart review  Patient language: English  : n/a    HPI:  Patient is a 79 y/o F with who ambulates independently with  PMH of HTN and carotid stenosis and PSH of appendectomy presented after a fall.   Patient reports that after her usual run in the park she was walking at a slower pace to cool down when she tripped and fell on her left side. Patient reports she did not have any lightheadedness, or dizziness before the fall. Patient also denies hitting her head or losing consciousness. Patient reports that after the fall she had sharp pain in her left hip and left chest. Patient reports after the fall some passerby tried to help him get up but she was not able to stand back up.   Patient denies any recent fevers, chills, weakness, fatigue, malaise, headache, dizziness, lightheadedness, chest pain, palpitations, shortness of breath, cough, nausea, vomiting, abdominal pain, diarrhea, constipation, melena, hematochezia, dysuria, urinary frequency, or urgency. Patient denies any other complains at this time.   Patient reports taking multiple vitamins/supplements like vitamin D, calcium, magnesium, chondroitin sulphate, and glucosamine. Patient denies history of osteoporosis.     [attending addendum: discussed with pt's PMD / cardiologist, Dr Burton, patient with history of a carotid plaque Aortic insufficiency ( moderate) and possible CVA/TIA ( in [place])) but has not had any recent visit or cardiovascular workup.. >> Echo and carotic ordered)]      (18 Nov 2023 14:16)      Pt is admitted for s/p Fall. [facility]. Found to have left Femoral neck fracture. S/p percutaneous pinning of left hip on 11/20 POD #1.  Pain consulted for left hip pain.  Pt seen and examined at bedside. Reports pain score 5/10 on left hip and tolerable. SCALE USED: (1-10 VNRS). Pt describes pain as aching and radiating to LLE, alleviated by pain medication and exacerbated by movement. Pt tolerating PO diet. Denies lethargy, nausea, vomiting, constipation, itchiness. Reports last BM 11/18. Patient stated goal for pain control: to be able to take deep breaths, get out of bed to chair and ambulate with tolerable pain control. Reports getting out of bed to chair 11/21. Has not yet seen PT this morning at time of evaluation. Pt reports taking Advil for pain at home.     PAST MEDICAL & SURGICAL HISTORY:      FAMILY HISTORY:    Social History:  Patient denies smoking, alcohol use, or illicit drug use. (18 Nov 2023 14:16)   [x] Denies ETOH use, illicit drug use and smoking    Allergies    No Known Allergies    MEDICATIONS  (STANDING):  acetaminophen     Tablet .. 1000 milliGRAM(s) Oral every 8 hours  enoxaparin Injectable 30 milliGRAM(s) SubCutaneous every 12 hours  polyethylene glycol 3350 17 Gram(s) Oral daily  povidone iodine 10% Solution 1 Application(s) Topical once  senna 2 Tablet(s) Oral at bedtime    MEDICATIONS  (PRN):  magnesium hydroxide Suspension 30 milliLiter(s) Oral daily PRN Constipation  melatonin 3 milliGRAM(s) Oral at bedtime PRN Insomnia  ondansetron Injectable 4 milliGRAM(s) IV Push every 8 hours PRN Nausea and/or Vomiting  oxyCODONE    IR 5 milliGRAM(s) Oral every 4 hours PRN Severe Pain (7 - 10)  oxyCODONE    IR 2.5 milliGRAM(s) Oral every 4 hours PRN Moderate Pain (4 - 6)      Vital Signs Last 24 Hrs  T(C): 36.4 (22 Nov 2023 05:54), Max: 36.8 (21 Nov 2023 14:26)  T(F): 97.5 (22 Nov 2023 05:54), Max: 98.3 (21 Nov 2023 19:49)  HR: 58 (22 Nov 2023 05:54) (58 - 83)  BP: 143/74 (22 Nov 2023 05:54) (87/46 - 144/79)  BP(mean): 97 (22 Nov 2023 05:54) (60 - 101)  RR: 16 (22 Nov 2023 05:54) (16 - 20)  SpO2: 97% (22 Nov 2023 05:54) (94% - 97%)    Parameters below as of 22 Nov 2023 05:54  Patient On (Oxygen Delivery Method): room air        LABS: Reviewed                          12.9   7.01  )-----------( 195      ( 22 Nov 2023 04:38 )             38.4     11-22    136  |  103  |  13  ----------------------------<  118<H>  4.0   |  30  |  0.62    Ca    8.3<L>      22 Nov 2023 04:38          Urinalysis Basic - ( 22 Nov 2023 04:38 )    Color: x / Appearance: x / SG: x / pH: x  Gluc: 118 mg/dL / Ketone: x  / Bili: x / Urobili: x   Blood: x / Protein: x / Nitrite: x   Leuk Esterase: x / RBC: x / WBC x   Sq Epi: x / Non Sq Epi: x / Bacteria: x    Radiology: Reviewed.     ACC: [id number] EXAM:  CT HIP ONLY LT   ORDERED BY: FRITZ TORRES     PROCEDURE DATE:  11/20/2023          INTERPRETATION:  CLINICAL INDICATION: Fall, assess left hip fracture.    TECHNIQUE: CT axial images of the left hip were obtained without   intravenous contrast. Coronal and sagittal reformatted images were also   obtained.    CONTRAST/COMPLICATIONS:  IV Contrast: NONE  Complications: None reported at time of study completion    COMPARISON: XR: 11/18/2023. CT: None. MR: None.    FINDINGS: Evaluation of soft tissue is limited without intravenous   contrast.    Bones: Decreased bone mineralization. Acute impacted left femoral neck   fracture with superomedial displacement. No dislocation    Degenerative changes of the visualized left sacroiliac joint, pubic   symphysis and left hip.    Soft tissue: Stranding at the left lateral hip superficial soft tissue,   likely edema/ecchymosis. Stranding at the deep left hip/proximal thigh   soft tissue. Diffuse muscle bulk loss with fatty replacement. Left hip   hemarthrosis. Chondrocalcinosis at the left hip and pubic symphysis.    Additional: Colon diverticulosis. Atherosclerosis.    IMPRESSION:    Acute impacted left femoral neck fracture.    --- End of Report ---    DAVE TENORIO MD; Attending Radiologist  This document has been electronically signed. Nov 20 2023  1:27AM  ACC: [id number] EXAM:  CT CHEST   ORDERED BY: MERA TIPTON     PROCEDURE DATE:  11/18/2023      INTERPRETATION:  HISTORY: Chest wall pain after fall.    EXAMINATION: CT CHEST was performed without IV contrast.    COMPARISON: None available.    FINDINGS:    AIRWAYS, LUNGS, PLEURA: Clear central airways. No consolidation.   Nonspecific right apical 0.6 cm ground-glass nodule with focal 0.3 cm   solid component (image 69, series 2). No pleural effusion.    MEDIASTINUM: Cardiomegaly. No pericardial effusion. Thoracic aorta normal   caliber.  No large mediastinal lymph nodes.    IMAGED ABDOMEN: Right hepatic lobe hypodensities, likely cysts.    SOFT TISSUES: Unremarkable.    BONES: No acute osseous abnormality.    IMPRESSION:.    No evidence of acute thoracic traumatic injury on this noncontrast CT   chest.    Nonspecific right apical 0.6 upper ground-glass nodule with focal 0.3 cm   solid component. Recommend CT chest follow-up in 3 months to assess   stability.    --- End of Report ---     DEJON RAMÍREZ MD; Attending Radiologist  This document has been electronically signed. Nov 18 2023 11:20AM    ORT Score -   Family Hx of substance abuse	Female	      Male  Alcohol 	                                           1                     3  Illegal drugs	                                   2                     3  Rx drugs                                           4 	                  4  Personal Hx of substance abuse		  Alcohol 	                                          3	                  3  Illegal drugs                                     4	                  4  Rx drugs                                            5 	                  5  Age between 16- 45 years	           1                     1  hx preadolescent sexual abuse	   3 	                  0  Psychological disease		  ADD, OCD, bipolar, schizophrenia   2	          2  Depression                                           1 	          1  Total: 0    a score of 3 or lower indicates low risk for opioid abuse		  a score of 4-7 indicates moderate risk for opioid abuse		  a score of 8 or higher indicates high risk for opioid abuse  	  REVIEW OF SYSTEMS:  CONSTITUTIONAL: No fever or fatigue  RESPIRATORY: No cough, wheezing, chills or hemoptysis; No shortness of breath  CARDIOVASCULAR: No chest pain, palpitations, dizziness, or leg swelling  GASTROINTESTINAL: No loss of appetite, decreased PO intake. No abdominal or epigastric pain. No nausea, vomiting; No diarrhea or constipation.   GENITOURINARY: No dysuria, frequency, hematuria, retention or incontinence  MUSCULOSKELETAL: + Left hip pain and swelling; No muscle, back, pain, no upper muscle strength weakness, no saddle anesthesia, bowel/bladder incontinence, + falls.   NEURO: No headaches, No numbness/tingling b/l LE, No weakness    PHYSICAL EXAM:  GENERAL:  Alert & Oriented X4, cooperative, NAD, Good concentration. Speech is clear.   RESPIRATORY: Respirations even and unlabored. Clear to auscultation bilaterally; No rales, rhonchi, wheezing, or rubs  CARDIOVASCULAR: Normal S1/S2, regular rate and rhythm; No murmurs, rubs, or gallops. No JVD.   GASTROINTESTINAL:  Soft, Nontender, Nondistended; Bowel sounds present. NPO order in place.  PERIPHERAL VASCULAR:  Extremities warm with left hip edema. 2+ Peripheral Pulses, No cyanosis, No calf tenderness  MUSCULOSKELETAL: Motor Strength 5/5 B/L upper and 2/5 lower extremities; ROM decreased to LLE due to pain; + tenderness on palpation of left hip.  SKIN: Warm, dry, intact. No rashes, lesions, scars or wounds. + left hip surgical dressing c/d/i     Risk factors associated with adverse outcomes related to opioid treatment  [ ]  Concurrent benzodiazepine use  [ ]  History/ Active substance use or alcohol use disorder  [ ] Psychiatric co-morbidity  [ ] Sleep apnea  [ ] COPD  [ ] BMI> 35  [ ] Liver dysfunction  [ ] Renal dysfunction  [ ] CHF  [ ] Smoker  [x]  Age > 60 years    [x]  [place]  Reviewed and Copied to Chart. See below.    Plan of care and goal oriented pain management treatment options were discussed with patient and /or primary care giver; all questions and concerns were addressed and care was aligned with patient's wishes.    Educated patient on goal oriented pain management treatment options     11-22-23 @ 12:19 Source of information: CAROL ROY, Chart review  Patient language: English  : n/a    HPI:  Patient is a 79 y/o F with who ambulates independently with  PMH of HTN and carotid stenosis and PSH of appendectomy presented after a fall.   Patient reports that after her usual run in the park she was walking at a slower pace to cool down when she tripped and fell on her left side. Patient reports she did not have any lightheadedness, or dizziness before the fall. Patient also denies hitting her head or losing consciousness. Patient reports that after the fall she had sharp pain in her left hip and left chest. Patient reports after the fall some passerby tried to help him get up but she was not able to stand back up.   Patient denies any recent fevers, chills, weakness, fatigue, malaise, headache, dizziness, lightheadedness, chest pain, palpitations, shortness of breath, cough, nausea, vomiting, abdominal pain, diarrhea, constipation, melena, hematochezia, dysuria, urinary frequency, or urgency. Patient denies any other complains at this time.   Patient reports taking multiple vitamins/supplements like vitamin D, calcium, magnesium, chondroitin sulphate, and glucosamine. Patient denies history of osteoporosis.     [attending addendum: discussed with pt's PMD / cardiologist, Dr Burton, patient with history of a carotid plaque Aortic insufficiency ( moderate) and possible CVA/TIA ( in [place])) but has not had any recent visit or cardiovascular workup.. >> Echo and carotic ordered)]      (18 Nov 2023 14:16)      Pt is admitted for s/p Fall. [facility]. Found to have left Femoral neck fracture. S/p percutaneous pinning of left hip on 11/20 POD #1.  Pain consulted for left hip pain.  Pt seen and examined at bedside. Reports pain score 5/10 on left hip and tolerable. SCALE USED: (1-10 VNRS). Pt describes pain as aching and radiating to LLE, alleviated by pain medication and exacerbated by movement. Pt tolerating PO diet. Denies lethargy, nausea, vomiting, constipation, itchiness. Reports last BM 11/18. Patient stated goal for pain control: to be able to take deep breaths, get out of bed to chair and ambulate with tolerable pain control. Reports getting out of bed to chair 11/21. Has not yet seen PT this morning at time of evaluation. Pt reports taking Advil for pain at home.     PAST MEDICAL & SURGICAL HISTORY:      FAMILY HISTORY:    Social History:  Patient denies smoking, alcohol use, or illicit drug use. (18 Nov 2023 14:16)   [x] Denies ETOH use, illicit drug use and smoking    Allergies    No Known Allergies    MEDICATIONS  (STANDING):  acetaminophen     Tablet .. 1000 milliGRAM(s) Oral every 8 hours  enoxaparin Injectable 30 milliGRAM(s) SubCutaneous every 12 hours  polyethylene glycol 3350 17 Gram(s) Oral daily  povidone iodine 10% Solution 1 Application(s) Topical once  senna 2 Tablet(s) Oral at bedtime    MEDICATIONS  (PRN):  magnesium hydroxide Suspension 30 milliLiter(s) Oral daily PRN Constipation  melatonin 3 milliGRAM(s) Oral at bedtime PRN Insomnia  ondansetron Injectable 4 milliGRAM(s) IV Push every 8 hours PRN Nausea and/or Vomiting  oxyCODONE    IR 5 milliGRAM(s) Oral every 4 hours PRN Severe Pain (7 - 10)  oxyCODONE    IR 2.5 milliGRAM(s) Oral every 4 hours PRN Moderate Pain (4 - 6)      Vital Signs Last 24 Hrs  T(C): 36.4 (22 Nov 2023 05:54), Max: 36.8 (21 Nov 2023 14:26)  T(F): 97.5 (22 Nov 2023 05:54), Max: 98.3 (21 Nov 2023 19:49)  HR: 58 (22 Nov 2023 05:54) (58 - 83)  BP: 143/74 (22 Nov 2023 05:54) (87/46 - 144/79)  BP(mean): 97 (22 Nov 2023 05:54) (60 - 101)  RR: 16 (22 Nov 2023 05:54) (16 - 20)  SpO2: 97% (22 Nov 2023 05:54) (94% - 97%)    Parameters below as of 22 Nov 2023 05:54  Patient On (Oxygen Delivery Method): room air        LABS: Reviewed                          12.9   7.01  )-----------( 195      ( 22 Nov 2023 04:38 )             38.4     11-22    136  |  103  |  13  ----------------------------<  118<H>  4.0   |  30  |  0.62    Ca    8.3<L>      22 Nov 2023 04:38          Urinalysis Basic - ( 22 Nov 2023 04:38 )    Color: x / Appearance: x / SG: x / pH: x  Gluc: 118 mg/dL / Ketone: x  / Bili: x / Urobili: x   Blood: x / Protein: x / Nitrite: x   Leuk Esterase: x / RBC: x / WBC x   Sq Epi: x / Non Sq Epi: x / Bacteria: x    Radiology: Reviewed.     ACC: [id number] EXAM:  CT HIP ONLY LT   ORDERED BY: FRITZ TORRES     PROCEDURE DATE:  11/20/2023          INTERPRETATION:  CLINICAL INDICATION: Fall, assess left hip fracture.    TECHNIQUE: CT axial images of the left hip were obtained without   intravenous contrast. Coronal and sagittal reformatted images were also   obtained.    CONTRAST/COMPLICATIONS:  IV Contrast: NONE  Complications: None reported at time of study completion    COMPARISON: XR: 11/18/2023. CT: None. MR: None.    FINDINGS: Evaluation of soft tissue is limited without intravenous   contrast.    Bones: Decreased bone mineralization. Acute impacted left femoral neck   fracture with superomedial displacement. No dislocation    Degenerative changes of the visualized left sacroiliac joint, pubic   symphysis and left hip.    Soft tissue: Stranding at the left lateral hip superficial soft tissue,   likely edema/ecchymosis. Stranding at the deep left hip/proximal thigh   soft tissue. Diffuse muscle bulk loss with fatty replacement. Left hip   hemarthrosis. Chondrocalcinosis at the left hip and pubic symphysis.    Additional: Colon diverticulosis. Atherosclerosis.    IMPRESSION:    Acute impacted left femoral neck fracture.    --- End of Report ---    DAVE TENORIO MD; Attending Radiologist  This document has been electronically signed. Nov 20 2023  1:27AM  ACC: [id number] EXAM:  CT CHEST   ORDERED BY: MERA TIPTON     PROCEDURE DATE:  11/18/2023      INTERPRETATION:  HISTORY: Chest wall pain after fall.    EXAMINATION: CT CHEST was performed without IV contrast.    COMPARISON: None available.    FINDINGS:    AIRWAYS, LUNGS, PLEURA: Clear central airways. No consolidation.   Nonspecific right apical 0.6 cm ground-glass nodule with focal 0.3 cm   solid component (image 69, series 2). No pleural effusion.    MEDIASTINUM: Cardiomegaly. No pericardial effusion. Thoracic aorta normal   caliber.  No large mediastinal lymph nodes.    IMAGED ABDOMEN: Right hepatic lobe hypodensities, likely cysts.    SOFT TISSUES: Unremarkable.    BONES: No acute osseous abnormality.    IMPRESSION:.    No evidence of acute thoracic traumatic injury on this noncontrast CT   chest.    Nonspecific right apical 0.6 upper ground-glass nodule with focal 0.3 cm   solid component. Recommend CT chest follow-up in 3 months to assess   stability.    --- End of Report ---     DEJON RAMÍREZ MD; Attending Radiologist  This document has been electronically signed. Nov 18 2023 11:20AM    ORT Score -   Family Hx of substance abuse	Female	      Male  Alcohol 	                                           1                     3  Illegal drugs	                                   2                     3  Rx drugs                                           4 	                  4  Personal Hx of substance abuse		  Alcohol 	                                          3	                  3  Illegal drugs                                     4	                  4  Rx drugs                                            5 	                  5  Age between 16- 45 years	           1                     1  hx preadolescent sexual abuse	   3 	                  0  Psychological disease		  ADD, OCD, bipolar, schizophrenia   2	          2  Depression                                           1 	          1  Total: 0    a score of 3 or lower indicates low risk for opioid abuse		  a score of 4-7 indicates moderate risk for opioid abuse		  a score of 8 or higher indicates high risk for opioid abuse  	  REVIEW OF SYSTEMS:  CONSTITUTIONAL: No fever or fatigue  RESPIRATORY: No cough, wheezing, chills or hemoptysis; No shortness of breath  CARDIOVASCULAR: No chest pain, palpitations, dizziness, or leg swelling  GASTROINTESTINAL: No loss of appetite, decreased PO intake. No abdominal or epigastric pain. No nausea, vomiting; No diarrhea or constipation.   GENITOURINARY: No dysuria, frequency, hematuria, retention or incontinence  MUSCULOSKELETAL: + Left hip pain and swelling; No muscle, back, pain, no upper muscle strength weakness, no saddle anesthesia, bowel/bladder incontinence, + falls.   NEURO: No headaches, No numbness/tingling b/l LE, No weakness    PHYSICAL EXAM:  GENERAL:  Alert & Oriented X4, cooperative, NAD, Good concentration. Speech is clear.   RESPIRATORY: Respirations even and unlabored. Clear to auscultation bilaterally; No rales, rhonchi, wheezing, or rubs  CARDIOVASCULAR: Normal S1/S2, regular rate and rhythm; No murmurs, rubs, or gallops. No JVD.   GASTROINTESTINAL:  Soft, Nontender, Nondistended; Bowel sounds present. NPO order in place.  PERIPHERAL VASCULAR:  Extremities warm with left hip edema. 2+ Peripheral Pulses, No cyanosis, No calf tenderness  MUSCULOSKELETAL: Motor Strength 5/5 B/L upper and 2/5 lower extremities; ROM decreased to LLE due to pain; + tenderness on palpation of left hip.  SKIN: Warm, dry, intact. No rashes, lesions, scars or wounds. + left hip surgical dressing c/d/i     Risk factors associated with adverse outcomes related to opioid treatment  [ ]  Concurrent benzodiazepine use  [ ]  History/ Active substance use or alcohol use disorder  [ ] Psychiatric co-morbidity  [ ] Sleep apnea  [ ] COPD  [ ] BMI> 35  [ ] Liver dysfunction  [ ] Renal dysfunction  [ ] CHF  [ ] Smoker  [x]  Age > 60 years    [x]  [place]  Reviewed and Copied to Chart. See below.    Plan of care and goal oriented pain management treatment options were discussed with patient and /or primary care giver; all questions and concerns were addressed and care was aligned with patient's wishes.    Educated patient on goal oriented pain management treatment options     11-22-23 @ 12:19 Source of information: CAROL ROY, Chart review  Patient language: English  : n/a    HPI:  Patient is a 77 y/o F with who ambulates independently with  PMH of HTN and carotid stenosis and PSH of appendectomy presented after a fall.   Patient reports that after her usual run in the park she was walking at a slower pace to cool down when she tripped and fell on her left side. Patient reports she did not have any lightheadedness, or dizziness before the fall. Patient also denies hitting her head or losing consciousness. Patient reports that after the fall she had sharp pain in her left hip and left chest. Patient reports after the fall some passerby tried to help him get up but she was not able to stand back up.   Patient denies any recent fevers, chills, weakness, fatigue, malaise, headache, dizziness, lightheadedness, chest pain, palpitations, shortness of breath, cough, nausea, vomiting, abdominal pain, diarrhea, constipation, melena, hematochezia, dysuria, urinary frequency, or urgency. Patient denies any other complains at this time.   Patient reports taking multiple vitamins/supplements like vitamin D, calcium, magnesium, chondroitin sulphate, and glucosamine. Patient denies history of osteoporosis.     [attending addendum: discussed with pt's PMD / cardiologist, Dr Burton, patient with history of a carotid plaque Aortic insufficiency ( moderate) and possible CVA/TIA ( in [place])) but has not had any recent visit or cardiovascular workup.. >> Echo and carotic ordered)]      (18 Nov 2023 14:16)      Pt is admitted for s/p Fall. [facility]. Found to have left Femoral neck fracture. S/p percutaneous pinning of left hip on 11/20 POD #1.  Pain consulted for left hip pain.  Pt seen and examined at bedside. Reports pain score 5/10 on left hip and tolerable. SCALE USED: (1-10 VNRS). Pt describes pain as aching and radiating to LLE, alleviated by pain medication and exacerbated by movement. Pt tolerating PO diet. Denies lethargy, nausea, vomiting, constipation, itchiness. Reports last BM 11/18. Patient stated goal for pain control: to be able to take deep breaths, get out of bed to chair and ambulate with tolerable pain control. Reports getting out of bed to chair 11/21. Has not yet seen PT this morning at time of evaluation. Pt reports taking Advil for pain at home.     PAST MEDICAL & SURGICAL HISTORY:      FAMILY HISTORY:    Social History:  Patient denies smoking, alcohol use, or illicit drug use. (18 Nov 2023 14:16)   [x] Denies ETOH use, illicit drug use and smoking    Allergies    No Known Allergies    MEDICATIONS  (STANDING):  acetaminophen     Tablet .. 1000 milliGRAM(s) Oral every 8 hours  enoxaparin Injectable 30 milliGRAM(s) SubCutaneous every 12 hours  polyethylene glycol 3350 17 Gram(s) Oral daily  povidone iodine 10% Solution 1 Application(s) Topical once  senna 2 Tablet(s) Oral at bedtime    MEDICATIONS  (PRN):  magnesium hydroxide Suspension 30 milliLiter(s) Oral daily PRN Constipation  melatonin 3 milliGRAM(s) Oral at bedtime PRN Insomnia  ondansetron Injectable 4 milliGRAM(s) IV Push every 8 hours PRN Nausea and/or Vomiting  oxyCODONE    IR 5 milliGRAM(s) Oral every 4 hours PRN Severe Pain (7 - 10)  oxyCODONE    IR 2.5 milliGRAM(s) Oral every 4 hours PRN Moderate Pain (4 - 6)      Vital Signs Last 24 Hrs  T(C): 36.4 (22 Nov 2023 05:54), Max: 36.8 (21 Nov 2023 14:26)  T(F): 97.5 (22 Nov 2023 05:54), Max: 98.3 (21 Nov 2023 19:49)  HR: 58 (22 Nov 2023 05:54) (58 - 83)  BP: 143/74 (22 Nov 2023 05:54) (87/46 - 144/79)  BP(mean): 97 (22 Nov 2023 05:54) (60 - 101)  RR: 16 (22 Nov 2023 05:54) (16 - 20)  SpO2: 97% (22 Nov 2023 05:54) (94% - 97%)    Parameters below as of 22 Nov 2023 05:54  Patient On (Oxygen Delivery Method): room air        LABS: Reviewed                          12.9   7.01  )-----------( 195      ( 22 Nov 2023 04:38 )             38.4     11-22    136  |  103  |  13  ----------------------------<  118<H>  4.0   |  30  |  0.62    Ca    8.3<L>      22 Nov 2023 04:38          Urinalysis Basic - ( 22 Nov 2023 04:38 )    Color: x / Appearance: x / SG: x / pH: x  Gluc: 118 mg/dL / Ketone: x  / Bili: x / Urobili: x   Blood: x / Protein: x / Nitrite: x   Leuk Esterase: x / RBC: x / WBC x   Sq Epi: x / Non Sq Epi: x / Bacteria: x    Radiology: Reviewed.     ACC: [id number] EXAM:  CT HIP ONLY LT   ORDERED BY: FRITZ TORRES     PROCEDURE DATE:  11/20/2023          INTERPRETATION:  CLINICAL INDICATION: Fall, assess left hip fracture.    TECHNIQUE: CT axial images of the left hip were obtained without   intravenous contrast. Coronal and sagittal reformatted images were also   obtained.    CONTRAST/COMPLICATIONS:  IV Contrast: NONE  Complications: None reported at time of study completion    COMPARISON: XR: 11/18/2023. CT: None. MR: None.    FINDINGS: Evaluation of soft tissue is limited without intravenous   contrast.    Bones: Decreased bone mineralization. Acute impacted left femoral neck   fracture with superomedial displacement. No dislocation    Degenerative changes of the visualized left sacroiliac joint, pubic   symphysis and left hip.    Soft tissue: Stranding at the left lateral hip superficial soft tissue,   likely edema/ecchymosis. Stranding at the deep left hip/proximal thigh   soft tissue. Diffuse muscle bulk loss with fatty replacement. Left hip   hemarthrosis. Chondrocalcinosis at the left hip and pubic symphysis.    Additional: Colon diverticulosis. Atherosclerosis.    IMPRESSION:    Acute impacted left femoral neck fracture.    --- End of Report ---    DAVE TENORIO MD; Attending Radiologist  This document has been electronically signed. Nov 20 2023  1:27AM  ACC: [id number] EXAM:  CT CHEST   ORDERED BY: MERA TIPTON     PROCEDURE DATE:  11/18/2023      INTERPRETATION:  HISTORY: Chest wall pain after fall.    EXAMINATION: CT CHEST was performed without IV contrast.    COMPARISON: None available.    FINDINGS:    AIRWAYS, LUNGS, PLEURA: Clear central airways. No consolidation.   Nonspecific right apical 0.6 cm ground-glass nodule with focal 0.3 cm   solid component (image 69, series 2). No pleural effusion.    MEDIASTINUM: Cardiomegaly. No pericardial effusion. Thoracic aorta normal   caliber.  No large mediastinal lymph nodes.    IMAGED ABDOMEN: Right hepatic lobe hypodensities, likely cysts.    SOFT TISSUES: Unremarkable.    BONES: No acute osseous abnormality.    IMPRESSION:.    No evidence of acute thoracic traumatic injury on this noncontrast CT   chest.    Nonspecific right apical 0.6 upper ground-glass nodule with focal 0.3 cm   solid component. Recommend CT chest follow-up in 3 months to assess   stability.    --- End of Report ---     DEJON RAMÍREZ MD; Attending Radiologist  This document has been electronically signed. Nov 18 2023 11:20AM    ORT Score -   Family Hx of substance abuse	Female	      Male  Alcohol 	                                           1                     3  Illegal drugs	                                   2                     3  Rx drugs                                           4 	                  4  Personal Hx of substance abuse		  Alcohol 	                                          3	                  3  Illegal drugs                                     4	                  4  Rx drugs                                            5 	                  5  Age between 16- 45 years	           1                     1  hx preadolescent sexual abuse	   3 	                  0  Psychological disease		  ADD, OCD, bipolar, schizophrenia   2	          2  Depression                                           1 	          1  Total: 0    a score of 3 or lower indicates low risk for opioid abuse		  a score of 4-7 indicates moderate risk for opioid abuse		  a score of 8 or higher indicates high risk for opioid abuse  	  REVIEW OF SYSTEMS:  CONSTITUTIONAL: No fever or fatigue  RESPIRATORY: No cough, wheezing, chills or hemoptysis; No shortness of breath  CARDIOVASCULAR: No chest pain, palpitations, dizziness, or leg swelling  GASTROINTESTINAL: No loss of appetite, decreased PO intake. No abdominal or epigastric pain. No nausea, vomiting; No diarrhea or constipation.   GENITOURINARY: No dysuria, frequency, hematuria, retention or incontinence  MUSCULOSKELETAL: + Left hip pain and swelling; No muscle, back, pain, no upper muscle strength weakness, no saddle anesthesia, bowel/bladder incontinence, + falls.   NEURO: No headaches, No numbness/tingling b/l LE, No weakness    PHYSICAL EXAM:  GENERAL:  Alert & Oriented X4, cooperative, NAD, Good concentration. Speech is clear.   RESPIRATORY: Respirations even and unlabored. Clear to auscultation bilaterally; No rales, rhonchi, wheezing, or rubs  CARDIOVASCULAR: Normal S1/S2, regular rate and rhythm; No murmurs, rubs, or gallops. No JVD.   GASTROINTESTINAL:  Soft, Nontender, Nondistended; Bowel sounds present. NPO order in place.  PERIPHERAL VASCULAR:  Extremities warm with left hip edema. 2+ Peripheral Pulses, No cyanosis, No calf tenderness  MUSCULOSKELETAL: Motor Strength 5/5 B/L upper and 2/5 lower extremities; ROM decreased to LLE due to pain; + tenderness on palpation of left hip.  SKIN: Warm, dry, intact. No rashes, lesions, scars or wounds. + left hip surgical dressing c/d/i     Risk factors associated with adverse outcomes related to opioid treatment  [ ]  Concurrent benzodiazepine use  [ ]  History/ Active substance use or alcohol use disorder  [ ] Psychiatric co-morbidity  [ ] Sleep apnea  [ ] COPD  [ ] BMI> 35  [ ] Liver dysfunction  [ ] Renal dysfunction  [ ] CHF  [ ] Smoker  [x]  Age > 60 years    [x]  [place]  Reviewed and Copied to Chart. See below.    Plan of care and goal oriented pain management treatment options were discussed with patient and /or primary care giver; all questions and concerns were addressed and care was aligned with patient's wishes.    Educated patient on goal oriented pain management treatment options     11-22-23 @ 12:19

## 2023-11-22 NOTE — PROGRESS NOTE ADULT - ASSESSMENT
M E D I C A L   A T T E N D I N G    F O L L O W    U P   N O T E  (11-22-23 )                                     ------------------------------------------------------------------------------------------------    patient evaluated by me, case discussed with team, chart, medications, and physical exam reviewed, labs / tests  and vitals reviewed by me, as bellow.   Patient is stable for discharge today. patient overall doing well post op, pain controlled...  patient opting to go home, rather than rehabilitation... CM following appreciated   Patient to follow up with  PMD, Dr Burton   See discharge document for full note.  discussed with ACP, patient, , PMD>.   [Greater than 35 min spent for these services. ]          ( Note written / Date of service 11-22-23 ( This is certified to be the same as "ENTERED" date above ( for billing purposes)))    ==================>> MEDICATIONS <<====================    acetaminophen     Tablet .. 1000 milliGRAM(s) Oral every 8 hours  enoxaparin Injectable 30 milliGRAM(s) SubCutaneous every 12 hours  polyethylene glycol 3350 17 Gram(s) Oral daily  povidone iodine 10% Solution 1 Application(s) Topical once  senna 2 Tablet(s) Oral at bedtime    MEDICATIONS  (PRN):  magnesium hydroxide Suspension 30 milliLiter(s) Oral daily PRN Constipation  melatonin 3 milliGRAM(s) Oral at bedtime PRN Insomnia  ondansetron Injectable 4 milliGRAM(s) IV Push every 8 hours PRN Nausea and/or Vomiting  oxyCODONE    IR 5 milliGRAM(s) Oral every 4 hours PRN Severe Pain (7 - 10)  oxyCODONE    IR 2.5 milliGRAM(s) Oral every 4 hours PRN Moderate Pain (4 - 6)    ___________  Active diet:  Diet, Regular:   Lacto-Ovo Veg (Accepts Milk Prod., Eggs)  ___________________    ==================>> VITAL SIGNS <<==================    T(C): 37.6 (11-22-23 @ 13:05), Max: 37.6 (11-22-23 @ 13:05)  HR: 66 (11-22-23 @ 13:05) (58 - 68)  BP: 128/77 (11-22-23 @ 13:05) (104/59 - 144/79)  BP(mean): 97 (11-22-23 @ 05:54)  RR: 16 (11-22-23 @ 13:05) (16 - 18)  SpO2: 96% (11-22-23 @ 13:05) (94% - 97%)       I&O's Summary    21 Nov 2023 07:01  -  22 Nov 2023 07:00  --------------------------------------------------------  IN: 0 mL / OUT: 800 mL / NET: -800 mL    22 Nov 2023 07:01  -  22 Nov 2023 13:12  --------------------------------------------------------  IN: 0 mL / OUT: 1200 mL / NET: -1200 mL       ==================>> LAB AND IMAGING <<==================                        12.9   7.01  )-----------( 195      ( 22 Nov 2023 04:38 )             38.4        11-22    136  |  103  |  13  ----------------------------<  118<H>  4.0   |  30  |  0.62    Ca    8.3<L>      22 Nov 2023 04:38    WBC count:   7.01 <<== ,  9.31 <<== ,  7.27 <<== ,  7.59 <<== ,  7.15 <<==   Hemoglobin:   12.9 <<==,  13.4 <<==,  13.4 <<==,  13.5 <<==,  13.8 <<==  platelets:  195 <==, 170 <==, 180 <==, 205 <==, 218 <==    Creatinine:  0.62  <<==, 0.63  <<==, 0.72  <<==, 0.68  <<==, 0.78  <<==  Sodium:   136  <==, 137  <==, 135  <==, 139  <==, 138  <==        ( Note written / Date of service 11-22-23 ( This is certified to be the same as "ENTERED" date above ( for billing Purposes )))

## 2023-11-22 NOTE — DISCHARGE NOTE NURSING/CASE MANAGEMENT/SOCIAL WORK - NSDCPEFALRISK_GEN_ALL_CORE
For information on Fall & Injury Prevention, visit: [email or web address]://[email or web address]-[email or web address]-[email or web address]-and-mobility OR  [email or web address]://www.[email or web address]/news/fall-prevention-tips-to-avoid-injury OR  https://www.cdc.gov/steadi/patient.html For information on Fall & Injury Prevention, visit: https://www.[facility]/news/fall-prevention-protects-and-maintains-health-and-mobility OR  https://www.[facility]/news/fall-prevention-tips-to-avoid-injury OR  https://www.cdc.gov/steadi/patient.html For information on Fall & Injury Prevention, visit: https://www.[facility]/news/fall-prevention-protects-and-maintains-health-and-mobility OR  https://www.[facility]/news/fall-prevention-tips-to-avoid-injury OR  https://www.cdc.gov/steadi/patient.html

## 2023-11-22 NOTE — DISCHARGE NOTE NURSING/CASE MANAGEMENT/SOCIAL WORK - PATIENT PORTAL LINK FT
You can access the FollowMyHealth Patient Portal offered by [facility] by registering at the following website: [email or web address]. By joining [employer]’s FollowMyHealth portal, you will also be able to view your health information using other applications (apps) compatible with our system. You can access the FollowMyHealth Patient Portal offered by [facility] by registering at the following website: [email or web address]. By joining [employer]’s FollowMyHealth portal, you will also be able to view your health information using other applications (apps) compatible with our system. You can access the FollowMyHealth Patient Portal offered by [facility] by registering at the following website: [email or web address]. By joining [email or web address]’s FollowMyHealth portal, you will also be able to view your health information using other applications (apps) compatible with our system.

## 2023-11-22 NOTE — PROGRESS NOTE ADULT - SUBJECTIVE AND OBJECTIVE BOX
78yFemale    Diagnosis:  S/p  Left hip pinning fx POD#2    24hr interval/acute events:  Pt offers no acute complaints.   Pain is moderate; localized to the LLE and well controlled with medication. Pain is increased with movement and alleviated when laying in bed.   Denies cp, sob, palpitations, paresthesias, nvd.    Vital Signs Last 24 Hrs  T(C): 36.4 (22 Nov 2023 05:54), Max: 36.8 (21 Nov 2023 14:26)  T(F): 97.5 (22 Nov 2023 05:54), Max: 98.3 (21 Nov 2023 19:49)  HR: 58 (22 Nov 2023 05:54) (58 - 83)  BP: 143/74 (22 Nov 2023 05:54) (87/46 - 144/79)  BP(mean): 97 (22 Nov 2023 05:54) (60 - 101)  RR: 16 (22 Nov 2023 05:54) (16 - 20)  SpO2: 97% (22 Nov 2023 05:54) (94% - 97%)    Parameters below as of 22 Nov 2023 05:54  Patient On (Oxygen Delivery Method): room air      I&O's Summary    21 Nov 2023 07:01  -  22 Nov 2023 07:00  --------------------------------------------------------  IN: 0 mL / OUT: 800 mL / NET: -800 mL      Physical Exam:    General: AAOx3, in NAD, resting comfortably in bed.  Left hip:    Skin pink, warm LLE.  In nl. alignment.   Dressing is C/D/I.    No ct, calves soft  2+pulses  NVI silt  5/5 strength ehl/ta/gs b/l.                                         12.9   7.01  )-----------( 195      ( 22 Nov 2023 04:38 )             38.4       11-22    136  |  103  |  13  ----------------------------<  118<H>  4.0   |  30  |  0.62    Ca    8.3<L>      22 Nov 2023 04:38          Impression:  78yFemale S/p Left hip pinning fx POD#2  Plan:  -  Continue pain management and home meds  -  DVT prophylaxis with Lovenox  -  Daily Physical Therapy:  WBAT on LLE with appropriate assistive device  -  Discharge planning: Rehab pending. Patient prefers to go home.   -  Continue with care plan as per medicine team  -  Incentive Spirometry  -  Case d/w Dr. Anaya An

## 2023-11-22 NOTE — CHART NOTE - NSCHARTNOTEFT_GEN_A_CORE
77 y/o Female with PMHx of HTN and carotid stenosis and PSH of appendectomy s/p Left Hip pinning. Patient was seen and evaluated at bedside today. Pain is localized to the LLE and well controlled with medication.     Patient will require a commode for home as patient is currently confined to a single room and is unable to navigate to the location where the current toileting facilities in the home are located as well as due to the height restriction of the existing toilet setup.    The patient will use a walker in the home and outside daily. A walker will provide greater stability and safe ambulation to complete MRADLs. 79 y/o Female with PMHx of HTN and carotid stenosis and PSH of appendectomy s/p Left Hip pinning. Patient was seen and evaluated at bedside today. Pain is localized to the LLE and well controlled with medication.     Patient will require a commode for home as patient is currently confined to a single room and is unable to navigate to the location where the current toileting facilities in the home are located as well as due to the height restriction of the existing toilet setup.    The patient will use a walker in the home and outside daily. A walker will provide greater stability and safe ambulation to complete MRADLs.

## 2024-01-01 NOTE — PROGRESS NOTE ADULT - ASSESSMENT
3.88 Confidential Drug Utilization Report  Search Terms: Rocio Crow, 1945Search Date: 11/20/2023 13:08:45 PM  The Drug Utilization Report below displays all of the controlled substance prescriptions, if any, that your patient has filled in the last twelve months. The information displayed on this report is compiled from pharmacy submissions to the Department, and accurately reflects the information as submitted by the pharmacies.    This report was requested by: Deanna Canada | Reference #: [id number]    There are no results for the search terms that you entered.      Confidential Drug Utilization Report  Search Terms: Rocio Crow, 1945Search Date: 11/20/2023 13:08:45 PM  The Drug Utilization Report below displays all of the controlled substance prescriptions, if any, that your patient has filled in the last twelve months. The information displayed on this report is compiled from pharmacy submissions to the Department, and accurately reflects the information as submitted by the pharmacies.    This report was requested by: Deanna Canada | Reference #: [id number]    There are no results for the search terms that you entered.      Confidential Drug Utilization Report  Search Terms: Rocio Crow, 1945Search Date: 11/20/2023 13:08:45 PM  The Drug Utilization Report below displays all of the controlled substance prescriptions, if any, that your patient has filled in the last twelve months. The information displayed on this report is compiled from pharmacy submissions to the Department, and accurately reflects the information as submitted by the pharmacies.    This report was requested by: Deanna Canada | Reference #: [id number]    There are no results for the search terms that you entered.

## 2024-01-09 RX ORDER — GLUCOSAMINE HCL/CHONDROITIN SU 500-400 MG
1 CAPSULE ORAL
Refills: 0 | DISCHARGE

## 2024-01-09 RX ORDER — CALCIUM CARBONATE 500(1250)
1 TABLET ORAL
Refills: 0 | DISCHARGE

## 2024-01-09 RX ORDER — ASPIRIN/CALCIUM CARB/MAGNESIUM 324 MG
1 TABLET ORAL
Refills: 0 | DISCHARGE

## 2024-07-22 ENCOUNTER — APPOINTMENT (OUTPATIENT)
Dept: OTOLARYNGOLOGY | Facility: CLINIC | Age: 79
End: 2024-07-22
Payer: MEDICARE

## 2024-07-22 VITALS
SYSTOLIC BLOOD PRESSURE: 134 MMHG | HEIGHT: 64 IN | HEART RATE: 49 BPM | OXYGEN SATURATION: 98 % | DIASTOLIC BLOOD PRESSURE: 68 MMHG | WEIGHT: 105 LBS | BODY MASS INDEX: 17.93 KG/M2

## 2024-07-22 DIAGNOSIS — R42 DIZZINESS AND GIDDINESS: ICD-10-CM

## 2024-07-22 DIAGNOSIS — H90.3 SENSORINEURAL HEARING LOSS, BILATERAL: ICD-10-CM

## 2024-07-22 PROCEDURE — 92567 TYMPANOMETRY: CPT

## 2024-07-22 PROCEDURE — 92557 COMPREHENSIVE HEARING TEST: CPT

## 2024-07-22 PROCEDURE — 99203 OFFICE O/P NEW LOW 30 MIN: CPT

## 2024-07-22 NOTE — REASON FOR VISIT
[Subsequent Evaluation] : a subsequent evaluation for [FreeTextEntry2] : dizziness and hearing loss.

## 2024-07-22 NOTE — CONSULT LETTER
[Dear  ___] : Dear  [unfilled], [Consult Letter:] : I had the pleasure of evaluating your patient, [unfilled]. [Please see my note below.] : Please see my note below. [Consult Closing:] : Thank you very much for allowing me to participate in the care of this patient.  If you have any questions, please do not hesitate to contact me. [Sincerely,] : Sincerely, [FreeTextEntry3] : Breann Moreno MD Otolaryngology and Cranial Base Surgery Attending Physician - Department of Otolaryngology and Head & Neck Surgery [facility]  [employer] [facility]

## 2024-07-22 NOTE — HISTORY OF PRESENT ILLNESS
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 She notes initially she had some spinning sensations when rolling in bed however this has resolved and now she has only a sensation of light-headedness at times. She notes her hearing seems to have worsened and she would like it checked. No other ear issues.

## (undated) DEVICE — WARMING BLANKET UPPER ADULT

## (undated) DEVICE — DRSG DERMABOND 0.7ML

## (undated) DEVICE — DRSG CURITY GAUZE SPONGE 4 X 4" 12-PLY

## (undated) DEVICE — GLV 8.5 PROTEXIS (BLUE)

## (undated) DEVICE — DRSG COMBINE 5X9"

## (undated) DEVICE — PREP BETADINE SPONGE STICKS

## (undated) DEVICE — SUT POLYSORB 1 18" UNDYED

## (undated) DEVICE — SUT POLYSORB 2-0 30" GS-10 UNDYED

## (undated) DEVICE — DRSG MEDIPORE DRESS IT 7-7/8 X 11"

## (undated) DEVICE — DRAPE IOBAN 13" X 13"

## (undated) DEVICE — SUT VICRYL PLUS 1 27" CT-1 UNDYED

## (undated) DEVICE — GLV 8 PROTEXIS (CREAM) MICRO

## (undated) DEVICE — DRAPE SHOWER CURTAIN ISOLATION

## (undated) DEVICE — DRAPE LEGGINGS 6" CUFF 28X43"

## (undated) DEVICE — WARMING BLANKET FULL ADULT

## (undated) DEVICE — PACK MINOR NO DRAPE

## (undated) DEVICE — VENODYNE/SCD SLEEVE CALF MEDIUM

## (undated) DEVICE — STAPLER SKIN VISI-STAT 35 WIDE

## (undated) DEVICE — TAPE SILK 3"

## (undated) DEVICE — SUT VICRYL PLUS 2-0 27" SH UNDYED

## (undated) DEVICE — POSITIONER FOAM MATTRESS PAD CONVOLUTED (PINK)

## (undated) DEVICE — SOL IRR POUR NS 0.9% 1500ML

## (undated) DEVICE — DRSG ADAPTIC 3 X 8"

## (undated) DEVICE — DRILL BIT STRYKER ORTHO TRAUMA 4.9MM

## (undated) DEVICE — DRSG COBAN 6"